# Patient Record
Sex: MALE | Race: WHITE | NOT HISPANIC OR LATINO | Employment: OTHER | URBAN - METROPOLITAN AREA
[De-identification: names, ages, dates, MRNs, and addresses within clinical notes are randomized per-mention and may not be internally consistent; named-entity substitution may affect disease eponyms.]

---

## 2017-06-20 ENCOUNTER — TRANSCRIBE ORDERS (OUTPATIENT)
Dept: ADMINISTRATIVE | Facility: HOSPITAL | Age: 47
End: 2017-06-20

## 2017-06-20 DIAGNOSIS — F43.22 ADJUSTMENT DISORDER WITH ANXIETY: Primary | ICD-10-CM

## 2017-07-01 ENCOUNTER — HOSPITAL ENCOUNTER (OUTPATIENT)
Dept: SLEEP CENTER | Facility: CLINIC | Age: 47
Discharge: HOME | End: 2017-07-01
Payer: OTHER MISCELLANEOUS

## 2017-07-01 DIAGNOSIS — F43.22 ADJUSTMENT DISORDER WITH ANXIETY: ICD-10-CM

## 2017-07-01 PROCEDURE — 95810 POLYSOM 6/> YRS 4/> PARAM: CPT

## 2017-08-05 ENCOUNTER — HOSPITAL ENCOUNTER (OUTPATIENT)
Dept: SLEEP CENTER | Facility: CLINIC | Age: 47
Discharge: HOME | End: 2017-08-05

## 2017-11-30 ENCOUNTER — HOSPITAL ENCOUNTER (OUTPATIENT)
Dept: RADIOLOGY | Facility: HOSPITAL | Age: 47
Discharge: HOME | End: 2017-11-30
Attending: INTERNAL MEDICINE
Payer: OTHER MISCELLANEOUS

## 2017-11-30 ENCOUNTER — GENERIC CONVERSION - ENCOUNTER (OUTPATIENT)
Dept: OTHER | Facility: OTHER | Age: 47
End: 2017-11-30

## 2017-11-30 ENCOUNTER — TRANSCRIBE ORDERS (OUTPATIENT)
Dept: ADMINISTRATIVE | Facility: HOSPITAL | Age: 47
End: 2017-11-30

## 2017-11-30 ENCOUNTER — APPOINTMENT (OUTPATIENT)
Dept: LAB | Facility: HOSPITAL | Age: 47
End: 2017-11-30
Attending: INTERNAL MEDICINE
Payer: OTHER MISCELLANEOUS

## 2017-11-30 DIAGNOSIS — R05.9 COUGH: ICD-10-CM

## 2017-11-30 DIAGNOSIS — R07.9 CHEST PAIN, UNSPECIFIED TYPE: ICD-10-CM

## 2017-11-30 DIAGNOSIS — R10.9 STOMACH ACHE: ICD-10-CM

## 2017-11-30 DIAGNOSIS — R05.9 COUGH: Primary | ICD-10-CM

## 2017-11-30 PROCEDURE — 93005 ELECTROCARDIOGRAM TRACING: CPT

## 2017-11-30 PROCEDURE — 71020 HB CHEST X-RAY 2VW FRONTAL&LATL: CPT

## 2017-12-01 ENCOUNTER — TRANSCRIBE ORDERS (OUTPATIENT)
Dept: ADMINISTRATIVE | Facility: HOSPITAL | Age: 47
End: 2017-12-01

## 2017-12-01 ENCOUNTER — APPOINTMENT (OUTPATIENT)
Dept: LAB | Facility: HOSPITAL | Age: 47
End: 2017-12-01
Payer: OTHER MISCELLANEOUS

## 2017-12-01 DIAGNOSIS — M54.5 LOW BACK PAIN, UNSPECIFIED BACK PAIN LATERALITY, UNSPECIFIED CHRONICITY, WITH SCIATICA PRESENCE UNSPECIFIED: ICD-10-CM

## 2017-12-01 DIAGNOSIS — R05.9 COUGH: ICD-10-CM

## 2017-12-01 DIAGNOSIS — R53.83 FATIGUE, UNSPECIFIED TYPE: ICD-10-CM

## 2017-12-01 DIAGNOSIS — E55.9 VITAMIN D DEFICIENCY DISEASE: ICD-10-CM

## 2017-12-01 DIAGNOSIS — Z79.899 DRUG THERAPY: ICD-10-CM

## 2017-12-01 DIAGNOSIS — R63.4 LOSS OF WEIGHT: ICD-10-CM

## 2017-12-01 DIAGNOSIS — M54.5 LOW BACK PAIN, UNSPECIFIED BACK PAIN LATERALITY, UNSPECIFIED CHRONICITY, WITH SCIATICA PRESENCE UNSPECIFIED: Primary | ICD-10-CM

## 2017-12-01 DIAGNOSIS — Z79.899 NEED FOR PROPHYLACTIC CHEMOTHERAPY: ICD-10-CM

## 2017-12-01 LAB
25(OH)D3 SERPL-MCNC: 5.4 NG/ML (ref 30–100)
ALBUMIN SERPL BCP-MCNC: 3.7 G/DL (ref 3.5–5)
ALP SERPL-CCNC: 95 U/L (ref 46–116)
ALT SERPL W P-5'-P-CCNC: 113 U/L (ref 12–78)
ANION GAP SERPL CALCULATED.3IONS-SCNC: 10 MMOL/L (ref 4–13)
AST SERPL W P-5'-P-CCNC: 168 U/L (ref 5–45)
ATRIAL RATE: 89 BPM
BASOPHILS # BLD AUTO: 0 THOUSANDS/ΜL (ref 0–0.1)
BASOPHILS NFR BLD AUTO: 1 % (ref 0–1)
BILIRUB SERPL-MCNC: 1.3 MG/DL (ref 0.2–1)
BUN SERPL-MCNC: 12 MG/DL (ref 5–25)
CALCIUM SERPL-MCNC: 8.7 MG/DL (ref 8.3–10.1)
CHLORIDE SERPL-SCNC: 103 MMOL/L (ref 100–108)
CHOLEST SERPL-MCNC: 179 MG/DL (ref 50–200)
CO2 SERPL-SCNC: 28 MMOL/L (ref 21–32)
CREAT SERPL-MCNC: 0.79 MG/DL (ref 0.6–1.3)
CRP SERPL HS-MCNC: <0.9 MG/L
EOSINOPHIL # BLD AUTO: 0 THOUSAND/ΜL (ref 0–0.61)
EOSINOPHIL NFR BLD AUTO: 0 % (ref 0–6)
ERYTHROCYTE [DISTWIDTH] IN BLOOD BY AUTOMATED COUNT: 19.5 % (ref 11.6–15.1)
GFR SERPL CREATININE-BSD FRML MDRD: 106 ML/MIN/1.73SQ M
GLUCOSE P FAST SERPL-MCNC: 102 MG/DL (ref 65–99)
HCT VFR BLD AUTO: 30.9 % (ref 42–52)
HDLC SERPL-MCNC: 111 MG/DL (ref 40–60)
HGB BLD-MCNC: 9.9 G/DL (ref 14–18)
IRON SERPL-MCNC: 22 UG/DL (ref 65–175)
LDLC SERPL CALC-MCNC: 49 MG/DL (ref 0–100)
LYMPHOCYTES # BLD AUTO: 1.6 THOUSANDS/ΜL (ref 0.6–4.47)
LYMPHOCYTES NFR BLD AUTO: 44 % (ref 14–44)
MAGNESIUM SERPL-MCNC: 1.7 MG/DL (ref 1.6–2.6)
MCH RBC QN AUTO: 28.4 PG (ref 27–31)
MCHC RBC AUTO-ENTMCNC: 32.2 G/DL (ref 31.4–37.4)
MCV RBC AUTO: 88 FL (ref 82–98)
MONOCYTES # BLD AUTO: 0.4 THOUSAND/ΜL (ref 0.17–1.22)
MONOCYTES NFR BLD AUTO: 9 % (ref 4–12)
NEUTROPHILS # BLD AUTO: 1.7 THOUSANDS/ΜL (ref 1.85–7.62)
NEUTS SEG NFR BLD AUTO: 45 % (ref 43–75)
NRBC BLD AUTO-RTO: 0 /100 WBCS
P AXIS: 76 DEGREES
PLATELET # BLD AUTO: 247 THOUSANDS/UL (ref 130–400)
PMV BLD AUTO: 7.5 FL (ref 8.9–12.7)
POTASSIUM SERPL-SCNC: 3.7 MMOL/L (ref 3.5–5.3)
PR INTERVAL: 142 MS
PROT SERPL-MCNC: 7.6 G/DL (ref 6.4–8.2)
QRS AXIS: 9 DEGREES
QRSD INTERVAL: 88 MS
QT INTERVAL: 370 MS
QTC INTERVAL: 450 MS
RBC # BLD AUTO: 3.5 MILLION/UL (ref 4.7–6.1)
SODIUM SERPL-SCNC: 141 MMOL/L (ref 136–145)
T WAVE AXIS: 63 DEGREES
TRIGL SERPL-MCNC: 95 MG/DL
TSH SERPL DL<=0.05 MIU/L-ACNC: 0.93 UIU/ML (ref 0.36–3.74)
URATE SERPL-MCNC: 6.5 MG/DL (ref 4.2–8)
VENTRICULAR RATE: 89 BPM
VIT B12 SERPL-MCNC: 445 PG/ML (ref 100–900)
WBC # BLD AUTO: 3.7 THOUSAND/UL (ref 4.8–10.8)

## 2017-12-01 PROCEDURE — 83540 ASSAY OF IRON: CPT

## 2017-12-01 PROCEDURE — 82306 VITAMIN D 25 HYDROXY: CPT

## 2017-12-01 PROCEDURE — 86141 C-REACTIVE PROTEIN HS: CPT

## 2017-12-01 PROCEDURE — 84550 ASSAY OF BLOOD/URIC ACID: CPT

## 2017-12-01 PROCEDURE — 84443 ASSAY THYROID STIM HORMONE: CPT

## 2017-12-01 PROCEDURE — 80061 LIPID PANEL: CPT

## 2017-12-01 PROCEDURE — 85025 COMPLETE CBC W/AUTO DIFF WBC: CPT

## 2017-12-01 PROCEDURE — 82607 VITAMIN B-12: CPT

## 2017-12-01 PROCEDURE — 80053 COMPREHEN METABOLIC PANEL: CPT

## 2017-12-01 PROCEDURE — 36415 COLL VENOUS BLD VENIPUNCTURE: CPT

## 2017-12-01 PROCEDURE — 83735 ASSAY OF MAGNESIUM: CPT

## 2018-03-31 ENCOUNTER — HOSPITAL ENCOUNTER (INPATIENT)
Facility: HOSPITAL | Age: 48
LOS: 6 days | Discharge: HOME | DRG: 439 | End: 2018-04-06
Attending: EMERGENCY MEDICINE | Admitting: FAMILY MEDICINE
Payer: OTHER MISCELLANEOUS

## 2018-03-31 ENCOUNTER — APPOINTMENT (EMERGENCY)
Dept: RADIOLOGY | Facility: HOSPITAL | Age: 48
DRG: 439 | End: 2018-03-31

## 2018-03-31 DIAGNOSIS — M54.81 OCCIPITAL NEURALGIA: ICD-10-CM

## 2018-03-31 DIAGNOSIS — K85.90 PANCREATITIS: Primary | ICD-10-CM

## 2018-03-31 DIAGNOSIS — E83.42 HYPOMAGNESEMIA: ICD-10-CM

## 2018-03-31 PROBLEM — E87.2 HIGH ANION GAP METABOLIC ACIDOSIS: Status: ACTIVE | Noted: 2018-03-31

## 2018-03-31 PROBLEM — F10.10 ALCOHOL ABUSE: Status: ACTIVE | Noted: 2018-03-31

## 2018-03-31 PROBLEM — I10 HYPERTENSION: Status: ACTIVE | Noted: 2018-03-31

## 2018-03-31 PROBLEM — E87.1 HYPONATREMIA: Status: ACTIVE | Noted: 2018-03-31

## 2018-03-31 PROBLEM — E87.29 HIGH ANION GAP METABOLIC ACIDOSIS: Status: ACTIVE | Noted: 2018-03-31

## 2018-03-31 PROBLEM — R79.89 ABNORMAL LFTS: Status: ACTIVE | Noted: 2018-03-31

## 2018-03-31 LAB
ALBUMIN SERPL BCP-MCNC: 2.8 G/DL (ref 3.5–5)
ALP SERPL-CCNC: 334 U/L (ref 46–116)
ALT SERPL W P-5'-P-CCNC: 207 U/L (ref 12–78)
ANION GAP SERPL CALCULATED.3IONS-SCNC: 25 MMOL/L (ref 4–13)
AST SERPL W P-5'-P-CCNC: 472 U/L (ref 5–45)
BILIRUB SERPL-MCNC: 2.1 MG/DL (ref 0.2–1)
BUN SERPL-MCNC: 13 MG/DL (ref 5–25)
CALCIUM SERPL-MCNC: 8.2 MG/DL (ref 8.3–10.1)
CHLORIDE SERPL-SCNC: 90 MMOL/L (ref 100–108)
CO2 SERPL-SCNC: 12 MMOL/L (ref 21–32)
CREAT SERPL-MCNC: 0.86 MG/DL (ref 0.6–1.3)
ERYTHROCYTE [DISTWIDTH] IN BLOOD BY AUTOMATED COUNT: 21.3 % (ref 11.6–15.1)
GFR SERPL CREATININE-BSD FRML MDRD: 102 ML/MIN/1.73SQ M
GLUCOSE SERPL-MCNC: 185 MG/DL (ref 65–140)
HCT VFR BLD AUTO: 41.2 % (ref 42–52)
HGB BLD-MCNC: 13.7 G/DL (ref 14–18)
LIPASE SERPL-CCNC: 1231 U/L (ref 73–393)
LYMPHOCYTES # BLD AUTO: 1.49 THOUSAND/UL (ref 0.6–4.47)
LYMPHOCYTES # BLD AUTO: 27 %
MCH RBC QN AUTO: 30.6 PG (ref 27–31)
MCHC RBC AUTO-ENTMCNC: 33.3 G/DL (ref 31.4–37.4)
MCV RBC AUTO: 92 FL (ref 82–98)
MONOCYTES # BLD AUTO: 0.33 THOUSAND/UL (ref 0–1.22)
MONOCYTES NFR BLD AUTO: 6 % (ref 4–12)
NEUTS BAND NFR BLD MANUAL: 3 % (ref 0–8)
NEUTS SEG # BLD: 3.69 THOUSAND/UL (ref 1.81–6.82)
NEUTS SEG NFR BLD AUTO: 64 %
NRBC BLD AUTO-RTO: 0 /100 WBCS
OSMOLALITY UR/SERPL-RTO: 283 MMOL/KG (ref 282–298)
PLATELET # BLD AUTO: 135 THOUSANDS/UL (ref 130–400)
PLATELET BLD QL SMEAR: ABNORMAL
PMV BLD AUTO: 8.8 FL (ref 8.9–12.7)
POTASSIUM SERPL-SCNC: 4.7 MMOL/L (ref 3.5–5.3)
PROT SERPL-MCNC: 6.9 G/DL (ref 6.4–8.2)
RBC # BLD AUTO: 4.49 MILLION/UL (ref 4.7–6.1)
SODIUM SERPL-SCNC: 127 MMOL/L (ref 136–145)
SODIUM SERPL-SCNC: 127 MMOL/L (ref 136–145)
TOTAL CELLS COUNTED SPEC: 100
WBC # BLD AUTO: 5.5 THOUSAND/UL (ref 4.8–10.8)

## 2018-03-31 PROCEDURE — 84295 ASSAY OF SERUM SODIUM: CPT | Performed by: FAMILY MEDICINE

## 2018-03-31 PROCEDURE — 85027 COMPLETE CBC AUTOMATED: CPT | Performed by: EMERGENCY MEDICINE

## 2018-03-31 PROCEDURE — 96375 TX/PRO/DX INJ NEW DRUG ADDON: CPT

## 2018-03-31 PROCEDURE — 96361 HYDRATE IV INFUSION ADD-ON: CPT

## 2018-03-31 PROCEDURE — 87081 CULTURE SCREEN ONLY: CPT | Performed by: FAMILY MEDICINE

## 2018-03-31 PROCEDURE — 96374 THER/PROPH/DIAG INJ IV PUSH: CPT

## 2018-03-31 PROCEDURE — 80053 COMPREHEN METABOLIC PANEL: CPT | Performed by: EMERGENCY MEDICINE

## 2018-03-31 PROCEDURE — 80048 BASIC METABOLIC PNL TOTAL CA: CPT | Performed by: FAMILY MEDICINE

## 2018-03-31 PROCEDURE — 74177 CT ABD & PELVIS W/CONTRAST: CPT

## 2018-03-31 PROCEDURE — 83930 ASSAY OF BLOOD OSMOLALITY: CPT | Performed by: FAMILY MEDICINE

## 2018-03-31 PROCEDURE — 36415 COLL VENOUS BLD VENIPUNCTURE: CPT | Performed by: EMERGENCY MEDICINE

## 2018-03-31 PROCEDURE — 83690 ASSAY OF LIPASE: CPT | Performed by: EMERGENCY MEDICINE

## 2018-03-31 PROCEDURE — 99285 EMERGENCY DEPT VISIT HI MDM: CPT

## 2018-03-31 PROCEDURE — 85007 BL SMEAR W/DIFF WBC COUNT: CPT | Performed by: EMERGENCY MEDICINE

## 2018-03-31 PROCEDURE — 99223 1ST HOSP IP/OBS HIGH 75: CPT | Performed by: FAMILY MEDICINE

## 2018-03-31 PROCEDURE — 80329 ANALGESICS NON-OPIOID 1 OR 2: CPT | Performed by: FAMILY MEDICINE

## 2018-03-31 RX ORDER — DIPHENHYDRAMINE HYDROCHLORIDE 50 MG/ML
25 INJECTION INTRAMUSCULAR; INTRAVENOUS EVERY 6 HOURS PRN
Status: DISCONTINUED | OUTPATIENT
Start: 2018-03-31 | End: 2018-03-31

## 2018-03-31 RX ORDER — ONDANSETRON 2 MG/ML
4 INJECTION INTRAMUSCULAR; INTRAVENOUS EVERY 6 HOURS PRN
Status: DISCONTINUED | OUTPATIENT
Start: 2018-03-31 | End: 2018-04-06 | Stop reason: HOSPADM

## 2018-03-31 RX ORDER — MORPHINE SULFATE 4 MG/ML
4 INJECTION, SOLUTION INTRAMUSCULAR; INTRAVENOUS EVERY 4 HOURS PRN
Status: DISCONTINUED | OUTPATIENT
Start: 2018-03-31 | End: 2018-04-06 | Stop reason: HOSPADM

## 2018-03-31 RX ORDER — METOCLOPRAMIDE HYDROCHLORIDE 5 MG/ML
10 INJECTION INTRAMUSCULAR; INTRAVENOUS ONCE
Status: COMPLETED | OUTPATIENT
Start: 2018-03-31 | End: 2018-03-31

## 2018-03-31 RX ORDER — TRAMADOL HYDROCHLORIDE 50 MG/1
50 TABLET ORAL EVERY 6 HOURS PRN
COMMUNITY
End: 2018-10-16

## 2018-03-31 RX ORDER — CHOLECALCIFEROL (VITAMIN D3) 125 MCG
1000 CAPSULE ORAL DAILY
Status: DISCONTINUED | OUTPATIENT
Start: 2018-03-31 | End: 2018-04-06 | Stop reason: HOSPADM

## 2018-03-31 RX ORDER — FOLIC ACID 1 MG/1
1 TABLET ORAL DAILY
Status: DISCONTINUED | OUTPATIENT
Start: 2018-03-31 | End: 2018-04-03 | Stop reason: SDUPTHER

## 2018-03-31 RX ORDER — ASCORBIC ACID 500 MG
1500 TABLET ORAL DAILY
Status: DISCONTINUED | OUTPATIENT
Start: 2018-03-31 | End: 2018-04-06 | Stop reason: HOSPADM

## 2018-03-31 RX ORDER — TRAMADOL HYDROCHLORIDE 50 MG/1
50 TABLET ORAL EVERY 6 HOURS PRN
Status: DISCONTINUED | OUTPATIENT
Start: 2018-03-31 | End: 2018-04-06 | Stop reason: HOSPADM

## 2018-03-31 RX ORDER — IBUPROFEN 600 MG/1
600 TABLET ORAL ONCE
Status: COMPLETED | OUTPATIENT
Start: 2018-03-31 | End: 2018-03-31

## 2018-03-31 RX ORDER — MULTIVIT WITH MINERALS/LUTEIN
2000 TABLET ORAL DAILY
COMMUNITY

## 2018-03-31 RX ORDER — FERROUS SULFATE 325(65) MG
325 TABLET ORAL
Status: DISCONTINUED | OUTPATIENT
Start: 2018-04-01 | End: 2018-04-06 | Stop reason: HOSPADM

## 2018-03-31 RX ORDER — KETOROLAC TROMETHAMINE 30 MG/ML
15 INJECTION, SOLUTION INTRAMUSCULAR; INTRAVENOUS ONCE
Status: COMPLETED | OUTPATIENT
Start: 2018-03-31 | End: 2018-03-31

## 2018-03-31 RX ORDER — SODIUM CHLORIDE 9 MG/ML
150 INJECTION, SOLUTION INTRAVENOUS CONTINUOUS
Status: DISCONTINUED | OUTPATIENT
Start: 2018-03-31 | End: 2018-04-03

## 2018-03-31 RX ORDER — MORPHINE SULFATE 4 MG/ML
4 INJECTION, SOLUTION INTRAMUSCULAR; INTRAVENOUS ONCE
Status: COMPLETED | OUTPATIENT
Start: 2018-03-31 | End: 2018-03-31

## 2018-03-31 RX ADMIN — METOCLOPRAMIDE 10 MG: 5 INJECTION, SOLUTION INTRAMUSCULAR; INTRAVENOUS at 09:17

## 2018-03-31 RX ADMIN — ENOXAPARIN SODIUM 40 MG: 40 INJECTION SUBCUTANEOUS at 14:13

## 2018-03-31 RX ADMIN — OXYCODONE HYDROCHLORIDE AND ACETAMINOPHEN 1500 MG: 500 TABLET ORAL at 14:12

## 2018-03-31 RX ADMIN — SODIUM CHLORIDE 1000 ML: 0.9 INJECTION, SOLUTION INTRAVENOUS at 10:08

## 2018-03-31 RX ADMIN — MORPHINE SULFATE 4 MG: 4 INJECTION INTRAVENOUS at 21:15

## 2018-03-31 RX ADMIN — Medication 100 MG: at 23:32

## 2018-03-31 RX ADMIN — IBUPROFEN 600 MG: 600 TABLET, FILM COATED ORAL at 18:04

## 2018-03-31 RX ADMIN — FAMOTIDINE 20 MG: 10 INJECTION, SOLUTION INTRAVENOUS at 21:16

## 2018-03-31 RX ADMIN — IOHEXOL 100 ML: 350 INJECTION, SOLUTION INTRAVENOUS at 10:29

## 2018-03-31 RX ADMIN — MORPHINE SULFATE 4 MG: 4 INJECTION INTRAVENOUS at 14:14

## 2018-03-31 RX ADMIN — MORPHINE SULFATE 4 MG: 4 INJECTION INTRAVENOUS at 10:11

## 2018-03-31 RX ADMIN — SODIUM CHLORIDE 150 ML/HR: 0.9 INJECTION, SOLUTION INTRAVENOUS at 11:34

## 2018-03-31 RX ADMIN — SODIUM CHLORIDE 1000 ML: 0.9 INJECTION, SOLUTION INTRAVENOUS at 09:11

## 2018-03-31 RX ADMIN — SODIUM CHLORIDE 100 ML/HR: 0.9 INJECTION, SOLUTION INTRAVENOUS at 18:02

## 2018-03-31 RX ADMIN — CYANOCOBALAMIN TAB 500 MCG 1000 MCG: 500 TAB at 14:13

## 2018-03-31 RX ADMIN — KETOROLAC TROMETHAMINE 15 MG: 30 INJECTION, SOLUTION INTRAMUSCULAR at 09:17

## 2018-03-31 NOTE — ASSESSMENT & PLAN NOTE
Patient noted to have elevated LFTs on lab work from December 2017 but appear worse during this admission  Likely due to alcohol abuse  Right upper quadrant ultrasound showed hepatomegaly with fatty change without any ductal dilatation  Tylenol, salicylate level low  hepatitis panel nonreactive  LFTs are improving  Follow up repeat CMP in a m   CT abdomen/pelvis showed hepatomegaly with prominent fatty infiltration and wedge-shaped hypoattenuation along the gallbladder

## 2018-03-31 NOTE — ASSESSMENT & PLAN NOTE
Noted on CT abdomen/pelvis  Likely alcoholic hepatitis  Abdominal ultrasound showed hepatomegaly with fatty liver without any stones or biliary ductal dilatation  Patient's triglyceride levels were normal  Lipase level has increased to 436 from 397 today   Continue IV fluids which were changed to LR at 200 milliliters per hr,  IV morphine, oxycodone IR as needed for pain control  Patient does admit to drinking 2-3 shots of cognac every day for the last 3-4 months  Diet will be advanced to low-fat diet and monitor course with repeat LFTs in [place] Patient also had a low-grade fever last night

## 2018-03-31 NOTE — ASSESSMENT & PLAN NOTE
Patient has been taking 2-3 shots of cognac every day for the last 2-4 months to help control his pain from occipital neurologia  Patient will be started on alcohol withdrawal protocol with Ativan p r n , Librium 25 milligram p o  q 8 hours, thiamine 100 milligram p o  daily, folic acid 1 milligram p o  daily

## 2018-03-31 NOTE — ASSESSMENT & PLAN NOTE
Likely due to dehydration  patient does report decreased p o   Intake  Resolved with IV fluids   Repeat lab work tomorrow

## 2018-03-31 NOTE — ED PROVIDER NOTES
History  Chief Complaint   Patient presents with    Headache     headache ongoing x 1 month,has neurostimulator implant x 2 1/2 yrs for occipital neuralgia,vomiting and losing weight,can't see neurologist till April 17     Patient presents for evaluation of headache  States history of trigeminal neuralgia  Left sided radiating straight to the from  Has nerve stimulator  States waiting to follow up with the device technician but having trouble because of insurance/workmen's compensation issue  States pain has been worse recently  Nauseas and dry heaving but not vomiting as he doesn't have a stomach secondary to cancer  Unable to take anything by mouth for a few days  States Tramadol prescribed is not helping  History provided by:  Patient   used: No    Headache   Associated symptoms: nausea        Prior to Admission Medications   Prescriptions Last Dose Informant Patient Reported? Taking? Ascorbic Acid (VITAMIN C) 1000 MG tablet   Yes Yes   Sig: Take 2,000 mg by mouth daily   Cyanocobalamin (VITAMIN B 12 PO)   Yes Yes   Sig: Take by mouth   ferrous sulfate 220 (44 Fe) mg/5 mL solution   Yes Yes   Sig: Take 220 mg by mouth daily   traMADol (ULTRAM) 50 mg tablet   Yes Yes   Sig: Take 50 mg by mouth every 6 (six) hours as needed for moderate pain      Facility-Administered Medications: None       Past Medical History:   Diagnosis Date    Anemia     Hypertension     Insomnia     Occipital neuralgia     Other mechanical complication of implanted electronic neurostimulator of spinal cord electrode (lead), sequela     maybe not working    Vitamin D deficiency        Past Surgical History:   Procedure Laterality Date    BACK SURGERY      cervical discectomy       History reviewed  No pertinent family history  I have reviewed and agree with the history as documented      Social History   Substance Use Topics    Smoking status: Never Smoker    Smokeless tobacco: Never Used    Alcohol use Yes      Comment: cognac daily        Review of Systems   Gastrointestinal: Positive for nausea  Neurological: Positive for headaches  All other systems reviewed and are negative  Physical Exam  ED Triage Vitals [03/31/18 0857]   Temperature Pulse Respirations Blood Pressure SpO2   98 2 °F (36 8 °C) (!) 128 18 (!) 178/110 97 %      Temp Source Heart Rate Source Patient Position - Orthostatic VS BP Location FiO2 (%)   Tympanic -- Lying Left arm --      Pain Score       Worst Possible Pain           Orthostatic Vital Signs  Vitals:    03/31/18 0857   BP: (!) 178/110   Pulse: (!) 128   Patient Position - Orthostatic VS: Lying       Physical Exam   Constitutional: He is oriented to person, place, and time  No distress  HENT:   Mouth/Throat: Oropharynx is clear and moist    Eyes: EOM are normal  Pupils are equal, round, and reactive to light  Neck: Normal range of motion  Cardiovascular: Regular rhythm and intact distal pulses  Tachycardia present  Pulmonary/Chest: Effort normal and breath sounds normal  No respiratory distress  Abdominal: Soft  There is no tenderness  Musculoskeletal: Normal range of motion  Neurological: He is alert and oriented to person, place, and time  No cranial nerve deficit or sensory deficit  He exhibits normal muscle tone  Coordination normal    Skin: Capillary refill takes less than 2 seconds  He is not diaphoretic  Nursing note and vitals reviewed  ED Medications  Medications   sodium chloride 0 9 % bolus 1,000 mL (1,000 mL Intravenous New Bag 3/31/18 0911)   diphenhydrAMINE (BENADRYL) injection 25 mg (not administered)   metoclopramide (REGLAN) injection 10 mg (10 mg Intravenous Given 3/31/18 0917)   ketorolac (TORADOL) injection 15 mg (15 mg Intravenous Given 3/31/18 0917)       Diagnostic Studies  Results Reviewed     Procedure Component Value Units Date/Time    Comprehensive metabolic panel [[id number]] Collected:  03/31/18 0903    Lab Status:   In process Specimen:  Blood from Arm, Left Updated:  03/31/18 0914    Lipase [[id number]] Collected:  03/31/18 8061    Lab Status: In process Specimen:  Blood from Arm, Left Updated:  03/31/18 0914    CBC and differential [[id number]] Collected:  03/31/18 0903    Lab Status: In process Specimen:  Blood from Arm, Left Updated:  03/31/18 0914                 No orders to display              Procedures  Procedures       Phone Contacts  ED Phone Contact    ED Course  ED Course                                MDM  Number of Diagnoses or Management Options  Pancreatitis:   Diagnosis management comments: Pulse ox 97% on RA indicating adequate oxygenation      CT results discussed with patient and wife  Will admit for further evaluation and treatment  Amount and/or Complexity of Data Reviewed  Clinical lab tests: ordered and reviewed  Discuss the patient with other providers: yes    Patient Progress  Patient progress: stable    CritCare Time    Disposition  Final diagnoses:   None     ED Disposition     None      Follow-up Information    None       Patient's Medications   Discharge Prescriptions    No medications on file     No discharge procedures on file      ED Provider  Electronically Signed by           Adams Barbosa DO  03/31/18 4562

## 2018-03-31 NOTE — ASSESSMENT & PLAN NOTE
Patient does report high blood pressure previously but not on any medications as it was attributed to pain from his occipital neuralgia  Blood pressures seem like they are improving    Monitor blood pressures off of antihypertensives for now

## 2018-03-31 NOTE — H&P
History and Physical - [facility] Internal Medicine    Patient Information: Susan Polo 51 y o  male MRN: [id number]  Unit/Bed#: [facility] 315-02 Encounter: [id number]  Admitting Physician: Boyd Rosas DO  PCP: Shania Luna MD  Date of Admission:  03/31/18        Hospital Problem List:     Principal Problem:    Acute pancreatitis without infection or necrosis  Active Problems:    Abnormal LFTs    Essential hypertension    Hyponatremia    High anion gap metabolic acidosis    Occipital neuralgia    Alcohol abuse      Assessment/Plan:    * Acute pancreatitis without infection or necrosis   Assessment & Plan    Noted on CT abdomen/pelvis  Lipase level also elevated  Admit patient for further management  Keep patient NPO  Place patient on IV fluids  IV morphine as needed for pain control  Consult GI for further recommendation  Repeat lipase level in the a m  Patient does admit to drinking 2-3 shots of cognac every day for the last 3-4 months  Once abdominal pain and lipase level starts to improve, will slowly initiate him on a diet        Abnormal LFTs   Assessment & Plan    Patient noted to have elevated LFTs on lab work from December 2017 but appear worse today  Will check Tylenol, salicylate level, hepatitis panel  Repeat LFTs in the a m   CT abdomen/pelvis showed hepatomegaly with prominent fatty infiltration and wedge-shaped hypoattenuation along the gallbladder  GI consult as noted above        High anion gap metabolic acidosis   Assessment & Plan    Likely due to dehydration  patient does report decreased p o   Intake  IV fluids and repeat lab work later today to make sure the anion gap is improving        Hyponatremia   Assessment & Plan    Likely hypovolemic hyponatremia  IV fluids and repeat lab work later to avoid over correction        Essential hypertension   Assessment & Plan    Patient does report high blood pressure previously but not on any medications as it was attributed to his occipital neuralgia  Monitor blood pressures overnight  If remains persistently elevated, will start patient on antihypertensive        Alcohol abuse   Assessment & Plan    Patient has been taking 2-3 shots of cognac every day for the last 2-4 months to help control his pain from occipital neurology  Patient advised to stop drinking alcohol  Start patient on multivitamin, thiamine, folic acid        Occipital neuralgia   Assessment & Plan    Chronic in nature for patient but has worsened over the last 3 months  Advised patient to follow up with his neurologist and pain management doctor after discharge                VTE Prophylaxis: Enoxaparin (Lovenox)  / sequential compression device   Code Status: Level 1 - Full Code    Anticipated Length of Stay:  Patient will be admitted on an Inpatient basis with an anticipated length of stay of atleast 2 midnights  Justification for Hospital Stay:  Acute pancreatitis    Total Time for Visit, including Counseling / Coordination of Care: 45 minutes  Greater than 50% of this total time spent on direct patient counseling and coordination of care  Chief Complaint:     Headache (headache ongoing x 1 month,has neurostimulator implant x 2 1/2 yrs for occipital neuralgia,vomiting and losing weight,can't see neurologist till April 17)    of Present Illness:    Pete Veliz is a 51 y o  male who presents with complaints of headache x 3 months  Patient has diagnosis of occipital neuralgia and has a neurostimulator in place  The neurostimulator was placed about 2 and half years ago and patient was doing well in terms of headache  The pain started again about 3 months ago and patient has been having nausea, dry heaving and decreased appetite  patient states that due to the dry heaving he has been having generalized abdominal pain over the last 3 months however over the last 2-3 days it has worsened  He states that the pain is worse in the right upper quadrant    He was prescribed tramadol by his pain management doctor with no relief  Patient states that the abdominal pain worsens when he takes deep breaths or if he is standing up and walking  He reports lightheadedness  Patient states that headache is in his occipital region on the left side and he has numbness in the 4th and 5th digit of his left hand and also reports weakness of his left hand due to it   patient does admit to drinking 2-3 shots of cognac every day for the last 3-4 months  Patient states that the abdominal pain was 7/10 in intensity but when seen by me he stated that the pain was 5/10 in intensity  Patient does have history of high blood pressure but not on any medications at home  In The ER workup revealed findings suggestive of acute pancreatitis    Review of Systems:    Review of Systems   Constitutional: Positive for appetite change  Negative for chills and fever  HENT: Negative for congestion, sore throat and trouble swallowing  Eyes: Negative for photophobia and visual disturbance  Respiratory: Negative for cough, chest tightness and shortness of breath  Cardiovascular: Negative for chest pain and leg swelling  Gastrointestinal: Positive for abdominal pain and nausea  Negative for blood in stool, constipation, diarrhea and vomiting  Genitourinary: Negative for dysuria, frequency and hematuria  Musculoskeletal: Negative for joint swelling  Skin: Negative for wound  Neurological: Positive for weakness (land hand- chronic), light-headedness and headaches  Negative for syncope  Hematological: Does not bruise/bleed easily  Psychiatric/Behavioral: Negative for agitation         Past Medical and Surgical History:     Past Medical History:   Diagnosis Date    Anemia     Hypertension     Insomnia     Occipital neuralgia     Other mechanical complication of implanted electronic neurostimulator of spinal cord electrode (lead), sequela     maybe not working   [facility]     Vitamin D deficiency        Past Surgical History:   Procedure Laterality Date    BACK SURGERY      cervical discectomy       Meds/Allergies:    PTA meds:   Prior to Admission Medications   Prescriptions Last Dose Informant Patient Reported? Taking? Ascorbic Acid (VITAMIN C) 1000 MG tablet   Yes Yes   Sig: Take 2,000 mg by mouth daily   Cyanocobalamin (VITAMIN B 12 PO)   Yes Yes   Sig: Take by mouth   ferrous sulfate 220 (44 Fe) mg/5 mL solution   Yes Yes   Sig: Take 220 mg by mouth daily   traMADol (ULTRAM) 50 mg tablet   Yes Yes   Sig: Take 50 mg by mouth every 6 (six) hours as needed for moderate pain      Facility-Administered Medications: None       Allergies: No Known Allergies  History:     Marital Status: /Civil Union     Substance Use History:   History   Alcohol Use    Yes     Comment: cognac daily     History   Smoking Status    Never Smoker   Smokeless Tobacco    Never Used     History   Drug Use No       Family History:    History reviewed  No pertinent family history  Physical Exam:     Vitals:   Blood Pressure: 162/87 (03/31/18 1619)  Pulse: 93 (03/31/18 1619)  Temperature: 100 4 °F (38 °C) (03/31/18 1619)  Temp Source: Tympanic (03/31/18 1619)  Respirations: 20 (03/31/18 1619)  Height: 6' (182 9 cm) (03/31/18 1216)  Weight - Scale: 76 1 kg (167 lb 11 2 oz) (03/31/18 1216)  SpO2: 96 % (03/31/18 1619)    Physical Exam   Constitutional: He is oriented to person, place, and time  He appears well-developed and well-nourished  No distress  HENT:   Head: Normocephalic and atraumatic  Dry mucous membranes   Eyes: EOM are normal  Right eye exhibits no discharge  Left eye exhibits no discharge  No scleral icterus  Neck: Neck supple  Cardiovascular: Normal rate and regular rhythm  Pulmonary/Chest: Effort normal and breath sounds normal  No respiratory distress  He has no wheezes  He has no rales  Abdominal: Soft  Bowel sounds are normal  He exhibits no distension   There is tenderness (generalized but mostly in RUQ, epigastric)  Musculoskeletal: He exhibits no edema  Neurological: He is alert and oriented to person, place, and time  No cranial nerve deficit  Weakness noted of left hand compared to right - chronic as per patient   Skin: He is not diaphoretic  Psychiatric: He has a normal mood and affect  Lab Results: I have personally reviewed pertinent reports  Results from last 7 days  Lab Units 03/31/18  0903   WBC Thousand/uL 5 50   HEMOGLOBIN g/dL 13 7*   HEMATOCRIT % 41 2*   PLATELETS Thousands/uL 135   LYMPHO PCT % 27   MONO PCT MAN % 6       Results from last 7 days  Lab Units 03/31/18  1801 03/31/18  0903   SODIUM mmol/L 127* 127*   POTASSIUM mmol/L  --  4 7   CHLORIDE mmol/L  --  90*   CO2 mmol/L  --  12*   BUN mg/dL  --  13   CREATININE mg/dL  --  0 86   CALCIUM mg/dL  --  8 2*   TOTAL PROTEIN g/dL  --  6 9   BILIRUBIN TOTAL mg/dL  --  2 10*   ALK PHOS U/L  --  334*   ALT U/L  --  207*   AST U/L  --  472*   GLUCOSE RANDOM mg/dL  --  185*           Imaging: I have personally reviewed pertinent reports  Ct Abdomen Pelvis With Contrast    Result Date: 3/31/2018  Narrative: CT ABDOMEN AND PELVIS WITH IV CONTRAST INDICATION:   pancreatitis/abdominal pain  COMPARISON: 7/8/2011 TECHNIQUE:  CT examination of the abdomen and pelvis was performed  Axial, sagittal, and coronal 2D reformatted images were created from the source data and submitted for interpretation  Radiation dose length product (DLP) for this visit:  500 28 mGy-cm   This examination, like all CT scans performed in the [facility], was performed utilizing techniques to minimize radiation dose exposure, including the use of iterative  reconstruction and automated exposure control  IV Contrast:  100 mL of iohexol (OMNIPAQUE) Enteric Contrast:  Enteric contrast was not administered   FINDINGS: ABDOMEN LOWER CHEST:  No clinically significant abnormality identified in the visualized lower chest  LIVER/BILIARY TREE:  Enlarged fatty liver  Additional wedge-shaped hypodensities right hepatic lobe of uncertain etiology with infarctions difficult to exclude  GALLBLADDER:  No calcified gallstones  No pericholecystic inflammatory change  SPLEEN:  Unremarkable  PANCREAS:  Pancreatic head associated inflammatory change and mild free fluid consistent with pancreatitis without cyst, pseudocyst or abscess  ADRENAL GLANDS:  Unremarkable  KIDNEYS/URETERS:  Right lower pole 6 mm nonobstructive calculus  STOMACH AND BOWEL:  Mild to moderate sliding-type hiatal hernia noted  Inflammatory change noted at the hepatic flexure and ascending colon likely reflecting free fluid tracking inferiorly from the pancreas  Duodenal wall thickening and inflammatory change also noted  APPENDIX:  No findings to suggest appendicitis  ABDOMINOPELVIC CAVITY:  No ascites or free intraperitoneal air  No lymphadenopathy  VESSELS:  Unremarkable for patient's age  PELVIS REPRODUCTIVE ORGANS:  Unremarkable for patient's age  URINARY BLADDER:  Unremarkable  ABDOMINAL WALL/INGUINAL REGIONS:  Unremarkable  OSSEOUS STRUCTURES:  No acute fracture or destructive osseous lesion  Impression: 1  Pancreatic head inflammatory change and mild free fluid consistent with acute pancreatitis without complication  Associated inflammatory change involving the hepatic flexure and ascending colon likely reflects dependent retroperitoneal fluid rather than intrinsic colonic disease  Additionally, duodenal wall thickening and inflammatory change is likely reactive  2   Hepatomegaly with prominent fatty infiltration and additional regions of wedge-shaped hypoattenuation along the gallbladder  Findings could reflect more focal fat (favored) with wedge-shaped infarction difficult to entirely exclude  Vascular structures appear patent  Further evaluation with MRI suggested for further characterization  3   Hiatal hernia   4   Right lower renal pole 6 mm nonobstructive calculus  Workstation performed: [id number]       CT abdomen pelvis with contrast   Final Result   1  Pancreatic head inflammatory change and mild free fluid consistent with acute pancreatitis without complication  Associated inflammatory change involving the hepatic flexure and ascending colon likely reflects dependent retroperitoneal fluid rather    than intrinsic colonic disease  Additionally, duodenal wall thickening and inflammatory change is likely reactive  2   Hepatomegaly with prominent fatty infiltration and additional regions of wedge-shaped hypoattenuation along the gallbladder  Findings could reflect more focal fat (favored) with wedge-shaped infarction difficult to entirely exclude  Vascular    structures appear patent  Further evaluation with MRI suggested for further characterization  3   Hiatal hernia  4   Right lower renal pole 6 mm nonobstructive calculus  Workstation performed: [id number]             EKG, Pathology, and Other Studies Reviewed on Admission:   · No EKG done    epic Records Reviewed: Yes     ** Please Note: Dragon 360 Dictation voice to text software may have been used in the creation of this document   **

## 2018-04-01 PROBLEM — E83.42 HYPOMAGNESEMIA: Status: ACTIVE | Noted: 2018-04-01

## 2018-04-01 LAB
ALBUMIN SERPL BCP-MCNC: 2.3 G/DL (ref 3.5–5)
ALP SERPL-CCNC: 266 U/L (ref 46–116)
ALT SERPL W P-5'-P-CCNC: 117 U/L (ref 12–78)
ANION GAP SERPL CALCULATED.3IONS-SCNC: 14 MMOL/L (ref 4–13)
ANION GAP SERPL CALCULATED.3IONS-SCNC: 6 MMOL/L (ref 4–13)
APAP SERPL-MCNC: <2 UG/ML (ref 10–30)
AST SERPL W P-5'-P-CCNC: 256 U/L (ref 5–45)
BILIRUB SERPL-MCNC: 3.5 MG/DL (ref 0.2–1)
BUN SERPL-MCNC: 12 MG/DL (ref 5–25)
BUN SERPL-MCNC: 5 MG/DL (ref 5–25)
CALCIUM SERPL-MCNC: 7 MG/DL (ref 8.3–10.1)
CALCIUM SERPL-MCNC: 7.1 MG/DL (ref 8.3–10.1)
CHLORIDE SERPL-SCNC: 98 MMOL/L (ref 100–108)
CHLORIDE SERPL-SCNC: 99 MMOL/L (ref 100–108)
CO2 SERPL-SCNC: 20 MMOL/L (ref 21–32)
CO2 SERPL-SCNC: 28 MMOL/L (ref 21–32)
CREAT SERPL-MCNC: 0.81 MG/DL (ref 0.6–1.3)
CREAT SERPL-MCNC: 0.93 MG/DL (ref 0.6–1.3)
ERYTHROCYTE [DISTWIDTH] IN BLOOD BY AUTOMATED COUNT: 20.2 % (ref 11.6–15.1)
GFR SERPL CREATININE-BSD FRML MDRD: 104 ML/MIN/1.73SQ M
GFR SERPL CREATININE-BSD FRML MDRD: 96 ML/MIN/1.73SQ M
GLUCOSE SERPL-MCNC: 156 MG/DL (ref 65–140)
GLUCOSE SERPL-MCNC: 180 MG/DL (ref 65–140)
HCT VFR BLD AUTO: 33.6 % (ref 42–52)
HGB BLD-MCNC: 10.9 G/DL (ref 14–18)
LIPASE SERPL-CCNC: 524 U/L (ref 73–393)
MAGNESIUM SERPL-MCNC: 1.2 MG/DL (ref 1.6–2.6)
MCH RBC QN AUTO: 30.8 PG (ref 27–31)
MCHC RBC AUTO-ENTMCNC: 32.6 G/DL (ref 31.4–37.4)
MCV RBC AUTO: 94 FL (ref 82–98)
MRSA NOSE QL CULT: NORMAL
OSMOLALITY UR: 1009 MMOL/KG
PLATELET # BLD AUTO: 71 THOUSANDS/UL (ref 130–400)
PLATELET BLD QL SMEAR: ABNORMAL
PMV BLD AUTO: 8.5 FL (ref 8.9–12.7)
POTASSIUM SERPL-SCNC: 3.7 MMOL/L (ref 3.5–5.3)
POTASSIUM SERPL-SCNC: 4.1 MMOL/L (ref 3.5–5.3)
PROT SERPL-MCNC: 5.8 G/DL (ref 6.4–8.2)
RBC # BLD AUTO: 3.56 MILLION/UL (ref 4.7–6.1)
SALICYLATES SERPL-MCNC: <3 MG/DL (ref 3–20)
SODIUM 24H UR-SCNC: 87 MOL/L
SODIUM SERPL-SCNC: 132 MMOL/L (ref 136–145)
SODIUM SERPL-SCNC: 133 MMOL/L (ref 136–145)
WBC # BLD AUTO: 3.4 THOUSAND/UL (ref 4.8–10.8)

## 2018-04-01 PROCEDURE — 99254 IP/OBS CNSLTJ NEW/EST MOD 60: CPT | Performed by: INTERNAL MEDICINE

## 2018-04-01 PROCEDURE — 99232 SBSQ HOSP IP/OBS MODERATE 35: CPT | Performed by: FAMILY MEDICINE

## 2018-04-01 PROCEDURE — 80053 COMPREHEN METABOLIC PANEL: CPT | Performed by: FAMILY MEDICINE

## 2018-04-01 PROCEDURE — 83690 ASSAY OF LIPASE: CPT | Performed by: FAMILY MEDICINE

## 2018-04-01 PROCEDURE — 84300 ASSAY OF URINE SODIUM: CPT | Performed by: FAMILY MEDICINE

## 2018-04-01 PROCEDURE — 86704 HEP B CORE ANTIBODY TOTAL: CPT | Performed by: INTERNAL MEDICINE

## 2018-04-01 PROCEDURE — 85027 COMPLETE CBC AUTOMATED: CPT | Performed by: FAMILY MEDICINE

## 2018-04-01 PROCEDURE — 87340 HEPATITIS B SURFACE AG IA: CPT | Performed by: INTERNAL MEDICINE

## 2018-04-01 PROCEDURE — 86803 HEPATITIS C AB TEST: CPT | Performed by: INTERNAL MEDICINE

## 2018-04-01 PROCEDURE — 83935 ASSAY OF URINE OSMOLALITY: CPT | Performed by: FAMILY MEDICINE

## 2018-04-01 PROCEDURE — 83735 ASSAY OF MAGNESIUM: CPT | Performed by: FAMILY MEDICINE

## 2018-04-01 PROCEDURE — 80074 ACUTE HEPATITIS PANEL: CPT | Performed by: FAMILY MEDICINE

## 2018-04-01 PROCEDURE — 86705 HEP B CORE ANTIBODY IGM: CPT | Performed by: INTERNAL MEDICINE

## 2018-04-01 RX ORDER — THIAMINE MONONITRATE (VIT B1) 100 MG
100 TABLET ORAL DAILY
Status: DISCONTINUED | OUTPATIENT
Start: 2018-04-01 | End: 2018-04-06 | Stop reason: HOSPADM

## 2018-04-01 RX ORDER — MAGNESIUM SULFATE HEPTAHYDRATE 40 MG/ML
2 INJECTION, SOLUTION INTRAVENOUS ONCE
Status: COMPLETED | OUTPATIENT
Start: 2018-04-01 | End: 2018-04-01

## 2018-04-01 RX ADMIN — MORPHINE SULFATE 4 MG: 4 INJECTION INTRAVENOUS at 02:08

## 2018-04-01 RX ADMIN — MAGNESIUM SULFATE HEPTAHYDRATE 2 G: 40 INJECTION, SOLUTION INTRAVENOUS at 13:28

## 2018-04-01 RX ADMIN — Medication 325 MG: at 10:00

## 2018-04-01 RX ADMIN — MORPHINE SULFATE 4 MG: 4 INJECTION INTRAVENOUS at 06:59

## 2018-04-01 RX ADMIN — FOLIC ACID 1 MG: 1 TABLET ORAL at 00:29

## 2018-04-01 RX ADMIN — FAMOTIDINE 20 MG: 10 INJECTION, SOLUTION INTRAVENOUS at 20:15

## 2018-04-01 RX ADMIN — SODIUM CHLORIDE 250 ML/HR: 0.9 INJECTION, SOLUTION INTRAVENOUS at 13:28

## 2018-04-01 RX ADMIN — SODIUM CHLORIDE 250 ML/HR: 0.9 INJECTION, SOLUTION INTRAVENOUS at 20:34

## 2018-04-01 RX ADMIN — CYANOCOBALAMIN TAB 500 MCG 1000 MCG: 500 TAB at 09:59

## 2018-04-01 RX ADMIN — SODIUM CHLORIDE 150 ML/HR: 0.9 INJECTION, SOLUTION INTRAVENOUS at 09:58

## 2018-04-01 RX ADMIN — FOLIC ACID 1 MG: 1 TABLET ORAL at 09:59

## 2018-04-01 RX ADMIN — MORPHINE SULFATE 4 MG: 4 INJECTION INTRAVENOUS at 20:21

## 2018-04-01 RX ADMIN — MORPHINE SULFATE 4 MG: 4 INJECTION INTRAVENOUS at 13:26

## 2018-04-01 RX ADMIN — ENOXAPARIN SODIUM 40 MG: 40 INJECTION SUBCUTANEOUS at 09:59

## 2018-04-01 RX ADMIN — OXYCODONE HYDROCHLORIDE AND ACETAMINOPHEN 1500 MG: 500 TABLET ORAL at 09:58

## 2018-04-01 RX ADMIN — FAMOTIDINE 20 MG: 10 INJECTION, SOLUTION INTRAVENOUS at 09:59

## 2018-04-01 RX ADMIN — SODIUM CHLORIDE 150 ML/HR: 0.9 INJECTION, SOLUTION INTRAVENOUS at 02:09

## 2018-04-01 RX ADMIN — Medication 100 MG: at 10:01

## 2018-04-01 NOTE — PROGRESS NOTES
[address] Internal Medicine Progress Note  Patient: Mari Drake 51 y o  male   MRN: [id number]  PCP: Ирина Ferguson MD  Unit/Bed#: [address] [place] [address] Encounter: [id number]  Date Of Visit: 04/01/18    Problem List:    Principal Problem:    Acute pancreatitis without infection or necrosis  Active Problems:    Abnormal LFTs    Essential hypertension    Hyponatremia    High anion gap metabolic acidosis    Occipital neuralgia    Alcohol abuse    History of gastric cancer    Hypomagnesemia      Assessment & Plan:    * Acute pancreatitis without infection or necrosis   Assessment & Plan    Noted on CT abdomen/pelvis  Likely alcoholic hepatitis  Lipase level has trended down  Repeat level in the a m  Appreciate GI input  Started on clear liquid diet  Continue IV fluids  IV morphine as needed for pain control  Patient does admit to drinking 2-3 shots of cognac every day for the last 3-4 months        Abnormal LFTs   Assessment & Plan    Patient noted to have elevated LFTs on lab work from December 2017 but appear worse during this admission  Likely due to alcohol abuse  Right upper quadrant ultrasound for further evaluation  Tylenol, salicylate level low  hepatitis panel pending  AST, ALT, alk-phos level has trended down although total bilirubin has trended up  Repeat LFTs in the a m   CT abdomen/pelvis showed hepatomegaly with prominent fatty infiltration and wedge-shaped hypoattenuation along the gallbladder  High anion gap metabolic acidosis   Assessment & Plan    Likely due to dehydration  patient does report decreased p o  Intake  Had resolved but trended up again on lab work today  Continue IV fluids and repeat lab work tomorrow to make sure the anion gap is improving        Hyponatremia   Assessment & Plan    Likely hypovolemic hyponatremia    Sodium level trending up  Continue IV fluids        Essential hypertension   Assessment & Plan    Patient does report high blood pressure previously but not on any medications as it was attributed to pain from his occipital neuralgia  Monitor blood pressures off of antihypertensives for now        Hypomagnesemia   Assessment & Plan    Replete and get a repeat level in the a m  History of gastric cancer   Assessment & Plan    status post gastrojejunostomy and then complete gastrectomy due to recurrence of tumor at the margins   Continue outpatient follow-up        Alcohol abuse   Assessment & Plan    Patient has been taking 2-3 shots of cognac every day for the last 2-4 months to help control his pain from occipital neurology  Patient advised to stop drinking alcohol completely  No signs of withdrawal  Continue thiamine, folic acid        Occipital neuralgia   Assessment & Plan    Chronic in nature for patient but has worsened over the last 3 months  Patient has a neurostimulator in  Advised patient to follow up with his neurologist and pain management doctor after discharge  Will consult Neurology while here for further management              VTE Pharmacologic Prophylaxis:   Pharmacologic: Pharmacologic VTE Prophylaxis contraindicated due to Thrombocytopenia  Mechanical VTE Prophylaxis in Place: Yes    Patient Centered Rounds: I have performed bedside rounds with nursing staff today  Discussions with Specialists or Other Care Team Provider: Yes    Education and Discussions with Family / Patient:Yes    Time Spent for Care: 30 minutes  More than 50% of total time spent on counseling and coordination of care as described above  Current Length of Stay: 1 day(s)    Current Patient Status: Inpatient     Discharge Plan:  Home    Code Status: Level 1 - Full Code    Certification Statement: The patient will continue to require additional inpatient hospital stay due to Acute pancreatitis, abnormal LFTs requiring further management      Subjective:     States that abdominal pain is better  reports improvement in his headache with morphine    Reports nausea but attributes it to occipital Neuralgia    Objective:     Vitals:   Temp (24hrs), Av 6 °F (37 6 °C), Min:98 1 °F (36 7 °C), Max:100 4 °F (38 °C)    HR:  [82-93] 82  Resp:  [18-20] 18  BP: (152-164)/(87-99) 152/90  SpO2:  [96 %-98 %] 97 %  Body mass index is 22 74 kg/m²  Input and Output Summary (last 24 hours): Intake/Output Summary (Last 24 hours) at 18 1333  Last data filed at 18 0207   Gross per 24 hour   Intake             1200 ml   Output              250 ml   Net              950 ml       Physical Exam:     Physical Exam   Constitutional: He is oriented to person, place, and time  He appears well-developed and well-nourished  No distress  HENT:   Head: Normocephalic and atraumatic  Mouth/Throat: Oropharynx is clear and moist    Eyes: EOM are normal  Right eye exhibits no discharge  Left eye exhibits no discharge  No scleral icterus  Neck: Neck supple  Cardiovascular: Normal rate and regular rhythm  Pulmonary/Chest: Effort normal and breath sounds normal  No respiratory distress  He has no wheezes  He has no rales  Abdominal: Soft  Bowel sounds are normal  He exhibits no distension  There is no tenderness  Musculoskeletal: He exhibits no edema  Left upper extremity atrophy - at baseline as per patient  Weakness noted of left hand compared to the right - chronic   Neurological: He is alert and oriented to person, place, and time  No cranial nerve deficit  Skin: He is not diaphoretic  Psychiatric: He has a normal mood and affect         Additional Data:     Labs:      Results from last 7 days  Lab Units 18  0656 18  0903   WBC Thousand/uL 3 40* 5 50   HEMOGLOBIN g/dL 10 9* 13 7*   HEMATOCRIT % 33 6* 41 2*   PLATELETS Thousands/uL 71* 135   LYMPHO PCT %  --  27   MONO PCT MAN %  --  6       Results from last 7 days  Lab Units 18  0656   SODIUM mmol/L 132*   POTASSIUM mmol/L 3 7   CHLORIDE mmol/L 98*   CO2 mmol/L 20*   BUN mg/dL 5   CREATININE mg/dL 0 81 CALCIUM mg/dL 7 1*   TOTAL PROTEIN g/dL 5 8*   BILIRUBIN TOTAL mg/dL 3 50*   ALK PHOS U/L 266*   ALT U/L 117*   AST U/L 256*   GLUCOSE RANDOM mg/dL 156*           * I Have Reviewed All Lab Data Listed Above  * Additional Pertinent Lab Tests Reviewed: [address] Admission Reviewed    Imaging:  Ct Abdomen Pelvis With Contrast    Result Date: 3/31/2018  Narrative: CT ABDOMEN AND PELVIS WITH IV CONTRAST INDICATION:   pancreatitis/abdominal pain  COMPARISON: 7/8/2011 TECHNIQUE:  CT examination of the abdomen and pelvis was performed  Axial, sagittal, and coronal 2D reformatted images were created from the source data and submitted for interpretation  Radiation dose length product (DLP) for this visit:  500 28 mGy-cm   This examination, like all CT scans performed in the [facility], was performed utilizing techniques to minimize radiation dose exposure, including the use of iterative  reconstruction and automated exposure control  IV Contrast:  100 mL of iohexol (OMNIPAQUE) Enteric Contrast:  Enteric contrast was not administered  FINDINGS: ABDOMEN LOWER CHEST:  No clinically significant abnormality identified in the visualized lower chest  LIVER/BILIARY TREE:  Enlarged fatty liver  Additional wedge-shaped hypodensities right hepatic lobe of uncertain etiology with infarctions difficult to exclude  GALLBLADDER:  No calcified gallstones  No pericholecystic inflammatory change  SPLEEN:  Unremarkable  PANCREAS:  Pancreatic head associated inflammatory change and mild free fluid consistent with pancreatitis without cyst, pseudocyst or abscess  ADRENAL GLANDS:  Unremarkable  KIDNEYS/URETERS:  Right lower pole 6 mm nonobstructive calculus  STOMACH AND BOWEL:  Mild to moderate sliding-type hiatal hernia noted  Inflammatory change noted at the hepatic flexure and ascending colon likely reflecting free fluid tracking inferiorly from the pancreas    Duodenal wall thickening and inflammatory change also noted  APPENDIX:  No findings to suggest appendicitis  ABDOMINOPELVIC CAVITY:  No ascites or free intraperitoneal air  No lymphadenopathy  VESSELS:  Unremarkable for patient's age  PELVIS REPRODUCTIVE ORGANS:  Unremarkable for patient's age  URINARY BLADDER:  Unremarkable  ABDOMINAL WALL/INGUINAL REGIONS:  Unremarkable  OSSEOUS STRUCTURES:  No acute fracture or destructive osseous lesion  Impression: 1  Pancreatic head inflammatory change and mild free fluid consistent with acute pancreatitis without complication  Associated inflammatory change involving the hepatic flexure and ascending colon likely reflects dependent retroperitoneal fluid rather than intrinsic colonic disease  Additionally, duodenal wall thickening and inflammatory change is likely reactive  2   Hepatomegaly with prominent fatty infiltration and additional regions of wedge-shaped hypoattenuation along the gallbladder  Findings could reflect more focal fat (favored) with wedge-shaped infarction difficult to entirely exclude  Vascular structures appear patent  Further evaluation with MRI suggested for further characterization  3   Hiatal hernia  4   Right lower renal pole 6 mm nonobstructive calculus   Workstation performed: [id number]     Imaging Reports Reviewed by myself    Cultures:   Blood Culture: No results found for: BLOODCX  Urine Culture: No results found for: URINECX  Sputum Culture: No components found for: SPUTUMCX  Wound Culture: No results found for: WOUNDCULT    Last 24 Hours Medication List:     Current Facility-Administered Medications:  vitamin C 1,500 mg Oral Daily Kirti Estevez, DO    cyanocobalamin 1,000 mcg Oral Daily Kirti Estevez, DO    famotidine 20 mg Intravenous Q12H [facility] Kirti Estevez, DO    ferrous sulfate 325 mg Oral Daily With Breakfast Kirti Estevez, DO    folic acid 1 mg Oral Daily Dick Lam MD    morphine injection 4 mg Intravenous Q4H PRN Kirti Estevez, DO    ondansetron 4 mg Intravenous Q6H PRN Kirti Estevez DO    sodium chloride 250 mL/hr Intravenous Continuous Tanmay Acosta MD Last Rate: 250 mL/hr (04/01/18 1328)   thiamine 100 mg Oral Daily Oscar Woods MD    traMADol 50 mg Oral Q6H PRN Kirti Estevez DO         Today, Patient Was Seen By: Shante Young DO    ** Please Note: Dragon 360 Dictation voice to text software may have been used in the creation of this document   **

## 2018-04-01 NOTE — CONSULTS
Consultation - [facility]  Dominique Found 51 y o  male MRN: [id number]  Unit/Bed#: [address] [place] [address] Encounter: [id number]        Consults    ASSESSMENT/PLAN:     76-year-old gentleman with a history of occipital neuralgia, neurostimulator placement, remote history of gastric cancer status post gastrojejunostomy and then completion gastrectomy due to recurrence of tumor at the margins now with several days of generalized abdominal pain found to have elevated lipase, elevated LFTs and pancreatitis on imaging studies in the setting of increased alcohol consumption  1   Epigastric abdominal pain:  Most consistent with acute alcoholic pancreatitis, cannot exclude biliary etiology at this time  -increase IV fluids to 250 cc/hour  -start him on sips of clears today and gradually increase as tolerated  -continue to follow LFTs and lipase  -we will check ultrasound to exclude biliary etiology    2  Elevated LFTs:  Most likely secondary to alcohol abuse and increased recent consumption, as above will evaluate for biliary source with ultrasound          ______________________________________________________________________    Reason for Consult / Principal Problem: Acute pancreatitis without infection or necrosis    HPI: Dominique Found is a 51y o  year old male who presents with Acute pancreatitis without infection or necrosis the patient reports that he he has had worsening symptoms of his occipital neuralgia over the last several months, he suspects failure of his neurostimulator  Associated with this he gets abdominal pain and cramping which had been occurring intermittently with associated nausea what he describes as gagging over the past 2 months  However over the last 5 days he has had severe abdominal pain generalized with bilateral flank radiation, he also notes having persistent nausea and vomiting with poor oral intake a both solids and liquids    The patient reports he drinks 2 to 3 shots of cognac nightly however on the 30th due to a birthday party had fiber 6 shots  He denies any fevers reports some subjective chills  Denies any diarrhea, constipation, melena, rectal bleeding  His weight has been stable  Denies any significant NSAID use  He also reports severe  insomnia which she reports is why he drinks  The patient has significant history of signet cell gastric carcinoma, resected in 2011, he had revision surgery with total gastrectomy and Nasir-en-Y esophagojejunostomy at that time due to recurrence of tumor  Upon presentation to the emergency room he was found to have elevated LFTs, elevated lipase  CT scan demonstrated inflammatory changes of his pancreas consistent with pancreatitis  Overnight patient has been on pain medicines and IV fluids reports that he feels better this morning  Review of Systems: The remainder of the review of systems was negative except for the pertinent positives noted in HPI  Historical Information   Past Medical History:   Diagnosis Date    Anemia     Cancer of stomach ([address] )     Hypertension     Insomnia     Occipital neuralgia     Other mechanical complication of implanted electronic neurostimulator of spinal cord electrode (lead), sequela     maybe not working   [employer] of spinal cord (HCC)     Vitamin D deficiency      Past Surgical History:   Procedure Laterality Date    BACK SURGERY      cervical discectomy    GASTRECTOMY       Social History   History   Alcohol Use    Yes     Comment: cognac daily     History   Drug Use No     History   Smoking Status    Never Smoker   Smokeless Tobacco    Never Used     History reviewed  No pertinent family history      Meds/Allergies     Prescriptions Prior to Admission   Medication    Ascorbic Acid (VITAMIN C) 1000 MG tablet    Cyanocobalamin (VITAMIN B 12 PO)    ferrous sulfate 220 (44 Fe) mg/5 mL solution    traMADol (ULTRAM) 50 mg tablet     Current Facility-Administered Medications   Medication Dose Route Frequency    ascorbic acid (VITAMIN C) tablet 1,500 mg  1,500 mg Oral Daily    cyanocobalamin (VITAMIN B-12) tablet 1,000 mcg  1,000 mcg Oral Daily    enoxaparin (LOVENOX) subcutaneous injection 40 mg  40 mg Subcutaneous Daily    famotidine (PEPCID) injection 20 mg  20 mg Intravenous Q12H [address]    ferrous sulfate tablet 325 mg  325 mg Oral Daily With Breakfast    folic acid (FOLVITE) tablet 1 mg  1 mg Oral Daily    morphine (PF) 4 mg/mL injection 4 mg  4 mg Intravenous Q4H PRN    ondansetron (ZOFRAN) injection 4 mg  4 mg Intravenous Q6H PRN    sodium chloride 0 9 % infusion  150 mL/hr Intravenous Continuous    thiamine (VITAMIN B1) tablet 100 mg  100 mg Oral Daily    traMADol (ULTRAM) tablet 50 mg  50 mg Oral Q6H PRN       No Known Allergies    Objective     Blood pressure 164/99, pulse 86, temperature 100 2 °F (37 9 °C), temperature source Tympanic, resp  rate 19, height 6' (1 829 m), weight 76 1 kg (167 lb 11 2 oz), SpO2 96 %        Intake/Output Summary (Last 24 hours) at 04/01/18 0959  Last data filed at 04/01/18 0207   Gross per 24 hour   Intake             2200 ml   Output              250 ml   Net             1950 ml       PHYSICAL EXAM     GEN: well nourished, well developed, no acute distress  HEENT: anicteric, MMM, no cervical or supraclavicular lymphadenopathy  CV: RRR, no m/r/g  CHEST: CTA b/l, no WRR  ABD: +BS, soft, NT/ND, no hepatosplenomegaly, well-healed surgical scars   EXT: no c/c/e, left upper extremity atrophy  SKIN: no rashes,  NEURO: aaox3    Lab Results:   Admission on 03/31/2018   Component Date Value    WBC 03/31/2018 5 50     RBC 03/31/2018 4 49*    Hemoglobin 03/31/2018 13 7*    Hematocrit 03/31/2018 41 2*    MCV 03/31/2018 92     MCH 03/31/2018 30 6     MCHC 03/31/2018 33 3     RDW 03/31/2018 21 3*    MPV 03/31/2018 8 8*    Platelets 91/10/8484 135     nRBC 03/31/2018 0     Sodium 03/31/2018 127*    Potassium 03/31/2018 4 7     Chloride 03/31/2018 90*    CO2 03/31/2018 12*    Anion Gap 03/31/2018 25*    BUN 03/31/2018 13     Creatinine 03/31/2018 0 86     Glucose 03/31/2018 185*    Calcium 03/31/2018 8 2*    AST 03/31/2018 472*    ALT 03/31/2018 207*    Alkaline Phosphatase 03/31/2018 334*    Total Protein 03/31/2018 6 9     Albumin 03/31/2018 2 8*    Total Bilirubin 03/31/2018 2 10*    eGFR 03/31/2018 102     Lipase 03/31/2018 1231*    Segmented % 03/31/2018 64     Bands % 03/31/2018 3     Lymphocytes % 03/31/2018 27     Monocytes % 03/31/2018 6     Neutrophils Absolute 03/31/2018 3 69     Lymphocytes Absolute 03/31/2018 1 49     Monocytes Absolute 03/31/2018 0 33     Total Counted 03/31/2018 100     Platelet Estimate 37/26/2041 Borderline*    Sodium 03/31/2018 127*    Osmolality Serum 03/31/2018 283     Sodium 03/31/2018 133*    Potassium 03/31/2018 4 1     Chloride 03/31/2018 99*    CO2 03/31/2018 28     Anion Gap 03/31/2018 6     BUN 03/31/2018 12     Creatinine 03/31/2018 0 93     Glucose 03/31/2018 180*    Calcium 03/31/2018 7 0*    eGFR 03/31/2018 96     Acetaminophen Level 80/40/8773 <2*    Salicylate Lvl 63/49/4870 <3 0*    Lipase 04/01/2018 524*    Sodium 04/01/2018 132*    Potassium 04/01/2018 3 7     Chloride 04/01/2018 98*    CO2 04/01/2018 20*    Anion Gap 04/01/2018 14*    BUN 04/01/2018 5     Creatinine 04/01/2018 0 81     Glucose 04/01/2018 156*    Calcium 04/01/2018 7 1*    AST 04/01/2018 256*    ALT 04/01/2018 117*    Alkaline Phosphatase 04/01/2018 266*    Total Protein 04/01/2018 5 8*    Albumin 04/01/2018 2 3*    Total Bilirubin 04/01/2018 3 50*    eGFR 04/01/2018 104     Magnesium 04/01/2018 1 2*    WBC 04/01/2018 3 40*    RBC 04/01/2018 3 56*    Hemoglobin 04/01/2018 10 9*    Hematocrit 04/01/2018 33 6*    MCV 04/01/2018 94     MCH 04/01/2018 30 8     MCHC 04/01/2018 32 6     RDW 04/01/2018 20 2*    Platelets 49/06/4268 71*    MPV 04/01/2018 8 5*    Platelet Estimate 74/33/8207 Decreased*     Imaging Studies: I have personally reviewed pertinent reports

## 2018-04-01 NOTE — SOCIAL WORK
DASH discussion completed  Discussed goals of making sure pt's needs are met upon discharge, pt's preferences are taken into account, pt understands her health condition, medications and symptoms to watch for after returning home and pt is aware of any follow up appointments recommended by hospital physician  SPOKE WITH THE PT AND HIS WIFE AT THE BEDSIDE  PT LIVES WITH THE WIFE AND HAS NO HHC OR DME IN THE HOME AT THIS TIME  PT IS GENERALLY INDEPENDENT HOWEVER NOTED THAT HE HAS A SPINAL CORD STIM FROM WORK COMP BUT THIS ADMIT IS NOT LIKELY RELATED TO ANY OF THAT  WIFE CONCERNED D/T HIS INABILITY TO WORK AND THE LACK OF INSURANCE COVERAGE FOR ANY OTHER ISSUES  REFERRED HER TO [facility]  CONTACT INFORMATION PROVIDED ON AVS FOR FOLLOW UP WHEN THEY GET HOME   PT USES THE [address] [place] [address]

## 2018-04-01 NOTE — PLAN OF CARE
DISCHARGE PLANNING     Discharge to home or other facility with appropriate resources Progressing        GASTROINTESTINAL - ADULT     Minimal or absence of nausea and/or vomiting Progressing     Maintains or returns to baseline bowel function Progressing     Maintains adequate nutritional intake Progressing        Knowledge Deficit     Patient/family/caregiver demonstrates understanding of disease process, treatment plan, medications, and discharge instructions Progressing        Nutrition/Hydration-ADULT     Nutrient/Hydration intake appropriate for improving, restoring or maintaining nutritional needs Progressing        PAIN - ADULT     Verbalizes/displays adequate comfort level or baseline comfort level Progressing

## 2018-04-01 NOTE — ASSESSMENT & PLAN NOTE
Status post gastrojejunostomy and then complete gastrectomy due to recurrence of tumor at the margins   Continue outpatient follow-up

## 2018-04-02 ENCOUNTER — APPOINTMENT (INPATIENT)
Dept: RADIOLOGY | Facility: HOSPITAL | Age: 48
DRG: 439 | End: 2018-04-02

## 2018-04-02 PROBLEM — E87.29 HIGH ANION GAP METABOLIC ACIDOSIS: Status: RESOLVED | Noted: 2018-03-31 | Resolved: 2018-04-02

## 2018-04-02 PROBLEM — E87.2 HIGH ANION GAP METABOLIC ACIDOSIS: Status: RESOLVED | Noted: 2018-03-31 | Resolved: 2018-04-02

## 2018-04-02 LAB
ALBUMIN SERPL BCP-MCNC: 2.3 G/DL (ref 3.5–5)
ALP SERPL-CCNC: 278 U/L (ref 46–116)
ALT SERPL W P-5'-P-CCNC: 114 U/L (ref 12–78)
ANION GAP SERPL CALCULATED.3IONS-SCNC: 7 MMOL/L (ref 4–13)
AST SERPL W P-5'-P-CCNC: 183 U/L (ref 5–45)
BILIRUB SERPL-MCNC: 3.3 MG/DL (ref 0.2–1)
BUN SERPL-MCNC: 3 MG/DL (ref 5–25)
CALCIUM SERPL-MCNC: 7.6 MG/DL (ref 8.3–10.1)
CHLORIDE SERPL-SCNC: 102 MMOL/L (ref 100–108)
CO2 SERPL-SCNC: 27 MMOL/L (ref 21–32)
CREAT SERPL-MCNC: 0.8 MG/DL (ref 0.6–1.3)
GFR SERPL CREATININE-BSD FRML MDRD: 105 ML/MIN/1.73SQ M
GLUCOSE SERPL-MCNC: 98 MG/DL (ref 65–140)
HAV IGM SER QL: NORMAL
HBV CORE AB SER QL: NORMAL
HBV CORE IGM SER QL: NORMAL
HBV CORE IGM SER QL: NORMAL
HBV SURFACE AG SER QL: NORMAL
HBV SURFACE AG SER QL: NORMAL
HCV AB SER QL: NORMAL
HCV AB SER QL: NORMAL
LIPASE SERPL-CCNC: 397 U/L (ref 73–393)
MAGNESIUM SERPL-MCNC: 1.7 MG/DL (ref 1.6–2.6)
POTASSIUM SERPL-SCNC: 4.1 MMOL/L (ref 3.5–5.3)
PROT SERPL-MCNC: 5.7 G/DL (ref 6.4–8.2)
SODIUM SERPL-SCNC: 136 MMOL/L (ref 136–145)

## 2018-04-02 PROCEDURE — 99232 SBSQ HOSP IP/OBS MODERATE 35: CPT | Performed by: FAMILY MEDICINE

## 2018-04-02 PROCEDURE — 76705 ECHO EXAM OF ABDOMEN: CPT

## 2018-04-02 PROCEDURE — 80053 COMPREHEN METABOLIC PANEL: CPT | Performed by: FAMILY MEDICINE

## 2018-04-02 PROCEDURE — 83690 ASSAY OF LIPASE: CPT | Performed by: FAMILY MEDICINE

## 2018-04-02 PROCEDURE — 99254 IP/OBS CNSLTJ NEW/EST MOD 60: CPT | Performed by: PSYCHIATRY & NEUROLOGY

## 2018-04-02 PROCEDURE — 99232 SBSQ HOSP IP/OBS MODERATE 35: CPT | Performed by: INTERNAL MEDICINE

## 2018-04-02 PROCEDURE — 83735 ASSAY OF MAGNESIUM: CPT | Performed by: FAMILY MEDICINE

## 2018-04-02 RX ORDER — MAGNESIUM SULFATE HEPTAHYDRATE 40 MG/ML
2 INJECTION, SOLUTION INTRAVENOUS ONCE
Status: COMPLETED | OUTPATIENT
Start: 2018-04-02 | End: 2018-04-03

## 2018-04-02 RX ORDER — IBUPROFEN 400 MG/1
400 TABLET ORAL ONCE
Status: COMPLETED | OUTPATIENT
Start: 2018-04-02 | End: 2018-04-02

## 2018-04-02 RX ORDER — OXYCODONE HYDROCHLORIDE 5 MG/1
5 TABLET ORAL EVERY 4 HOURS PRN
Status: DISCONTINUED | OUTPATIENT
Start: 2018-04-02 | End: 2018-04-06 | Stop reason: HOSPADM

## 2018-04-02 RX ADMIN — MORPHINE SULFATE 4 MG: 4 INJECTION INTRAVENOUS at 05:02

## 2018-04-02 RX ADMIN — MORPHINE SULFATE 4 MG: 4 INJECTION INTRAVENOUS at 00:35

## 2018-04-02 RX ADMIN — CYANOCOBALAMIN TAB 500 MCG 1000 MCG: 500 TAB at 11:25

## 2018-04-02 RX ADMIN — IBUPROFEN 400 MG: 400 TABLET, FILM COATED ORAL at 22:41

## 2018-04-02 RX ADMIN — FAMOTIDINE 20 MG: 10 INJECTION, SOLUTION INTRAVENOUS at 09:06

## 2018-04-02 RX ADMIN — FOLIC ACID 1 MG: 1 TABLET ORAL at 11:26

## 2018-04-02 RX ADMIN — SODIUM CHLORIDE 250 ML/HR: 0.9 INJECTION, SOLUTION INTRAVENOUS at 00:32

## 2018-04-02 RX ADMIN — SODIUM CHLORIDE 150 ML/HR: 0.9 INJECTION, SOLUTION INTRAVENOUS at 23:54

## 2018-04-02 RX ADMIN — ONDANSETRON 4 MG: 2 INJECTION INTRAMUSCULAR; INTRAVENOUS at 20:33

## 2018-04-02 RX ADMIN — MORPHINE SULFATE 4 MG: 4 INJECTION INTRAVENOUS at 11:36

## 2018-04-02 RX ADMIN — SODIUM CHLORIDE 250 ML/HR: 0.9 INJECTION, SOLUTION INTRAVENOUS at 04:34

## 2018-04-02 RX ADMIN — VERAPAMIL HYDROCHLORIDE 120 MG: 120 TABLET, FILM COATED, EXTENDED RELEASE ORAL at 17:24

## 2018-04-02 RX ADMIN — MAGNESIUM SULFATE HEPTAHYDRATE 2 G: 40 INJECTION, SOLUTION INTRAVENOUS at 10:22

## 2018-04-02 RX ADMIN — FAMOTIDINE 20 MG: 10 INJECTION, SOLUTION INTRAVENOUS at 20:19

## 2018-04-02 RX ADMIN — OXYCODONE HYDROCHLORIDE AND ACETAMINOPHEN 1500 MG: 500 TABLET ORAL at 11:26

## 2018-04-02 RX ADMIN — MORPHINE SULFATE 4 MG: 4 INJECTION INTRAVENOUS at 16:05

## 2018-04-02 RX ADMIN — MORPHINE SULFATE 4 MG: 4 INJECTION INTRAVENOUS at 20:19

## 2018-04-02 RX ADMIN — ONDANSETRON 4 MG: 2 INJECTION INTRAMUSCULAR; INTRAVENOUS at 11:35

## 2018-04-02 RX ADMIN — Medication 100 MG: at 11:27

## 2018-04-02 NOTE — CONSULTS
[address]   Neurology Initial Consult    Connie Mayes is a 51 y o  male  3 [facility] [address] [place]-*          Information obtained from:   Chief Complaint   Patient presents with    Headache     headache ongoing x 1 month,has neurostimulator implant x 2 1/2 yrs for occipital neuralgia,vomiting and losing weight,can't see neurologist till April 17         Assessment/Plan:    1  Intractable migraines  2  H/o occipital  Neuralgia  3  Paresthesia   4  H/o cervical syrinx  5  H/o cervical disc disease     Patient likely had occipital neuralgia at first and cervicogenic headaches from disc disease however now has component of migraine features  After lengthy discussion, it seems that he has not been tried on verapamil  Will use 120mg ER once daily for prevention  Discussed that it can at times cause constipation so should take magnesium oxide 400mg along with it  Start carbamazepine 100mg bid after one week  This can help with paresthesia which can a sx of syrinx  Discussed that he should consider botox injections for headache  Since he has a lot of sensitivity to various medications, botox would be good option and has shown to be effective  Will follow up with him in clinic in 4-6 weeks  Discussed plan at length with patient, wife and primary team            HPI:  Connie Mayes is a 50 yo M with PMH of gastric cancer, occipital neuralgia, cervical syrinx presents with cc of headache for past 3 months  Patient has complicated history  He had neck injury in 2011 which resulted in disc displacement which was impinging on cervical spinal cord  3-4 years later he had surgical correction  Patient was experiencing left sided neck and head pain for a long time and and pain stimulator placed  It was working well at first for past few months patient's pain has returned  He also reports left sided neck, face and head burning, pins and needle sensation   He denies headache but describes squeezing, pressured type of intracranial pain that is refractory to medications  He gets associated nausea, vomiting, light and noise sensitivity  He reports pain on 25 days of month  Intensity can vary from time to time  He does follow with Dr Juan Manuel Mukherjee as well  He says he was previously tried on multiple pain and headache preventive medications and when his pain was refractory, stimulator was placed  Patient has been treated for occipital neuralgia, cervical disc disease at pain center with local anesthetics  At one point he was on so much pain medications that it causes gastric ulcers and later cancer which is now in remission  Pain has been so severe that he has turned to drinking alcohol for past 3 months and now is being treated for acute pancreatitis  Patient suffers from insomnia due to headache as well  He had MRI brain a few years ago and it was negative       Past Medical History:   Diagnosis Date    Anemia     Cancer of stomach ([address] )     Hypertension     Insomnia     Occipital neuralgia     Other mechanical complication of implanted electronic neurostimulator of spinal cord electrode (lead), sequela     maybe not working   CarMax of spinal cord (HCC)     Vitamin D deficiency        Past Surgical History:   Procedure Laterality Date    BACK SURGERY      cervical discectomy    GASTRECTOMY         No Known Allergies      Current Facility-Administered Medications:     ascorbic acid (VITAMIN C) tablet 1,500 mg, 1,500 mg, Oral, Daily, Kirti Revankar, DO, 1,500 mg at 04/02/18 1126    cyanocobalamin (VITAMIN B-12) tablet 1,000 mcg, 1,000 mcg, Oral, Daily, Kirti Revankar, DO, 1,000 mcg at 04/02/18 1125    famotidine (PEPCID) injection 20 mg, 20 mg, Intravenous, Q12H [facility], iKrti Revankar, DO, 20 mg at 04/02/18 0906    ferrous sulfate tablet 325 mg, 325 mg, Oral, Daily With Breakfast, Kirti Espinosaar DO, Stopped at 73/16/69 2080    folic acid (FOLVITE) tablet 1 mg, 1 mg, Oral, Daily, Memory Kawasaki, MD, 1 mg at 04/02/18 1126    [START ON 4/3/2018] magnesium oxide (MAG-OX) tablet 200 mg, 200 mg, Oral, Daily, Kirti Revankar, DO    morphine (PF) 4 mg/mL injection 4 mg, 4 mg, Intravenous, Q4H PRN, Kirti Revankar, DO, 4 mg at 04/02/18 1605    ondansetron (ZOFRAN) injection 4 mg, 4 mg, Intravenous, Q6H PRN, Kirti Revankar, DO, 4 mg at 04/02/18 1135    oxyCODONE (ROXICODONE) IR tablet 5 mg, 5 mg, Oral, Q4H PRN, Kirti Revankar, DO    sodium chloride 0 9 % infusion, 150 mL/hr, Intravenous, Continuous, Naun Alba PA-C, Last Rate: 150 mL/hr at 04/02/18 1606, 150 mL/hr at 04/02/18 1606    thiamine (VITAMIN B1) tablet 100 mg, 100 mg, Oral, Daily, Katt Trejo MD, 100 mg at 04/02/18 1127    traMADol (ULTRAM) tablet 50 mg, 50 mg, Oral, Q6H PRN, Kirti Revankar, DO    verapamil (CALAN-SR) CR tablet 120 mg, 120 mg, Oral, After Víctor Wang MD, 120 mg at 04/02/18 1724    Social History     Social History    Marital status: /Civil Union     Spouse name: N/A    Number of children: N/A    Years of education: N/A     Occupational History    Not on file  Social History Main Topics    Smoking status: Never Smoker    Smokeless tobacco: Never Used    Alcohol use Yes      Comment: cognac daily    Drug use: No    Sexual activity: Not on file     Other Topics Concern    Not on file     Social History Narrative    No narrative on file       History reviewed  No pertinent family history  Review of systems:  Please see HPI for positive symptoms  No fever, no chills, no weight change  Chronic headache, paresthesia in head Ocular: No drainage, no blurred vision  HEENT:  No sore throat, earache, or congestion  No neck pain  COR:  No chest pain  No palpitations  Lungs:  no sob, wheezing,  GI:  no  nausea, no vomiting, no diarrhea, no constipation, no anorexia  :  No dysuria, frequency, or urgency  No hematuria  Musculoskeletal:  No joint pain or swelling or edema  Skin:  No rash or itching  Psychiatric:  no anxiety, no depression  Endocrine:  No polyuria or polydipsia  Physical examination:  Vitals:    04/02/18 1351   BP: 135/86   Pulse: 89   Resp: 20   Temp: 100 °F (37 8 °C)   SpO2: 96%       GENERAL APPEARANCE:  The patient is alert, oriented  He is in no acute distress  HEENT:  Head is normocephalic  The sinuses are otherwise nontender  Pupils are equal and reactive  NECK:  Supple without lymphadenopathy  HEART:  Regular rate and rhythm  LUNGS:  clear to auscultation  No crackles or wheezes are heard  ABDOMEN:  Soft, nontender, nondistended with good bowel sounds heard  EXTREMITIES:  Without cyanosis, clubbing or edema  Mental status: The patient is alert, attentive, and oriented  Speech is clear and fluent, good repetition, comprehension, and naming  he recalls 3/3 objects at 5 minutes  Cranial nerves:  CN II: Visual fields are full to confrontation  Fundoscopic exam is normal with sharp discs and no vascular changes    Pupils are 3 mm and  reactive to light  CN III, IV, VI: At primary gaze, there is no eye deviation  CN V: Facial sensation is intact to pinprick in all 3 divisions bilaterally  Corneal responses are intact  CN VII: Face is symmetric with normal eye closure and smile  CN VIII: Hearing is normal to rubbing fingers  CN IX, X: Palate elevates symmetrically  Phonation is normal   CN XI: Head turning and shoulder shrug are intact  CN XII: Tongue is midline with normal movements and no atrophy  Motor: There is no pronator drift of out-stretched arms    Muscle bulk and tone are normal      Muscle exam  Arm Right Left Leg Right Left   Deltoid 5/5 4+/5 Iliopsoas 5/5 5/5   Biceps 5/5 4+/5 Quads 5/5 5/5   Triceps 5/5 5/5 Hamstrings 5/5 5/5   Wrist Extension 5/5 5/5 Ankle Dorsi Flexion 5/5 5/5   Wrist Flexion 5/5 5/5 Ankle Plantar Flexion 5/5 5/5   Interossei 5/5 4+/5 Ankle Eversion 5/5 5/5   APB 5/5 5/5 Ankle Inversion 5/5 5/5       Reflexes   YODIT Cardona Plantars Christianson's   Right 2+ 2+ 2+ 2+ 2+ Downgoing Not present   Left 2+ 2+ 2+ 2+ 2+ Downgoing Not present     Sensory:  Decreased to light touch, pinprick over left arm  Intact position sense, and vibration sense are intact in fingers and toes  Coordination:  Rapid alternating movements and fine finger movements are intact  There is no dysmetria on finger-to-nose and heel-knee-shin  There are no abnormal or extraneous movements  Romberg negative  Gait/Stance:  Normal heel/toe walking and tandem gait  Lab Results   Component Value Date    WBC 3 40 (L) 04/01/2018    HGB 10 9 (L) 04/01/2018    HCT 33 6 (L) 04/01/2018    MCV 94 04/01/2018    PLT 71 (L) 04/01/2018     No results found for: HGBA1C  Lab Results   Component Value Date     (H) 04/02/2018     (H) 04/02/2018    ALKPHOS 278 (H) 04/02/2018    BILITOT 3 30 (H) 04/02/2018     Lab Results   Component Value Date    GLUCOSE 98 04/02/2018    CALCIUM 7 6 (L) 04/02/2018     04/02/2018    K 4 1 04/02/2018    CO2 27 04/02/2018     04/02/2018    BUN 3 (L) 04/02/2018    CREATININE 0 80 04/02/2018         Radiology          Review of reports and notes reveal:    Ct Abdomen Pelvis With Contrast    Result Date: 3/31/2018  1  Pancreatic head inflammatory change and mild free fluid consistent with acute pancreatitis without complication  Associated inflammatory change involving the hepatic flexure and ascending colon likely reflects dependent retroperitoneal fluid rather than intrinsic colonic disease  Additionally, duodenal wall thickening and inflammatory change is likely reactive  2   Hepatomegaly with prominent fatty infiltration and additional regions of wedge-shaped hypoattenuation along the gallbladder  Findings could reflect more focal fat (favored) with wedge-shaped infarction difficult to entirely exclude  Vascular structures appear patent  Further evaluation with MRI suggested for further characterization  3   Hiatal hernia   4  Right lower renal pole 6 mm nonobstructive calculus  Workstation performed: [id number]     Us Right Upper Quadrant With Liver Dopplers    Result Date: 4/2/2018  Moderate hepatomegaly and fatty liver change  There is no evidence of biliary ductal dilatation  There is a 1 cm nonobstructing right kidney lower pole calyceal stone  Workstation performed: [id number]             Thank you for this consult  Total time of encounter: 70 min   More than 50% of time was spent in counseling and coordination of care of patient  [facility]  [address] [place] [address],[address]  [place] [address]

## 2018-04-02 NOTE — PROGRESS NOTES
[address] Internal Medicine Progress Note  Patient: Jess Alves 51 y o  male   MRN: [id number]  PCP: Lian Ann MD  Unit/Bed#: [address] [place] [address] Encounter: [id number]  Date Of Visit: 04/02/18    Problem List:    Principal Problem:    Acute pancreatitis without infection or necrosis  Active Problems:    Abnormal LFTs    Essential hypertension    Hyponatremia    Occipital neuralgia    Alcohol abuse    History of gastric cancer    Hypomagnesemia      Assessment & Plan:    * Acute pancreatitis without infection or necrosis   Assessment & Plan    Noted on CT abdomen/pelvis  Likely alcoholic hepatitis  Lipase level has trended down  Repeat level in the a m  Check lipid panel in the a   Appreciate GI input  Patient clinically improving  Advance to full liquid diet after ultrasound today  Continue IV fluids  IV morphine, oxycodone IR as needed for pain control  Patient does admit to drinking 2-3 shots of cognac every day for the last 3-4 months        Abnormal LFTs   Assessment & Plan    Patient noted to have elevated LFTs on lab work from December 2017 but appear worse during this admission  Likely due to alcohol abuse  Right upper quadrant ultrasound for further evaluation  Tylenol, salicylate level low  hepatitis panel nonreactive  AST, ALT, total bilirubin level trended down  Alk-phos only mildly elevated compared to yesterday  Repeat LFTs in the a    CT abdomen/pelvis showed hepatomegaly with prominent fatty infiltration and wedge-shaped hypoattenuation along the gallbladder  Hyponatremia   Assessment & Plan    Likely hypovolemic hyponatremia  Sodium level improving  Continue IV fluids  Repeat labs in the a m  Essential hypertension   Assessment & Plan    Patient does report high blood pressure previously but not on any medications as it was attributed to pain from his occipital neuralgia  Blood pressures seem like they are improving    Monitor blood pressures off of antihypertensives for now        High anion gap metabolic acidosis-resolved as of 4/2/2018   Assessment & Plan    Likely due to dehydration  patient does report decreased p o  Intake  Resolved with IV fluids   Repeat lab work tomorrow        Hypomagnesemia   Assessment & Plan    Replete and get a repeat level in the a m        History of gastric cancer   Assessment & Plan    Status post gastrojejunostomy and then complete gastrectomy due to recurrence of tumor at the margins   Continue outpatient follow-up        Alcohol abuse   Assessment & Plan    Patient has been taking 2-3 shots of cognac every day for the last 2-4 months to help control his pain from occipital neurology  Patient advised to stop drinking alcohol completely  No signs of withdrawal  Continue thiamine, folic acid        Occipital neuralgia   Assessment & Plan    Chronic in nature for patient but has worsened over the last 3 months  Patient has a neurostimulator in place  Advised patient to follow up with neurologist and pain management doctor after discharge  Discussed case with Neurology earlier  As per Neurology patient's presenting symptoms are not only due to occipital neuralgia but there is also component of trigeminal neuralgia and migraine headache  Patient started on verapamil  mg daily  Since verapamil can cause diarrhea, patient can be on 200-400 mg of magnesium oxide daily as per Neurology  200 mg started  Plan is to start patient on carbamazepine 100 mg b i d  1 week later              VTE Pharmacologic Prophylaxis:   Pharmacologic: Pharmacologic VTE Prophylaxis contraindicated due to Thrombocytopenia  Mechanical VTE Prophylaxis in Place: Yes    Patient Centered Rounds: I have performed bedside rounds with nursing staff today  Discussions with Specialists or Other Care Team Provider: Yes    Education and Discussions with Family / Patient:Yes    Time Spent for Care: 30 min    More than 50% of total time spent on counseling and coordination of care as described above  Current Length of Stay: 2 day(s)    Current Patient Status: Inpatient     Discharge Plan:  Home    Code Status: Level 1 - Full Code    Certification Statement: The patient will continue to require additional inpatient hospital stay due to acute pancreatitis, abnormal LFTs, occipital Neuralgia requiring further management      Subjective:     States abdominal pain is much better, 3/10 in intensity  Tolerating diet  Reports occasional dry heaving but states that this is due to his occipital neuralgia    Objective:     Vitals:   Temp (24hrs), Av 2 °F (37 3 °C), Min:98 7 °F (37 1 °C), Max:100 °F (37 8 °C)    HR:  [] 89  Resp:  [19-20] 20  BP: (134-136)/(85-87) 135/86  SpO2:  [96 %-98 %] 96 %  Body mass index is 22 74 kg/m²  Input and Output Summary (last 24 hours): Intake/Output Summary (Last 24 hours) at 18 1852  Last data filed at 18 1606   Gross per 24 hour   Intake             4000 ml   Output                0 ml   Net             4000 ml       Physical Exam:     Physical Exam   Constitutional: He is oriented to person, place, and time  He appears well-developed and well-nourished  No distress  HENT:   Head: Normocephalic and atraumatic  Mouth/Throat: Oropharynx is clear and moist    Eyes: Conjunctivae are normal  Right eye exhibits no discharge  Left eye exhibits no discharge  Neck: Neck supple  Cardiovascular: Normal rate and regular rhythm  Pulmonary/Chest: Effort normal and breath sounds normal  No respiratory distress  He has no wheezes  He has no rales  Abdominal: Soft  Bowel sounds are normal  He exhibits no distension  There is no tenderness  Musculoskeletal: He exhibits no edema  Left upper extremity atrophy - at baseline as per patient  Weakness noted of left hand compared to the right - chronic   Neurological: He is alert and oriented to person, place, and time  No cranial nerve deficit     Skin: He is not diaphoretic  Psychiatric: He has a normal mood and affect  Additional Data:     Labs:      Results from last 7 days  Lab Units 04/01/18  0656 03/31/18  0903   WBC Thousand/uL 3 40* 5 50   HEMOGLOBIN g/dL 10 9* 13 7*   HEMATOCRIT % 33 6* 41 2*   PLATELETS Thousands/uL 71* 135   LYMPHO PCT %  --  27   MONO PCT MAN %  --  6       Results from last 7 days  Lab Units 04/02/18  0441   SODIUM mmol/L 136   POTASSIUM mmol/L 4 1   CHLORIDE mmol/L 102   CO2 mmol/L 27   BUN mg/dL 3*   CREATININE mg/dL 0 80   CALCIUM mg/dL 7 6*   TOTAL PROTEIN g/dL 5 7*   BILIRUBIN TOTAL mg/dL 3 30*   ALK PHOS U/L 278*   ALT U/L 114*   AST U/L 183*   GLUCOSE RANDOM mg/dL 98           * I Have Reviewed All Lab Data Listed Above  * Additional Pertinent Lab Tests Reviewed: [facility] Admission Reviewed    Imaging:  Ct Abdomen Pelvis With Contrast    Result Date: 3/31/2018  Narrative: CT ABDOMEN AND PELVIS WITH IV CONTRAST INDICATION:   pancreatitis/abdominal pain  COMPARISON: 7/8/2011 TECHNIQUE:  CT examination of the abdomen and pelvis was performed  Axial, sagittal, and coronal 2D reformatted images were created from the source data and submitted for interpretation  Radiation dose length product (DLP) for this visit:  500 28 mGy-cm   This examination, like all CT scans performed in the [facility], was performed utilizing techniques to minimize radiation dose exposure, including the use of iterative  reconstruction and automated exposure control  IV Contrast:  100 mL of iohexol (OMNIPAQUE) Enteric Contrast:  Enteric contrast was not administered  FINDINGS: ABDOMEN LOWER CHEST:  No clinically significant abnormality identified in the visualized lower chest  LIVER/BILIARY TREE:  Enlarged fatty liver  Additional wedge-shaped hypodensities right hepatic lobe of uncertain etiology with infarctions difficult to exclude  GALLBLADDER:  No calcified gallstones  No pericholecystic inflammatory change   SPLEEN: Unremarkable  PANCREAS:  Pancreatic head associated inflammatory change and mild free fluid consistent with pancreatitis without cyst, pseudocyst or abscess  ADRENAL GLANDS:  Unremarkable  KIDNEYS/URETERS:  Right lower pole 6 mm nonobstructive calculus  STOMACH AND BOWEL:  Mild to moderate sliding-type hiatal hernia noted  Inflammatory change noted at the hepatic flexure and ascending colon likely reflecting free fluid tracking inferiorly from the pancreas  Duodenal wall thickening and inflammatory change also noted  APPENDIX:  No findings to suggest appendicitis  ABDOMINOPELVIC CAVITY:  No ascites or free intraperitoneal air  No lymphadenopathy  VESSELS:  Unremarkable for patient's age  PELVIS REPRODUCTIVE ORGANS:  Unremarkable for patient's age  URINARY BLADDER:  Unremarkable  ABDOMINAL WALL/INGUINAL REGIONS:  Unremarkable  OSSEOUS STRUCTURES:  No acute fracture or destructive osseous lesion  Impression: 1  Pancreatic head inflammatory change and mild free fluid consistent with acute pancreatitis without complication  Associated inflammatory change involving the hepatic flexure and ascending colon likely reflects dependent retroperitoneal fluid rather than intrinsic colonic disease  Additionally, duodenal wall thickening and inflammatory change is likely reactive  2   Hepatomegaly with prominent fatty infiltration and additional regions of wedge-shaped hypoattenuation along the gallbladder  Findings could reflect more focal fat (favored) with wedge-shaped infarction difficult to entirely exclude  Vascular structures appear patent  Further evaluation with MRI suggested for further characterization  3   Hiatal hernia  4   Right lower renal pole 6 mm nonobstructive calculus   Workstation performed: [id number]     Imaging Reports Reviewed by myself    Cultures:   Blood Culture: No results found for: BLOODCX  Urine Culture: No results found for: URINECX  Sputum Culture: No components found for: SPUTUMCX  Wound Culture: No results found for: WOUNDCULT    Last 24 Hours Medication List:     Current Facility-Administered Medications:  vitamin C 1,500 mg Oral Daily Kirti Revankar, DO    cyanocobalamin 1,000 mcg Oral Daily Kirti Revankar, DO    famotidine 20 mg Intravenous Q12H [facility] Kirti Revankar, DO    ferrous sulfate 325 mg Oral Daily With Breakfast Kirti Revankar, DO    folic acid 1 mg Oral Daily Crystal Galan MD    [START ON 4/3/2018] magnesium oxide 200 mg Oral Daily Kirti Revankar, DO    morphine injection 4 mg Intravenous Q4H PRN Kirti Revankar, DO    ondansetron 4 mg Intravenous Q6H PRN Kirti Revankar, DO    oxyCODONE 5 mg Oral Q4H PRN Kirti Revankar, DO    sodium chloride 150 mL/hr Intravenous Continuous Naun Alba PA-C Last Rate: 150 mL/hr (04/02/18 1606)   thiamine 100 mg Oral Daily Dick García MD    traMADol 50 mg Oral Q6H PRN Kirti Revankar, DO    verapamil 120 mg Oral After Tariq Mena MD         Today, Patient Was Seen By: Dilip Gómez DO    ** Please Note: Dragon 360 Dictation voice to text software may have been used in the creation of this document   **

## 2018-04-02 NOTE — CASE MANAGEMENT
Continued Stay Review    Date: 4/2/18    Vital Signs: /86 (BP Location: Right arm)   Pulse 89   Temp 100 °F (37 8 °C) (Tympanic)   Resp 20   Ht 6' (1 829 m)   Wt 76 1 kg (167 lb 11 2 oz)   SpO2 96%   BMI 22 74 kg/m²     Medications:   Scheduled Meds:   Current Facility-Administered Medications:  vitamin C 1,500 mg Oral Daily Kirti Revankar, DO    cyanocobalamin 1,000 mcg Oral Daily Kirti Revankar, DO    famotidine 20 mg Intravenous Q12H [facility] Kirti Revankar, DO    ferrous sulfate 325 mg Oral Daily With Breakfast Kirti Revankar, DO    folic acid 1 mg Oral Daily Dick Rodriguez MD    morphine injection 4 mg Intravenous Q4H PRN Kirti Revankar, DO    ondansetron 4 mg Intravenous Q6H PRN Kirti Revankar, DO    oxyCODONE 5 mg Oral Q4H PRN [facility] Revankar, DO    sodium chloride 250 mL/hr Intravenous Continuous Elaina Hastings MD Last Rate: 250 mL/hr (04/02/18 0434)   thiamine 100 mg Oral Daily Jackie Johnson MD    traMADol 50 mg Oral Q6H PRN [facility] Revankar, DO    verapamil 120 mg Oral After Dinner Dane Gabriel MD      Continuous Infusions:   sodium chloride 250 mL/hr Last Rate: 250 mL/hr (04/02/18 0434)     PRN Meds: IV morphine injection: USED X7/24HRS    Ondansetron; USED X1    oxyCODONE    traMADol    Abnormal Labs/Diagnostic Results: LIPASE 397  Calcium 8 3 - 10 1 mg/dL 7 6   L    AST 5 - 45 U/L 183   H    Comments:    Specimen collection should occur prior to Sulfasalazine administration due to the potential for falsely depressed results  ALT 12 - 78 U/L 114   H       Alkaline Phosphatase 46 - 116 U/L 278   H    Total Protein 6 4 - 8 2 g/dL 5 7   L    Albumin 3 5 - 5 0 g/dL 2 3   L    Total Bilirubin 0 20 - 1 00 mg/dL 3 30   H   Age/Sex: 51 y o  male   Assessment/Plan:   Acute pancreatitis without infection or necrosis   Assessment & Plan     Noted on CT abdomen/pelvis  Likely alcoholic hepatitis  Lipase level has trended down  Repeat level in the a m    Advance to full liquid diet after ultrasound today  Continue IV fluids  IV morphine, oxycodone IR as needed for pain control  Patient does admit to drinking 2-3 shots of cognac every day for the last 3-4 months     Abnormal LFTs   Assessment & Plan     Due to alcohol abuse  Right upper quadrant ultrasound for further evaluation  Repeat LFTs in the a m  Alcohol abuse   Assessment & Plan     Patient has been taking 2-3 shots of cognac every day for the last 2-4 months to help control his pain from occipital neurology  Patient advised to stop drinking alcohol completely  Continue thiamine, folic acid     Occipital neuralgia   Assessment & Plan     Chronic in nature for patient but has worsened over the last 3 months  Patient has a neurostimulator in place  Advised patient to follow up with neurologist and pain management doctor after discharge  Neurology consult while here for further management   Discharge Plan: TBD  Thank you,  [address] [place] [address] in the [employer] by Pedro Bird for 2017  Network Utilization Review Department  Phone: [phone]; Fax [phone]  ATTENTION: The Network Utilization Review Department is now centralized for our 7 Facilities  Make a note that we have a new phone and fax numbers for our Department  Please call with any questions or concerns to [phone] and carefully follow the prompts so that you are directed to the right person  All voicemails are confidential  Fax any determinations, approvals, denials, and requests for initial or continue stay review clinical to [phone]  Due to HIGH CALL volume, it would be easier if you could please send faxed requests to expedite your requests and in part, help us provide discharge notifications faster

## 2018-04-02 NOTE — PLAN OF CARE
DISCHARGE PLANNING     Discharge to home or other facility with appropriate resources Progressing        DISCHARGE PLANNING - CARE MANAGEMENT     Discharge to post-acute care or home with appropriate resources Progressing        GASTROINTESTINAL - ADULT     Minimal or absence of nausea and/or vomiting Progressing     Maintains or returns to baseline bowel function Progressing     Maintains adequate nutritional intake Progressing        Knowledge Deficit     Patient/family/caregiver demonstrates understanding of disease process, treatment plan, medications, and discharge instructions Progressing        Nutrition/Hydration-ADULT     Nutrient/Hydration intake appropriate for improving, restoring or maintaining nutritional needs Progressing        PAIN - ADULT     Verbalizes/displays adequate comfort level or baseline comfort level Progressing

## 2018-04-02 NOTE — CASE MANAGEMENT
Initial Clinical Review  Admission: Date/Time/Statement: 3/31/18 @ 1113   Orders Placed This Encounter   Procedures    Inpatient Admission (expected length of stay for this patient is greater than two midnights)     Standing Status:   Standing     Number of Occurrences:   1     Order Specific Question:   Admitting Physician     Answer:   Denver Lynch     Order Specific Question:   Level of Care     Answer:   Med Surg [16]     Order Specific Question:   Estimated length of stay     Answer:   More than 2 Midnights     Order Specific Question:   Certification     Answer:   I certify that inpatient services are medically necessary for this patient for a duration of greater than two midnights  See H&P and MD Progress Notes for additional information about the patient's course of treatment  ED: Date/Time/Mode of Arrival:   ED Arrival Information     Expected Arrival Acuity Means of Arrival Escorted By Service Admission Type    - 3/31/2018 08:49 Emergent Wheelchair Spouse General Medicine Emergency    Arrival Complaint    HEADACHE WITH VOMITING      Chief Complaint:   Chief Complaint   Patient presents with    Headache     headache ongoing x 1 month,has neurostimulator implant x 2 1/2 yrs for occipital neuralgia,vomiting and losing weight,can't see neurologist till April 17   History of Illness:   Patient presents for evaluation of headache  States history of trigeminal neuralgia  Left sided radiating straight to the from  Has nerve stimulator  States waiting to follow up with the device technician but having trouble because of insurance/workmen's compensation issue  States pain has been worse recently  Nauseas and dry heaving but not vomiting as he doesn't have a stomach secondary to cancer  Unable to take anything by mouth for a few days  States Tramadol prescribed is not helping     ED Vital Signs:   ED Triage Vitals   Temperature Pulse Respirations Blood Pressure SpO2   03/31/18 0857 03/31/18 0857 03/31/18 0857 03/31/18 0857 03/31/18 0857   98 2 °F (36 8 °C) (!) 128 18 (!) 178/110 97 %      Temp Source Heart Rate Source Patient Position - Orthostatic VS BP Location FiO2 (%)   03/31/18 0857 03/31/18 1216 03/31/18 0857 03/31/18 0857 --   Tympanic Monitor Lying Left arm       Pain Score       03/31/18 0857       Worst Possible Pain        Wt Readings from Last 1 Encounters:   03/31/18 76 1 kg (167 lb 11 2 oz)   Vital Signs (abnormal):   T 100 4  Abnormal Labs/Diagnostic Test Results:   HGB 13 7  CL 90 Co2 12 ANION GAP 25 GLUC 185 CA 8 2   ALK PHOS 334 ALB 2 8 TBILI 2 10 LIPASE 1231   ACETAMINOPHEN <2  SALICYLATE <3 0  CT A/P=1  Pancreatic head inflammatory change and mild free fluid consistent with acute pancreatitis without complication  Associated inflammatory change involving the hepatic flexure and ascending colon likely reflects dependent retroperitoneal fluid rather   than intrinsic colonic disease  Additionally, duodenal wall thickening and inflammatory change is likely reactive  2   Hepatomegaly with prominent fatty infiltration and additional regions of wedge-shaped hypoattenuation along the gallbladder  Findings could reflect more focal fat (favored) with wedge-shaped infarction difficult to entirely exclude  Vascular   structures appear patent  Further evaluation with MRI suggested for further characterization  3   Hiatal hernia  4   Right lower renal pole 6 mm nonobstructive calculus    ED Treatment:   Medication Administration from 03/31/2018 0849 to 03/31/2018 1157       Date/Time Order Dose Route Action Action by Comments     03/31/2018 1005 sodium chloride 0 9 % bolus 1,000 mL 0 mL Intravenous Stopped Sandra Guzman RN      03/31/2018 0911 sodium chloride 0 9 % bolus 1,000 mL 1,000 mL Intravenous New Bag Sandra Guzman RN      03/31/2018 7032 metoclopramide (REGLAN) injection 10 mg 10 mg Intravenous Given Sandra Guzman RN      03/31/2018 9201 ketorolac (TORADOL) injection 15 mg 15 mg Intravenous Given Randi Dawn RN      03/31/2018 1129 sodium chloride 0 9 % bolus 1,000 mL 0 mL Intravenous Stopped Randi Dawn RN      03/31/2018 1008 sodium chloride 0 9 % bolus 1,000 mL 1,000 mL Intravenous New Bag Randi Dawn RN      03/31/2018 1011 morphine (PF) 4 mg/mL injection 4 mg 4 mg Intravenous Given Randi Dawn RN      03/31/2018 1029 iohexol (OMNIPAQUE) 350 MG/ML injection (MULTI-DOSE) 100 mL 100 mL Intravenous Given Sixto Padilla      03/31/2018 1134 sodium chloride 0 9 % infusion 150 mL/hr Intravenous Juancho Del Toro RN       Past Medical/Surgical History: Active Ambulatory Problems     Diagnosis Date Noted    No Active Ambulatory Problems     Resolved Ambulatory Problems     Diagnosis Date Noted    No Resolved Ambulatory Problems     Past Medical History:   Diagnosis Date    Anemia     Cancer of stomach ([address] )     Hypertension     Insomnia     Occipital neuralgia     Other mechanical complication of implanted electronic neurostimulator of spinal cord electrode (lead), sequela     Syrinx of spinal cord (HCC)     Vitamin D deficiency    Admitting Diagnosis: Pancreatitis [K85 90]  Headache [R51]  Age/Sex: 51 y o  male  Assessment/Plan:   Acute pancreatitis without infection or necrosis   Assessment & Plan     Noted on CT abdomen/pelvis  Lipase level also elevated  Admit patient for further management  Keep patient NPO  Place patient on IV fluids  IV morphine as needed for pain control  Consult GI for further recommendation  Repeat lipase level in the a m    Patient does admit to drinking 2-3 shots of cognac every day for the last 3-4 months  Once abdominal pain and lipase level starts to improve, will slowly initiate him on a diet       Abnormal LFTs   Assessment & Plan     Patient noted to have elevated LFTs on lab work from December 2017 but appear worse today  Will check Tylenol, salicylate level, hepatitis panel  Repeat LFTs in the a m   CT abdomen/pelvis showed hepatomegaly with prominent fatty infiltration and wedge-shaped hypoattenuation along the gallbladder     GI consult as noted above       High anion gap metabolic acidosis   Assessment & Plan     Likely due to dehydration  patient does report decreased p o  Intake  IV fluids and repeat lab work later today to make sure the anion gap is improving       Hyponatremia   Assessment & Plan     Likely hypovolemic hyponatremia  IV fluids and repeat lab work later to avoid over correction       Essential hypertension   Assessment & Plan     Patient does report high blood pressure previously but not on any medications as it was attributed to his occipital neuralgia  Monitor blood pressures overnight  If remains persistently elevated, will start patient on antihypertensive       Alcohol abuse   Assessment & Plan     Patient has been taking 2-3 shots of cognac every day for the last 2-4 months to help control his pain from occipital neurology  Patient advised to stop drinking alcohol  Start patient on multivitamin, thiamine, folic acid       Occipital neuralgia   Assessment & Plan     Chronic in nature for patient but has worsened over the last 3 months     Advised patient to follow up with his neurologist and pain management doctor after discharge   Admission Orders:  MED SURG  VENODYNES  NPO  CONSULT GI  Scheduled Meds:   Current Facility-Administered Medications:  vitamin C 1,500 mg Oral Daily Kirti Revankar, DO    cyanocobalamin 1,000 mcg Oral Daily Kirti Revankar, DO    famotidine 20 mg Intravenous Q12H [address] Kirti Revankar, DO    enoxaparin 40mg SQ Daily      folic acid 1 mg Oral Daily Jesse Gale MD    morphine injection 4 mg Intravenous Q4H PRN Kirti Revankar, DO    ondansetron 4 mg Intravenous Q6H PRN Kirti Revankar, DO    sodium chloride 250 mL/hr Intravenous Continuous Cheryl Richards MD Last Rate: 250 mL/hr (04/02/18 0434)   thiamine 100 mg Oral Daily Jesse Gale MD traMADol 50 mg Oral Q6H PRN Kirti Revabiodunar, DO      Continuous Infusions:   sodium chloride 250 mL/hr Last Rate: 250 mL/hr      PRN Meds: morphine injection;used x2    ondansetron    traMADol  Thank you,  [facility] in the [facility] by Pedro Bird for 2017  Network Utilization Review Department  Phone: [phone]; Fax [phone]  ATTENTION: The Network Utilization Review Department is now centralized for our 7 Facilities  Make a note that we have a new phone and fax numbers for our Department  Please call with any questions or concerns to [phone] and carefully follow the prompts so that you are directed to the right person  All voicemails are confidential  Fax any determinations, approvals, denials, and requests for initial or continue stay review clinical to [phone]  Due to HIGH CALL volume, it would be easier if you could please send faxed requests to expedite your requests and in part, help us provide discharge notifications faster

## 2018-04-02 NOTE — CASE MANAGEMENT
Continued Stay Review  Date: 18  Vitals:   Temp (24hrs), Av 6 °F (37 6 °C), Min:98 1 °F (36 7 °C), Max:100 4 °F (38 °C)  HR:  [82-93] 82  Resp:  [18-20] 18  BP: (152-164)/(87-99) 152/90  SpO2:  [96 %-98 %] 97 %  Body mass index is 22 74 kg/m²  Medications:   Scheduled Meds:Current Facility-Administered Medications:  vitamin C 1,500 mg Oral Daily Kirti Revankar, DO     cyanocobalamin 1,000 mcg Oral Daily Kirti Revankar, DO     famotidine 20 mg Intravenous Q12H [address] Kirti Revankar, DO     ferrous sulfate 325 mg Oral Daily With Breakfast Kirti Revankar, DO     folic acid 1 mg Oral Daily Sophia Agrawal MD     morphine injection 4 mg Intravenous Q4H PRN Kirti Revankar, DO     ondansetron 4 mg Intravenous Q6H PRN Kirti Revankar, DO     sodium chloride 250 mL/hr Intravenous Continuous Gloria Morgan MD Last Rate: 250 mL/hr (18 1328)   thiamine 100 mg Oral Daily Sophia Agrawal MD     traMADol 50 mg Oral Q6H PRN Kirti Revankar, DO     PRN Meds:IV morphine injection; USED X6/24HRS    ondansetron    oxyCODONE    traMADol  Abnormal Labs/Diagnostic Results:   Osmolality, Ur 250 - 900 mmol/KG 1,009   H   LIPASE 524  CL 98 Co2 20 ANION GAP 14 GLUC 156 CA 7 1   ALKPHOS 266 TPROT 5 8 ALB 2 3 TBILI 3 50 MG 1 2 WBC 3 40 HGB 10 9 PLT 71   Age/Sex: 51 y o  male   Assessment/Plan:   Acute pancreatitis without infection or necrosis   Assessment & Plan     Noted on CT abdomen/pelvis  Likely alcoholic hepatitis  Lipase level has trended down  Repeat level in the a m  Started on clear liquid diet  Continue IV fluids  IV morphine as needed for pain control  Patient does admit to drinking 2-3 shots of cognac every day for the last 3-4 months       Abnormal LFTs   Assessment & Plan     Patient noted to have elevated LFTs on lab work from 2017 but appear worse during this admission  Likely due to alcohol abuse     Right upper quadrant ultrasound for further evaluation  Tylenol, salicylate level low  hepatitis panel pending  AST, ALT, alk-phos level has trended down although total bilirubin has trended up  Repeat LFTs in the a m   CT abdomen/pelvis showed hepatomegaly with prominent fatty infiltration and wedge-shaped hypoattenuation along the gallbladder        High anion gap metabolic acidosis   Assessment & Plan     Likely due to dehydration  patient does report decreased p o  Intake  Had resolved but trended up again on lab work today  Continue IV fluids and repeat lab work tomorrow to make sure the anion gap is improving     Hyponatremia   Assessment & Plan     Likely hypovolemic hyponatremia  Sodium level trending up  Continue IV fluids       Essential hypertension   Assessment & Plan     Patient does report high blood pressure previously but not on any medications as it was attributed to pain from his occipital neuralgia  Monitor blood pressures off of antihypertensives for now     Alcohol abuse   Assessment & Plan     Patient has been taking 2-3 shots of cognac every day for the last 2-4 months to help control his pain from occipital neurology  Patient advised to stop drinking alcohol completely  Continue thiamine, folic acid     Occipital neuralgia   Assessment & Plan     Chronic in nature for patient but has worsened over the last 3 months    Patient has a neurostimulator in  Advised patient to follow up with his neurologist and pain management doctor after discharge  Will consult Neurology while here for further management   Discharge Plan: TBD

## 2018-04-02 NOTE — PROGRESS NOTES
Progress Note - Nolan Blue 51 y o  male MRN: [id number]    Unit/Bed#: [address] [place] [address] Encounter: [id number]        Subjective:   Patient reports feeling well, he says he only has some mild soreness in the abdomen in generally much less pain any was having before  Denies any subjective fevers or chills, no nausea or vomiting, complains of occasional gagging but attributes this to his occipital neuralgia  Objective:     Vitals: Blood pressure 134/85, pulse 103, temperature 98 8 °F (37 1 °C), temperature source Tympanic, resp  rate 20, height 6' (1 829 m), weight 76 1 kg (167 lb 11 2 oz), SpO2 98 %  ,Body mass index is 22 74 kg/m²  Intake/Output Summary (Last 24 hours) at 04/02/18 0902  Last data filed at 04/02/18 0434   Gross per 24 hour   Intake             3050 ml   Output                0 ml   Net             3050 ml       Physical Exam:   General appearance: alert, appears stated age and cooperative  Lungs: clear to auscultation bilaterally, no labored breathing/accessory muscle use  Heart: regular rate and rhythm, S1, S2 normal, no murmur, click, rub or gallop  Abdomen: soft, minimal epigastric tenderness with no guarding; bowel sounds normal; no masses,  no organomegaly  Extremities: no edema    Invasive Devices     Peripheral Intravenous Line            Peripheral IV 03/31/18 Left Antecubital 1 day                Lab, Imaging and other studies: I have personally reviewed pertinent reports      Admission on 03/31/2018   Component Date Value    WBC 03/31/2018 5 50     RBC 03/31/2018 4 49*    Hemoglobin 03/31/2018 13 7*    Hematocrit 03/31/2018 41 2*    MCV 03/31/2018 92     MCH 03/31/2018 30 6     MCHC 03/31/2018 33 3     RDW 03/31/2018 21 3*    MPV 03/31/2018 8 8*    Platelets 54/55/3905 135     nRBC 03/31/2018 0     Sodium 03/31/2018 127*    Potassium 03/31/2018 4 7     Chloride 03/31/2018 90*    CO2 03/31/2018 12*    Anion Gap 03/31/2018 25*    BUN 03/31/2018 13     Creatinine 03/31/2018 0 86     Glucose 03/31/2018 185*    Calcium 03/31/2018 8 2*    AST 03/31/2018 472*    ALT 03/31/2018 207*    Alkaline Phosphatase 03/31/2018 334*    Total Protein 03/31/2018 6 9     Albumin 03/31/2018 2 8*    Total Bilirubin 03/31/2018 2 10*    eGFR 03/31/2018 102     Lipase 03/31/2018 1231*    Segmented % 03/31/2018 64     Bands % 03/31/2018 3     Lymphocytes % 03/31/2018 27     Monocytes % 03/31/2018 6     Neutrophils Absolute 03/31/2018 3 69     Lymphocytes Absolute 03/31/2018 1 49     Monocytes Absolute 03/31/2018 0 33     Total Counted 03/31/2018 100     Platelet Estimate 26/01/7595 Borderline*    MRSA Culture Only 03/31/2018 No Methicillin Resistant Staphlyococcus aureus (MRSA) isolated     Sodium 03/31/2018 127*    Osmolality, Ur 04/01/2018 1009*    Osmolality Serum 03/31/2018 283     Sodium, Ur 04/01/2018 87     Sodium 03/31/2018 133*    Potassium 03/31/2018 4 1     Chloride 03/31/2018 99*    CO2 03/31/2018 28     Anion Gap 03/31/2018 6     BUN 03/31/2018 12     Creatinine 03/31/2018 0 93     Glucose 03/31/2018 180*    Calcium 03/31/2018 7 0*    eGFR 03/31/2018 96     Acetaminophen Level 80/32/6564 <2*    Salicylate Lvl 99/91/6465 <3 0*    Lipase 04/01/2018 524*    Sodium 04/01/2018 132*    Potassium 04/01/2018 3 7     Chloride 04/01/2018 98*    CO2 04/01/2018 20*    Anion Gap 04/01/2018 14*    BUN 04/01/2018 5     Creatinine 04/01/2018 0 81     Glucose 04/01/2018 156*    Calcium 04/01/2018 7 1*    AST 04/01/2018 256*    ALT 04/01/2018 117*    Alkaline Phosphatase 04/01/2018 266*    Total Protein 04/01/2018 5 8*    Albumin 04/01/2018 2 3*    Total Bilirubin 04/01/2018 3 50*    eGFR 04/01/2018 104     Magnesium 04/01/2018 1 2*    WBC 04/01/2018 3 40*    RBC 04/01/2018 3 56*    Hemoglobin 04/01/2018 10 9*    Hematocrit 04/01/2018 33 6*    MCV 04/01/2018 94     MCH 04/01/2018 30 8     MCHC 04/01/2018 32 6     RDW 04/01/2018 20 2*    Platelets 12/73/5952 71*    MPV 04/01/2018 8 5*    Hepatitis B Surface Ag 04/01/2018 Non-reactive     Hep A IgM 04/01/2018 Non-reactive     Hepatitis C Ab 04/01/2018 Non-reactive     Hep B C IgM 04/01/2018 Non-reactive     Platelet Estimate 29/18/0059 Decreased*    Hepatitis B Surface Ag 04/01/2018 Non-reactive     Hepatitis C Ab 04/01/2018 Non-reactive     Hep B C IgM 04/01/2018 Non-reactive     Hep B Core Total Ab 04/01/2018 Non-reactive     Sodium 04/02/2018 136     Potassium 04/02/2018 4 1     Chloride 04/02/2018 102     CO2 04/02/2018 27     Anion Gap 04/02/2018 7     BUN 04/02/2018 3*    Creatinine 04/02/2018 0 80     Glucose 04/02/2018 98     Calcium 04/02/2018 7 6*    AST 04/02/2018 183*    ALT 04/02/2018 114*    Alkaline Phosphatase 04/02/2018 278*    Total Protein 04/02/2018 5 7*    Albumin 04/02/2018 2 3*    Total Bilirubin 04/02/2018 3 30*    eGFR 04/02/2018 105     Lipase 04/02/2018 397*    Magnesium 04/02/2018 1 7        Results for Bruce Shah (MRN [id number]) as of 4/2/2018 09:02   Ref   Range 3/31/2018 18:01 3/31/2018 23:26 4/1/2018 00:47 4/1/2018 06:56 4/1/2018 11:16 4/2/2018 04:41   eGFR Latest Units: ml/min/1 73sq m  96  104  105   Sodium Latest Ref Range: 136 - 145 mmol/L 127 (L) 133 (L)  132 (L)  136   Potassium Latest Ref Range: 3 5 - 5 3 mmol/L  4 1  3 7  4 1   Chloride Latest Ref Range: 100 - 108 mmol/L  99 (L)  98 (L)  102   CO2 Latest Ref Range: 21 - 32 mmol/L  28  20 (L)  27   Anion Gap Latest Ref Range: 4 - 13 mmol/L  6  14 (H)  7   BUN Latest Ref Range: 5 - 25 mg/dL  12  5  3 (L)   Creatinine Latest Ref Range: 0 60 - 1 30 mg/dL  0 93  0 81  0 80   Glucose Latest Ref Range: 65 - 140 mg/dL  180 (H)  156 (H)  98   Calcium Latest Ref Range: 8 3 - 10 1 mg/dL  7 0 (L)  7 1 (L)  7 6 (L)   AST Latest Ref Range: 5 - 45 U/L    256 (H)  183 (H)   ALT Latest Ref Range: 12 - 78 U/L    117 (H)  114 (H)   Alkaline Phosphatase Latest Ref Range: 46 - 116 U/L    266 (H) 278 (H)   Total Protein Latest Ref Range: 6 4 - 8 2 g/dL    5 8 (L)  5 7 (L)   Albumin Latest Ref Range: 3 5 - 5 0 g/dL    2 3 (L)  2 3 (L)   Total Bilirubin Latest Ref Range: 0 20 - 1 00 mg/dL    3 50 (H)  3 30 (H)   Magnesium Latest Ref Range: 1 6 - 2 6 mg/dL    1 2 (L)  1 7   Lipase Latest Ref Range: 73 - 393 u/L    524 (H)  397 (H)   WBC Latest Ref Range: 4 80 - 10 80 Thousand/uL    3 40 (L)     RBC Latest Ref Range: 4 70 - 6 10 Million/uL    3 56 (L)     Hemoglobin Latest Ref Range: 14 0 - 18 0 g/dL    10 9 (L)     Hematocrit Latest Ref Range: 42 0 - 52 0 %    33 6 (L)     MCV Latest Ref Range: 82 - 98 fL    94     MCH Latest Ref Range: 27 0 - 31 0 pg    30 8     MCHC Latest Ref Range: 31 4 - 37 4 g/dL    32 6     RDW Latest Ref Range: 11 6 - 15 1 %    20 2 (H)     Platelets Latest Ref Range: 130 - 400 Thousands/uL    71 (L)     MPV Latest Ref Range: 8 9 - 12 7 fL    8 5 (L)     Platelet Estimate Latest Ref Range: Adequate     Decreased (A)     ACETAMINOPHEN LEVEL Latest Ref Range: 10 - 30 ug/mL  <2 (L)       SALICYLATE LEVEL Latest Ref Range: 3 - 20 mg/dL  <3 0 (L)       HEPATITIS A IGM ANTIBODY Latest Ref Range: Non-reactive, Equivocal-Suggest Recollect     Non-reactive     HEPATITIS B SURFACE ANTIGEN Latest Ref Range: Non-reactive, NonReactive - Confirmed     Non-reactive Non-reactive    HEPATITIS B CORE TOTAL ANTIBODY Latest Ref Range: Non-reactive      Non-reactive    HEPATITIS B CORE IGM ANTIBODY Latest Ref Range: Non-reactive     Non-reactive Non-reactive    HEPATITIS C ANTIBODY Latest Ref Range: Non-reactive     Non-reactive Non-reactive    Osmolality, Ur Latest Ref Range: 250 - 900 mmol/KG   1,009 (H)      SODIUM URINE Unknown   87        Assessment/Plan:    1  Acute pancreatitis with elevated LFTs, appears most likely secondary to alcohol abuse but rule out any biliary obstructive process    He appears to be clinically improving at this time IV fluids and conservative dietary management and his lipase has normalized    - continue aggressive IV hydration  - follow-up on right upper quadrant ultrasound which has been ordered for this morning  - may advance to full liquid diet after the ultrasound, and then to low-fat diet if patient continues to do well  - alcohol cessation  - continue to follow LFTs

## 2018-04-03 PROBLEM — D61.818 PANCYTOPENIA (HCC): Status: ACTIVE | Noted: 2018-04-03

## 2018-04-03 LAB
ALBUMIN SERPL BCP-MCNC: 2.2 G/DL (ref 3.5–5)
ALP SERPL-CCNC: 257 U/L (ref 46–116)
ALT SERPL W P-5'-P-CCNC: 96 U/L (ref 12–78)
ANION GAP SERPL CALCULATED.3IONS-SCNC: 9 MMOL/L (ref 4–13)
ANISOCYTOSIS BLD QL SMEAR: PRESENT
AST SERPL W P-5'-P-CCNC: 128 U/L (ref 5–45)
BILIRUB SERPL-MCNC: 2.9 MG/DL (ref 0.2–1)
BUN SERPL-MCNC: 3 MG/DL (ref 5–25)
CALCIUM SERPL-MCNC: 7.8 MG/DL (ref 8.3–10.1)
CHLORIDE SERPL-SCNC: 103 MMOL/L (ref 100–108)
CHOLEST SERPL-MCNC: 134 MG/DL (ref 50–200)
CO2 SERPL-SCNC: 25 MMOL/L (ref 21–32)
CREAT SERPL-MCNC: 0.75 MG/DL (ref 0.6–1.3)
ERYTHROCYTE [DISTWIDTH] IN BLOOD BY AUTOMATED COUNT: 19.8 % (ref 11.6–15.1)
GFR SERPL CREATININE-BSD FRML MDRD: 108 ML/MIN/1.73SQ M
GLUCOSE SERPL-MCNC: 95 MG/DL (ref 65–140)
HCT VFR BLD AUTO: 28.7 % (ref 42–52)
HDLC SERPL-MCNC: 21 MG/DL (ref 40–60)
HGB BLD-MCNC: 9.4 G/DL (ref 14–18)
LDLC SERPL CALC-MCNC: 102 MG/DL (ref 0–100)
LIPASE SERPL-CCNC: 436 U/L (ref 73–393)
MAGNESIUM SERPL-MCNC: 1.7 MG/DL (ref 1.6–2.6)
MCH RBC QN AUTO: 31.4 PG (ref 27–31)
MCHC RBC AUTO-ENTMCNC: 32.7 G/DL (ref 31.4–37.4)
MCV RBC AUTO: 96 FL (ref 82–98)
PLATELET # BLD AUTO: 83 THOUSANDS/UL (ref 130–400)
PLATELET BLD QL SMEAR: ABNORMAL
PMV BLD AUTO: 8.1 FL (ref 8.9–12.7)
POTASSIUM SERPL-SCNC: 3.6 MMOL/L (ref 3.5–5.3)
PROT SERPL-MCNC: 5.8 G/DL (ref 6.4–8.2)
RBC # BLD AUTO: 2.99 MILLION/UL (ref 4.7–6.1)
SODIUM SERPL-SCNC: 137 MMOL/L (ref 136–145)
TRIGL SERPL-MCNC: 57 MG/DL
WBC # BLD AUTO: 2.7 THOUSAND/UL (ref 4.8–10.8)

## 2018-04-03 PROCEDURE — 85027 COMPLETE CBC AUTOMATED: CPT | Performed by: FAMILY MEDICINE

## 2018-04-03 PROCEDURE — 83735 ASSAY OF MAGNESIUM: CPT | Performed by: FAMILY MEDICINE

## 2018-04-03 PROCEDURE — 80053 COMPREHEN METABOLIC PANEL: CPT | Performed by: FAMILY MEDICINE

## 2018-04-03 PROCEDURE — 80061 LIPID PANEL: CPT | Performed by: FAMILY MEDICINE

## 2018-04-03 PROCEDURE — 83690 ASSAY OF LIPASE: CPT | Performed by: FAMILY MEDICINE

## 2018-04-03 PROCEDURE — 99232 SBSQ HOSP IP/OBS MODERATE 35: CPT | Performed by: INTERNAL MEDICINE

## 2018-04-03 RX ORDER — FOLIC ACID 1 MG/1
1 TABLET ORAL DAILY
Status: DISCONTINUED | OUTPATIENT
Start: 2018-04-04 | End: 2018-04-06 | Stop reason: HOSPADM

## 2018-04-03 RX ORDER — LORAZEPAM 2 MG/ML
1 INJECTION INTRAMUSCULAR EVERY 4 HOURS PRN
Status: DISCONTINUED | OUTPATIENT
Start: 2018-04-03 | End: 2018-04-03

## 2018-04-03 RX ORDER — LORAZEPAM 2 MG/ML
2 INJECTION INTRAMUSCULAR EVERY 4 HOURS PRN
Status: DISCONTINUED | OUTPATIENT
Start: 2018-04-03 | End: 2018-04-06 | Stop reason: HOSPADM

## 2018-04-03 RX ORDER — SODIUM CHLORIDE, SODIUM LACTATE, POTASSIUM CHLORIDE, CALCIUM CHLORIDE 600; 310; 30; 20 MG/100ML; MG/100ML; MG/100ML; MG/100ML
200 INJECTION, SOLUTION INTRAVENOUS CONTINUOUS
Status: DISCONTINUED | OUTPATIENT
Start: 2018-04-03 | End: 2018-04-06 | Stop reason: HOSPADM

## 2018-04-03 RX ORDER — CHLORDIAZEPOXIDE HYDROCHLORIDE 25 MG/1
25 CAPSULE, GELATIN COATED ORAL EVERY 8 HOURS SCHEDULED
Status: DISCONTINUED | OUTPATIENT
Start: 2018-04-03 | End: 2018-04-05

## 2018-04-03 RX ORDER — LORAZEPAM 2 MG/ML
1 INJECTION INTRAMUSCULAR ONCE
Status: COMPLETED | OUTPATIENT
Start: 2018-04-03 | End: 2018-04-03

## 2018-04-03 RX ADMIN — VERAPAMIL HYDROCHLORIDE 120 MG: 120 TABLET, FILM COATED, EXTENDED RELEASE ORAL at 17:14

## 2018-04-03 RX ADMIN — OXYCODONE HYDROCHLORIDE 5 MG: 5 TABLET ORAL at 10:31

## 2018-04-03 RX ADMIN — SODIUM CHLORIDE, SODIUM LACTATE, POTASSIUM CHLORIDE, AND CALCIUM CHLORIDE 200 ML/HR: .6; .31; .03; .02 INJECTION, SOLUTION INTRAVENOUS at 23:00

## 2018-04-03 RX ADMIN — FAMOTIDINE 20 MG: 10 INJECTION, SOLUTION INTRAVENOUS at 08:53

## 2018-04-03 RX ADMIN — FOLIC ACID 1 MG: 1 TABLET ORAL at 08:58

## 2018-04-03 RX ADMIN — SODIUM CHLORIDE 150 ML/HR: 0.9 INJECTION, SOLUTION INTRAVENOUS at 06:31

## 2018-04-03 RX ADMIN — CHLORDIAZEPOXIDE HYDROCHLORIDE 25 MG: 25 CAPSULE ORAL at 17:14

## 2018-04-03 RX ADMIN — LORAZEPAM 1 MG: 2 INJECTION INTRAMUSCULAR; INTRAVENOUS at 17:14

## 2018-04-03 RX ADMIN — MORPHINE SULFATE 4 MG: 4 INJECTION INTRAVENOUS at 06:30

## 2018-04-03 RX ADMIN — LORAZEPAM 1 MG: 2 INJECTION INTRAMUSCULAR; INTRAVENOUS at 18:00

## 2018-04-03 RX ADMIN — OXYCODONE HYDROCHLORIDE 5 MG: 5 TABLET ORAL at 14:58

## 2018-04-03 RX ADMIN — CYANOCOBALAMIN TAB 500 MCG 1000 MCG: 500 TAB at 08:58

## 2018-04-03 RX ADMIN — Medication 100 MG: at 08:58

## 2018-04-03 RX ADMIN — FAMOTIDINE 20 MG: 10 INJECTION, SOLUTION INTRAVENOUS at 21:36

## 2018-04-03 RX ADMIN — SODIUM CHLORIDE, SODIUM LACTATE, POTASSIUM CHLORIDE, AND CALCIUM CHLORIDE 200 ML/HR: .6; .31; .03; .02 INJECTION, SOLUTION INTRAVENOUS at 12:35

## 2018-04-03 RX ADMIN — Medication 200 MG: at 08:57

## 2018-04-03 RX ADMIN — CHLORDIAZEPOXIDE HYDROCHLORIDE 25 MG: 25 CAPSULE ORAL at 21:37

## 2018-04-03 RX ADMIN — OXYCODONE HYDROCHLORIDE AND ACETAMINOPHEN 1500 MG: 500 TABLET ORAL at 08:58

## 2018-04-03 RX ADMIN — Medication 325 MG: at 08:41

## 2018-04-03 RX ADMIN — MORPHINE SULFATE 4 MG: 4 INJECTION INTRAVENOUS at 00:26

## 2018-04-03 NOTE — CASE MANAGEMENT
Continued Stay Review  Date: 4/3/18  Vital Signs: /85 (BP Location: Right arm)   Pulse 65   Temp 98 3 °F (36 8 °C) (Tympanic)   Resp 16   Ht 6' (1 829 m)   Wt 76 1 kg (167 lb 11 2 oz)   SpO2 98%   BMI 22 74 kg/m²   Medications:   Scheduled Meds:   Current Facility-Administered Medications:  vitamin C 1,500 mg Oral Daily Kirti Revankar, DO    cyanocobalamin 1,000 mcg Oral Daily Kirti Revankar, DO    famotidine 20 mg Intravenous Q12H [facility] Kirti Revankar, DO    ferrous sulfate 325 mg Oral Daily With Breakfast Kirti Revankar, DO    folic acid 1 mg Oral Daily Joellen Gong MD    magnesium oxide 200 mg Oral Daily Kirti Revankar, DO    morphine injection 4 mg Intravenous Q4H PRN Kirti Revankar, DO    ondansetron 4 mg Intravenous Q6H PRN [facility] Revankar, DO    oxyCODONE 5 mg Oral Q4H PRN [facility] Revankar, DO    sodium chloride 150 mL/hr Intravenous Continuous Naun Alba PA-C Last Rate: 150 mL/hr (04/03/18 0631)   thiamine 100 mg Oral Daily Joellen Gong MD    traMADol 50 mg Oral Q6H PRN [facility] Revankar, DO    verapamil 120 mg Oral After Dinner Monika Alvarez MD    Continuous Infusions:   sodium chloride 150 mL/hr Last Rate: 150 mL/hr (04/03/18 0631)   PRN Meds: morphine injection    ondansetron    oxyCODONE    traMADol  Abnormal Labs/Diagnostic Results:   CA 7 6   ALK PHOS 278 TPROT 5 7 ALB 2 3 TBILI 3 30 LIPASE 397  Age/Sex: 51 y o  male   Assessment/Plan:   1  Acute pancreatitis with elevated LFTs:  likely secondary to alcohol abuse  Although patient with decreased LFTs, lipase has increased from 397 to 436 and total bilirubin remains elevated  RUQ ultrasound shows no evidence of biliary obstructive process (CBD 7mm) in regards to pancreatitis will continue aggressive hydration and trend lipase  - continue IV hydration with lactated ringers at 200cc/hr  - follow lipase  - Counseled alcohol cessation    PER NEURO  Assessment/Plan:   1  Intractable migraines  2   H/o occipital Neuralgia  3  Paresthesia   4  H/o cervical syrinx  5  H/o cervical disc disease   Patient likely had occipital neuralgia at first and cervicogenic headaches from disc disease however now has component of migraine features  After lengthy discussion, it seems that he has not been tried on verapamil  Will use 120mg ER once daily for prevention  Discussed that it can at times cause constipation so should take magnesium oxide 400mg along with it  Start carbamazepine 100mg bid after one week  This can help with paresthesia which can a sx of syrinx  Discussed that he should consider botox injections for headache  Since he has a lot of sensitivity to various medications, botox would be good option and has shown to be effective  Will follow up with him in clinic in 4-6 weeks  Discussed plan at length with patient, wife and primary team     Discharge Plan: TBD  Thank you,  [facility] in the [employer] for 2017  Network Utilization Review Department  Phone: [phone]; Fax [phone]  ATTENTION: The Network Utilization Review Department is now centralized for our 7 Facilities  Make a note that we have a new phone and fax numbers for our Department  Please call with any questions or concerns to [phone] and carefully follow the prompts so that you are directed to the right person  All voicemails are confidential  Fax any determinations, approvals, denials, and requests for initial or continue stay review clinical to [phone]  Due to HIGH CALL volume, it would be easier if you could please send faxed requests to expedite your requests and in part, help us provide discharge notifications faster

## 2018-04-03 NOTE — ASSESSMENT & PLAN NOTE
Patient noted to have progressively worsening white count, platelet count and hemoglobin  Most likely secondary to dilution from IV hydration  Monitor counts

## 2018-04-03 NOTE — PROGRESS NOTES
Progress Note - Nolan Blue 51 y o  male MRN: [id number]    Unit/Bed#: [address] [place] [address] Encounter: [id number]        Subjective:   Patient reports feeling well with no abdominal pain  Patient had 2 normal soft non-bilious, non-bloody bowel movements today  Denies nausea and vomiting overnight and tolerated diet well  Patient wants to advance diet  Denies fevers, chills or nausea with occasional gagging related to his occipital neuralgia  Objective:     Vitals: Blood pressure 130/85, pulse 65, temperature 98 3 °F (36 8 °C), temperature source Tympanic, resp  rate 16, height 6' (1 829 m), weight 76 1 kg (167 lb 11 2 oz), SpO2 98 %  ,Body mass index is 22 74 kg/m²  Intake/Output Summary (Last 24 hours) at 04/03/18 0945  Last data filed at 04/03/18 0631   Gross per 24 hour   Intake           3162 5 ml   Output                0 ml   Net           3162 5 ml       Physical Exam:   General appearance: alert, appears stated age and cooperative  Lungs: clear to auscultation bilaterally, no labored breathing/accessory muscle use  Heart: regular rate and rhythm, S1, S2 normal, no murmur, click, rub or gallop  Abdomen: soft, non-tender; bowel sounds normal; no masses,  no organomegaly, well-healed midline scar from prior gastrectomy surgery  Extremities: no edema    Invasive Devices     Peripheral Intravenous Line            Peripheral IV 03/31/18 Left Antecubital 3 days                Lab, Imaging and other studies: I have personally reviewed pertinent reports      Admission on 03/31/2018   Component Date Value    WBC 03/31/2018 5 50     RBC 03/31/2018 4 49*    Hemoglobin 03/31/2018 13 7*    Hematocrit 03/31/2018 41 2*    MCV 03/31/2018 92     MCH 03/31/2018 30 6     MCHC 03/31/2018 33 3     RDW 03/31/2018 21 3*    MPV 03/31/2018 8 8*    Platelets 29/14/9016 135     nRBC 03/31/2018 0     Sodium 03/31/2018 127*    Potassium 03/31/2018 4 7     Chloride 03/31/2018 90*    CO2 03/31/2018 12*    Anion Gap 03/31/2018 25*    BUN 03/31/2018 13     Creatinine 03/31/2018 0 86     Glucose 03/31/2018 185*    Calcium 03/31/2018 8 2*    AST 03/31/2018 472*    ALT 03/31/2018 207*    Alkaline Phosphatase 03/31/2018 334*    Total Protein 03/31/2018 6 9     Albumin 03/31/2018 2 8*    Total Bilirubin 03/31/2018 2 10*    eGFR 03/31/2018 102     Lipase 03/31/2018 1231*    Segmented % 03/31/2018 64     Bands % 03/31/2018 3     Lymphocytes % 03/31/2018 27     Monocytes % 03/31/2018 6     Neutrophils Absolute 03/31/2018 3 69     Lymphocytes Absolute 03/31/2018 1 49     Monocytes Absolute 03/31/2018 0 33     Total Counted 03/31/2018 100     Platelet Estimate 16/14/1218 Borderline*    MRSA Culture Only 03/31/2018 No Methicillin Resistant Staphlyococcus aureus (MRSA) isolated     Sodium 03/31/2018 127*    Osmolality, Ur 04/01/2018 1009*    Osmolality Serum 03/31/2018 283     Sodium, Ur 04/01/2018 87     Sodium 03/31/2018 133*    Potassium 03/31/2018 4 1     Chloride 03/31/2018 99*    CO2 03/31/2018 28     Anion Gap 03/31/2018 6     BUN 03/31/2018 12     Creatinine 03/31/2018 0 93     Glucose 03/31/2018 180*    Calcium 03/31/2018 7 0*    eGFR 03/31/2018 96     Acetaminophen Level 28/54/8404 <2*    Salicylate Lvl 90/45/5852 <3 0*    Lipase 04/01/2018 524*    Sodium 04/01/2018 132*    Potassium 04/01/2018 3 7     Chloride 04/01/2018 98*    CO2 04/01/2018 20*    Anion Gap 04/01/2018 14*    BUN 04/01/2018 5     Creatinine 04/01/2018 0 81     Glucose 04/01/2018 156*    Calcium 04/01/2018 7 1*    AST 04/01/2018 256*    ALT 04/01/2018 117*    Alkaline Phosphatase 04/01/2018 266*    Total Protein 04/01/2018 5 8*    Albumin 04/01/2018 2 3*    Total Bilirubin 04/01/2018 3 50*    eGFR 04/01/2018 104     Magnesium 04/01/2018 1 2*    WBC 04/01/2018 3 40*    RBC 04/01/2018 3 56*    Hemoglobin 04/01/2018 10 9*    Hematocrit 04/01/2018 33 6*    MCV 04/01/2018 94     MCH 04/01/2018 30 8     MCHC 04/01/2018 32 6     RDW 04/01/2018 20 2*    Platelets 67/48/5343 71*    MPV 04/01/2018 8 5*    Hepatitis B Surface Ag 04/01/2018 Non-reactive     Hep A IgM 04/01/2018 Non-reactive     Hepatitis C Ab 04/01/2018 Non-reactive     Hep B C IgM 04/01/2018 Non-reactive     Platelet Estimate 94/07/4951 Decreased*    Hepatitis B Surface Ag 04/01/2018 Non-reactive     Hepatitis C Ab 04/01/2018 Non-reactive     Hep B C IgM 04/01/2018 Non-reactive     Hep B Core Total Ab 04/01/2018 Non-reactive     Sodium 04/02/2018 136     Potassium 04/02/2018 4 1     Chloride 04/02/2018 102     CO2 04/02/2018 27     Anion Gap 04/02/2018 7     BUN 04/02/2018 3*    Creatinine 04/02/2018 0 80     Glucose 04/02/2018 98     Calcium 04/02/2018 7 6*    AST 04/02/2018 183*    ALT 04/02/2018 114*    Alkaline Phosphatase 04/02/2018 278*    Total Protein 04/02/2018 5 7*    Albumin 04/02/2018 2 3*    Total Bilirubin 04/02/2018 3 30*    eGFR 04/02/2018 105     Lipase 04/02/2018 397*    Magnesium 04/02/2018 1 7     Sodium 04/03/2018 137     Potassium 04/03/2018 3 6     Chloride 04/03/2018 103     CO2 04/03/2018 25     Anion Gap 04/03/2018 9     BUN 04/03/2018 3*    Creatinine 04/03/2018 0 75     Glucose 04/03/2018 95     Calcium 04/03/2018 7 8*    AST 04/03/2018 128*    ALT 04/03/2018 96*    Alkaline Phosphatase 04/03/2018 257*    Total Protein 04/03/2018 5 8*    Albumin 04/03/2018 2 2*    Total Bilirubin 04/03/2018 2 90*    eGFR 04/03/2018 108     Lipase 04/03/2018 436*    Magnesium 04/03/2018 1 7     WBC 04/03/2018 2 70*    RBC 04/03/2018 2 99*    Hemoglobin 04/03/2018 9 4*    Hematocrit 04/03/2018 28 7*    MCV 04/03/2018 96     MCH 04/03/2018 31 4*    MCHC 04/03/2018 32 7     RDW 04/03/2018 19 8*    Platelets 19/12/5366 83*    MPV 04/03/2018 8 1*    Cholesterol 04/03/2018 134     Triglycerides 04/03/2018 57     HDL, Direct 04/03/2018 21*    LDL Calculated 04/03/2018 102*    Anisocytosis 04/03/2018 Present     Platelet Estimate 78/13/3947 Decreased*     US right upper quadrant with liver dopplers   Final Result      Moderate hepatomegaly and fatty liver change  There is no evidence of biliary ductal dilatation  There is a 1 cm nonobstructing right kidney lower pole calyceal stone  Workstation performed: [id number]         CT abdomen pelvis with contrast   Final Result   1  Pancreatic head inflammatory change and mild free fluid consistent with acute pancreatitis without complication  Associated inflammatory change involving the hepatic flexure and ascending colon likely reflects dependent retroperitoneal fluid rather    than intrinsic colonic disease  Additionally, duodenal wall thickening and inflammatory change is likely reactive  2   Hepatomegaly with prominent fatty infiltration and additional regions of wedge-shaped hypoattenuation along the gallbladder  Findings could reflect more focal fat (favored) with wedge-shaped infarction difficult to entirely exclude  Vascular    structures appear patent  Further evaluation with MRI suggested for further characterization  3   Hiatal hernia  4   Right lower renal pole 6 mm nonobstructive calculus  Workstation performed: [id number]               Principal Problem:    Acute pancreatitis without infection or necrosis  Active Problems:    Occipital neuralgia    Essential hypertension    Hyponatremia    Abnormal LFTs    Alcohol abuse    History of gastric cancer    Hypomagnesemia      Assessment/Plan:  51year old male with acute pancreatitis with acute pancreatitis still showing elevated lipase with increase from yesterday that still requires aggressive IVF hydration  Although patient claims subjectively improving he required pain control medication overnight, had low grade elevated temperature and has increased lipase  1  Acute pancreatitis with elevated LFTs:  likely secondary to alcohol abuse     Although patient claims and looks clinically improved with decreased LFTs, lipase has increased from 397 to 436 and total bilirubin remains elevated  Although, RUQ ultrasound shows no evidence of biliary obstructive process (CBD 7mm) in regards to pancreatitis will continue aggressive hydration and trend lipase    - continue IV hydration with lactated ringers at 200cc/hr  - follow lipase  - allow advanced diet to regular low-fat for now, but if patient continues to have pain at any time will place back on low fat, full liquids  - Counseled alcohol cessation  - upon discharge will need follow up LFTs in 1 week    Patient was seen by and plan was discussed with Dr Vanesa Goodrich
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
## 2018-04-03 NOTE — PROGRESS NOTES
Progress Note - Nolan Blue 1970, 51 y o  male MRN: [id number]    Unit/Bed#: [address] [place] [address] Encounter: [id number]    Primary Care Provider: Sari Estrada MD   Date and time admitted to hospital: 3/31/2018  8:55 AM        * Acute pancreatitis without infection or necrosis   Assessment & Plan    Noted on CT abdomen/pelvis  Likely alcoholic hepatitis  Abdominal ultrasound showed hepatomegaly with fatty liver without any stones or biliary ductal dilatation  Patient's triglyceride levels were normal  Lipase level has increased to 436 from 397 today   Continue IV fluids which were changed to LR at 200 milliliters per hr,  IV morphine, oxycodone IR as needed for pain control  Patient does admit to drinking 2-3 shots of cognac every day for the last 3-4 months  Diet will be advanced to low-fat diet and monitor course with repeat LFTs in a [facility] Patient also had a low-grade fever last night  Elevated LFTs   Assessment & Plan    Patient noted to have elevated LFTs on lab work from December 2017 but appear worse during this admission  Likely due to alcohol abuse  Right upper quadrant ultrasound showed hepatomegaly with fatty change without any ductal dilatation  Tylenol, salicylate level low  hepatitis panel nonreactive  LFTs are improving  Follow up repeat CMP in a    CT abdomen/pelvis showed hepatomegaly with prominent fatty infiltration and wedge-shaped hypoattenuation along the gallbladder  Occipital neuralgia   Assessment & Plan    Chronic in nature for patient but has worsened over the last 3 months  Patient has a neurostimulator in place  Likely also secondary to intractable migraine  Advised patient to follow up with neurologist and pain management doctor after discharge  Patient was started on verapamil  milligram p   O  daily and with some oxide 20 milligram p   O  daily as verapamil can cause constipation    Plan is to start patient on carbamazepine 100 mg b i d  1 week later        Essential hypertension   Assessment & Plan    Patient does report high blood pressure previously but not on any medications as it was attributed to pain from his occipital neuralgia  Blood pressures seem like they are improving  Monitor blood pressures off of antihypertensives for now        Hyponatremia   Assessment & Plan    Likely hypovolemic hyponatremia  Sodium level is normal with IV hydration        Alcohol abuse   Assessment & Plan    Patient has been taking 2-3 shots of cognac every day for the last 2-4 months to help control his pain from occipital neurologia  Patient will be started on alcohol withdrawal protocol with Ativan p r n , Librium 25 milligram p o  q 8 hours, thiamine 100 milligram p o  daily, folic acid 1 milligram p o  daily  History of gastric cancer   Assessment & Plan    Status post gastrojejunostomy and then complete gastrectomy due to recurrence of tumor at the margins   Continue outpatient follow-up        Hypomagnesemia   Assessment & Plan    Resolved with repletion        High anion gap metabolic acidosis-resolved as of 4/2/2018   Assessment & Plan    Likely due to dehydration  patient does report decreased p o  Intake  Resolved with IV fluids   Repeat lab work tomorrow              VTE Pharmacologic Prophylaxis:   Pharmacologic: Pharmacologic VTE Prophylaxis contraindicated due to Thrombocytopenia  Mechanical VTE Prophylaxis in Place: Yes    Patient Centered Rounds: I have performed bedside rounds with nursing staff today  Discussions with Specialists or Other Care Team Provider: Yes    Education and Discussions with Family / Patient:Yes    Time Spent for Care: 30 minutes  More than 50% of total time spent on counseling and coordination of care as described above      Current Length of Stay: 3 day(s)    Current Patient Status: Inpatient     Discharge Plan: Home    Code Status: Level 1 - Full Code      Subjective:   Patient denies any abdominal pain and is hungry and wants to eat  Patient is looking slightly shaky today      Objective:       Vitals:   Temp (24hrs), Av °F (37 2 °C), Min:98 3 °F (36 8 °C), Max:100 6 °F (38 1 °C)    HR:  [65-77] 77  Resp:  [16-20] 16  BP: (118-136)/(80-85) 136/83  SpO2:  [98 %-99 %] 98 %  Body mass index is 22 74 kg/m²  Input and Output Summary (last 24 hours): Intake/Output Summary (Last 24 hours) at 18 1701  Last data filed at 18 1235   Gross per 24 hour   Intake           3162 5 ml   Output                0 ml   Net           3162 5 ml       Physical Exam:     Physical Exam   Constitutional: No distress  HENT:   Head: Normocephalic and atraumatic  Nose: Nose normal    Mouth/Throat: Oropharynx is clear and moist    Eyes: Conjunctivae and EOM are normal  Pupils are equal, round, and reactive to light  Neck: Normal range of motion  Neck supple  No JVD present  Cardiovascular: Normal rate, regular rhythm and normal heart sounds  Exam reveals no gallop and no friction rub  No murmur heard  Pulmonary/Chest: Effort normal and breath sounds normal  No respiratory distress  He has no wheezes  He has no rales  He exhibits no tenderness  Abdominal: Soft  Bowel sounds are normal  He exhibits no distension  There is no tenderness  There is no rebound and no guarding  Musculoskeletal: He exhibits no edema  Neurological: He is alert  No cranial nerve deficit  Positive shakiness   Skin: Skin is warm and dry  No rash noted  Psychiatric: He has a normal mood and affect  Additional Data:     Labs:      Results from last 7 days  Lab Units 18  0638  18  0903   WBC Thousand/uL 2 70*  < > 5 50   HEMOGLOBIN g/dL 9 4*  < > 13 7*   HEMATOCRIT % 28 7*  < > 41 2*   PLATELETS Thousands/uL 83*  < > 135   LYMPHO PCT %  --   --  27   MONO PCT MAN %  --   --  6   < > = values in this interval not displayed      Results from last 7 days  Lab Units 18  0638   SODIUM mmol/L 137   POTASSIUM mmol/L 3  6   CHLORIDE mmol/L 103   CO2 mmol/L 25   BUN mg/dL 3*   CREATININE mg/dL 0 75   CALCIUM mg/dL 7 8*   TOTAL PROTEIN g/dL 5 8*   BILIRUBIN TOTAL mg/dL 2 90*   ALK PHOS U/L 257*   ALT U/L 96*   AST U/L 128*   GLUCOSE RANDOM mg/dL 95           * I Have Reviewed All Lab Data Listed Above  * Additional Pertinent Lab Tests Reviewed: Danielle 65 Admission Reviewed    Imaging:  Ct Abdomen Pelvis With Contrast    Result Date: 3/31/2018  Narrative: CT ABDOMEN AND PELVIS WITH IV CONTRAST INDICATION:   pancreatitis/abdominal pain  COMPARISON: 7/8/2011 TECHNIQUE:  CT examination of the abdomen and pelvis was performed  Axial, sagittal, and coronal 2D reformatted images were created from the source data and submitted for interpretation  Radiation dose length product (DLP) for this visit:  500 28 mGy-cm   This examination, like all CT scans performed in the [facility], was performed utilizing techniques to minimize radiation dose exposure, including the use of iterative  reconstruction and automated exposure control  IV Contrast:  100 mL of iohexol (OMNIPAQUE) Enteric Contrast:  Enteric contrast was not administered  FINDINGS: ABDOMEN LOWER CHEST:  No clinically significant abnormality identified in the visualized lower chest  LIVER/BILIARY TREE:  Enlarged fatty liver  Additional wedge-shaped hypodensities right hepatic lobe of uncertain etiology with infarctions difficult to exclude  GALLBLADDER:  No calcified gallstones  No pericholecystic inflammatory change  SPLEEN:  Unremarkable  PANCREAS:  Pancreatic head associated inflammatory change and mild free fluid consistent with pancreatitis without cyst, pseudocyst or abscess  ADRENAL GLANDS:  Unremarkable  KIDNEYS/URETERS:  Right lower pole 6 mm nonobstructive calculus  STOMACH AND BOWEL:  Mild to moderate sliding-type hiatal hernia noted    Inflammatory change noted at the hepatic flexure and ascending colon likely reflecting free fluid tracking inferiorly from the pancreas  Duodenal wall thickening and inflammatory change also noted  APPENDIX:  No findings to suggest appendicitis  ABDOMINOPELVIC CAVITY:  No ascites or free intraperitoneal air  No lymphadenopathy  VESSELS:  Unremarkable for patient's age  PELVIS REPRODUCTIVE ORGANS:  Unremarkable for patient's age  URINARY BLADDER:  Unremarkable  ABDOMINAL WALL/INGUINAL REGIONS:  Unremarkable  OSSEOUS STRUCTURES:  No acute fracture or destructive osseous lesion  Impression: 1  Pancreatic head inflammatory change and mild free fluid consistent with acute pancreatitis without complication  Associated inflammatory change involving the hepatic flexure and ascending colon likely reflects dependent retroperitoneal fluid rather than intrinsic colonic disease  Additionally, duodenal wall thickening and inflammatory change is likely reactive  2   Hepatomegaly with prominent fatty infiltration and additional regions of wedge-shaped hypoattenuation along the gallbladder  Findings could reflect more focal fat (favored) with wedge-shaped infarction difficult to entirely exclude  Vascular structures appear patent  Further evaluation with MRI suggested for further characterization  3   Hiatal hernia  4   Right lower renal pole 6 mm nonobstructive calculus  Workstation performed: [id number]     Us Right Upper Quadrant With Liver Dopplers    Result Date: 4/2/2018  Narrative: RIGHT UPPER QUADRANT ULTRASOUND WITH LIVER DOPPLER INDICATION:     elevated LFTs, evaluate for biliary pancreatiits  COMPARISON:  Abdomen and pelvic CT from 3/31/2018  TECHNIQUE:   Real-time ultrasound of the right upper quadrant was performed with a curvilinear transducer with both volumetric sweeps and still imaging techniques  FINDINGS: PANCREAS:  Portions of the pancreas are obscured by bowel gas  Visualized portions of the pancreas are unremarkable  AORTA AND IVC:  Visualized portions are normal for patient age   LIVER: Size: Moderately enlarged  The liver measures 21 cm in the midclavicular line  Contour:  Surface contour is smooth  Parenchyma: There is moderate diffuse increased echogenicity with smooth echotexture and acoustic beam attenuation  Most consistent with moderate hepatic steatosis  No evidence of suspicious mass  LIVER DOPPLER: The main portal vein and primary branch segments are patent and hepatopetal with normal spectral waveform  Hepatic veins are patent  Spectral waveforms within normal limits  Main hepatic artery appears normal size, patent with normal spectral waveform  BILIARY: Patient has undergone prior cholecystectomy  No intrahepatic biliary dilatation  CBD measures 7 mm  No choledocholithiasis  KIDNEY: Right kidney measures 12 cm  There is a 1 cm nonobstructing right kidney lower pole calyceal stone  There is also a tiny simple cyst in the right kidney lower pole ASCITES:   None  Impression: Moderate hepatomegaly and fatty liver change  There is no evidence of biliary ductal dilatation  There is a 1 cm nonobstructing right kidney lower pole calyceal stone   Workstation performed: [id number]     Imaging Reports Reviewed by myself    Cultures:   Blood Culture: No results found for: BLOODCX  Urine Culture: No results found for: URINECX  Sputum Culture: No components found for: SPUTUMCX  Wound Culture: No results found for: WOUNDCULT    Last 24 Hours Medication List:     Current Facility-Administered Medications:  vitamin C 1,500 mg Oral Daily Kirti Estevez, DO    chlordiazePOXIDE 25 mg Oral Q8H [facility] Beltran Jones MD    cyanocobalamin 1,000 mcg Oral Daily Kirti Estevez DO    famotidine 20 mg Intravenous Q12H [facility] Kirti Estevez, DO    ferrous sulfate 325 mg Oral Daily With Breakfast Kirti Estevez DO    [START ON 8/5/4772] folic acid 1 mg Oral Daily Beltran Jones MD    lactated ringers 200 mL/hr Intravenous Continuous Ulyess Staples Last Rate: 200 mL/hr (04/03/18 1235)   LORazepam 1 mg Intravenous Q4H PRN Natalia Paiz MD    magnesium oxide 200 mg Oral Daily Kirti Revankar, DO    morphine injection 4 mg Intravenous Q4H PRN Kirti Revankar, DO    [START ON 4/4/2018] multivitamin-minerals 1 tablet Oral Daily Diana Layton MD    ondansetron 4 mg Intravenous Q6H PRN Kirti Revankar, DO    oxyCODONE 5 mg Oral Q4H PRN Kirti Revankar, DO    thiamine 100 mg Oral Daily Dick Cronin MD    traMADol 50 mg Oral Q6H PRN Kirti Revankar, DO    verapamil 120 mg Oral After Génesis Mckenzie MD         Today, Patient Was Seen By: Natalia Paiz MD    ** Please Note: Dragon 360 Dictation voice to text software may have been used in the creation of this document   **

## 2018-04-04 LAB
ALBUMIN SERPL BCP-MCNC: 2.6 G/DL (ref 3.5–5)
ALP SERPL-CCNC: 259 U/L (ref 46–116)
ALT SERPL W P-5'-P-CCNC: 97 U/L (ref 12–78)
ANION GAP SERPL CALCULATED.3IONS-SCNC: 8 MMOL/L (ref 4–13)
AST SERPL W P-5'-P-CCNC: 111 U/L (ref 5–45)
BILIRUB SERPL-MCNC: 2 MG/DL (ref 0.2–1)
BUN SERPL-MCNC: 6 MG/DL (ref 5–25)
CALCIUM SERPL-MCNC: 8.5 MG/DL (ref 8.3–10.1)
CHLORIDE SERPL-SCNC: 101 MMOL/L (ref 100–108)
CO2 SERPL-SCNC: 27 MMOL/L (ref 21–32)
CREAT SERPL-MCNC: 0.79 MG/DL (ref 0.6–1.3)
ERYTHROCYTE [DISTWIDTH] IN BLOOD BY AUTOMATED COUNT: 19.8 % (ref 11.6–15.1)
GFR SERPL CREATININE-BSD FRML MDRD: 105 ML/MIN/1.73SQ M
GLUCOSE SERPL-MCNC: 98 MG/DL (ref 65–140)
HCT VFR BLD AUTO: 33.3 % (ref 42–52)
HGB BLD-MCNC: 10.7 G/DL (ref 14–18)
LIPASE SERPL-CCNC: 1090 U/L (ref 73–393)
MCH RBC QN AUTO: 31.1 PG (ref 27–31)
MCHC RBC AUTO-ENTMCNC: 32.3 G/DL (ref 31.4–37.4)
MCV RBC AUTO: 96 FL (ref 82–98)
PLATELET # BLD AUTO: 108 THOUSANDS/UL (ref 130–400)
PMV BLD AUTO: 8.8 FL (ref 8.9–12.7)
POTASSIUM SERPL-SCNC: 3.5 MMOL/L (ref 3.5–5.3)
PROT SERPL-MCNC: 6.7 G/DL (ref 6.4–8.2)
RBC # BLD AUTO: 3.46 MILLION/UL (ref 4.7–6.1)
SODIUM SERPL-SCNC: 136 MMOL/L (ref 136–145)
WBC # BLD AUTO: 3.5 THOUSAND/UL (ref 4.8–10.8)

## 2018-04-04 PROCEDURE — 83690 ASSAY OF LIPASE: CPT | Performed by: INTERNAL MEDICINE

## 2018-04-04 PROCEDURE — 85027 COMPLETE CBC AUTOMATED: CPT | Performed by: INTERNAL MEDICINE

## 2018-04-04 PROCEDURE — 80053 COMPREHEN METABOLIC PANEL: CPT | Performed by: INTERNAL MEDICINE

## 2018-04-04 PROCEDURE — 99232 SBSQ HOSP IP/OBS MODERATE 35: CPT | Performed by: INTERNAL MEDICINE

## 2018-04-04 RX ORDER — FAMOTIDINE 20 MG/1
20 TABLET, FILM COATED ORAL 2 TIMES DAILY
Status: DISCONTINUED | OUTPATIENT
Start: 2018-04-04 | End: 2018-04-06 | Stop reason: HOSPADM

## 2018-04-04 RX ADMIN — LORAZEPAM 2 MG: 2 INJECTION INTRAMUSCULAR; INTRAVENOUS at 03:00

## 2018-04-04 RX ADMIN — CHLORDIAZEPOXIDE HYDROCHLORIDE 25 MG: 25 CAPSULE ORAL at 13:58

## 2018-04-04 RX ADMIN — Medication 100 MG: at 08:17

## 2018-04-04 RX ADMIN — OXYCODONE HYDROCHLORIDE AND ACETAMINOPHEN 1500 MG: 500 TABLET ORAL at 08:17

## 2018-04-04 RX ADMIN — Medication 200 MG: at 08:18

## 2018-04-04 RX ADMIN — CYANOCOBALAMIN TAB 500 MCG 1000 MCG: 500 TAB at 08:17

## 2018-04-04 RX ADMIN — FAMOTIDINE 20 MG: 20 TABLET ORAL at 17:24

## 2018-04-04 RX ADMIN — FAMOTIDINE 20 MG: 10 INJECTION, SOLUTION INTRAVENOUS at 08:18

## 2018-04-04 RX ADMIN — SODIUM CHLORIDE, SODIUM LACTATE, POTASSIUM CHLORIDE, AND CALCIUM CHLORIDE 200 ML/HR: .6; .31; .03; .02 INJECTION, SOLUTION INTRAVENOUS at 20:36

## 2018-04-04 RX ADMIN — Medication 325 MG: at 08:18

## 2018-04-04 RX ADMIN — FOLIC ACID 1 MG: 1 TABLET ORAL at 08:18

## 2018-04-04 RX ADMIN — Medication 1 TABLET: at 08:17

## 2018-04-04 RX ADMIN — CHLORDIAZEPOXIDE HYDROCHLORIDE 25 MG: 25 CAPSULE ORAL at 05:28

## 2018-04-04 RX ADMIN — VERAPAMIL HYDROCHLORIDE 120 MG: 120 TABLET, FILM COATED, EXTENDED RELEASE ORAL at 17:24

## 2018-04-04 RX ADMIN — CHLORDIAZEPOXIDE HYDROCHLORIDE 25 MG: 25 CAPSULE ORAL at 21:31

## 2018-04-04 NOTE — CASE MANAGEMENT
Continued Stay Review    Date: 4/4/18    Vital Signs: /82 (BP Location: Right arm)   Pulse 82   Temp 98 4 °F (36 9 °C) (Oral)   Resp 18   Ht 6' (1 829 m)   Wt 76 1 kg (167 lb 11 2 oz)   SpO2 100%   BMI 22 74 kg/m²       MG LIPASE CMP CBC   DIET SURGICAL FULL LIQUID LO FATMedications:   Scheduled Meds:   Current Facility-Administered Medications:  vitamin C 1,500 mg Oral Daily Kirti Estevez, DO    chlordiazePOXIDE 25 mg Oral Q8H [address] Diana Layton MD    cyanocobalamin 1,000 mcg Oral Daily Kirti Estevez, DO    famotidine 20 mg Oral BID Kirti Estevez, DO    ferrous sulfate 325 mg Oral Daily With Breakfast Kirti Estevez, DO    folic acid 1 mg Oral Daily Diana Layton MD    lactated ringers 200 mL/hr Intravenous Continuous Duane Rang Last Rate: 200 mL/hr (04/03/18 2300)   LORazepam 2 mg Intravenous Q4H PRN Diana Layton MD    magnesium oxide 200 mg Oral Daily Kirti Estevez, DO    morphine injection 4 mg Intravenous Q4H PRN Kirti Estevez, DO    multivitamin-minerals 1 tablet Oral Daily Diana Layton MD    ondansetron 4 mg Intravenous Q6H PRN Kirti Estevez, DO    oxyCODONE 5 mg Oral Q4H PRN Kirti Estevez, DO    thiamine 100 mg Oral Daily Kelly Sutherland MD    traMADol 50 mg Oral Q6H PRN Kirti Estevez, DO    verapamil 120 mg Oral After Jaimie Duran MD      Continuous Infusions:   lactated ringers 200 mL/hr Last Rate: 200 mL/hr (04/03/18 2300)     PRN Meds: LORazepam    morphine injection    ondansetron    oxyCODONE    traMADol    Abnormal Labs/Diagnostic Results:    AST 04/04/2018 111*    ALT 04/04/2018 97*    Alkaline Phosphatase 04/04/2018 259*      Albumin 04/04/2018 2 6*    Total Bilirubin 04/04/2018 2 00*     WBC 04/04/2018 3 50*     RBC 04/04/2018 3 46*    Hemoglobin 04/04/2018 10 7*    Hematocrit 04/04/2018 33 3*     MCH 04/04/2018 31 1*     RDW 04/04/2018 19 8*    Platelets 43/47/4638 108*    MPV 04/04/2018 8 8*    Lipase 04/04/2018 1090*            Age/Sex: 51 y o  male     GI  Patient had episode of anxiety and paranoia overnight, including pulling his IV tube out and walking out into tuttle while speaking in his native [place]  Patient was redirected and given a dose of ativan and started on librium  Otherwise patient seen and evaluated at bedside calm, cooperative and quiet today non-tender abdomen on examination   Patient denies abdominal pain  Patient reports tolerating PO diet yesterday and denies nausea and vomiting overnight  Patient had 2 episodes of normally formed bowel movements today      Assessment/Plan:  51year old male with acute pancreatitis with acute pancreatitis with increased elevated lipase still requiring aggressive IVF hydration  Patient downgraded back to low-fat full liquid diet in light of lipase doubling since yesterday  Patient on librium and PRN ativan for symptoms of withdrawal overnight  1  Acute Interstitial Pancreatitis likely secondary to alcohol use: patient remained afebrile overnight, increased lipase today to 1,090 from 436 yesterday  Triglyceride wnl at 57  Clinically patient endorses symptomatic improvement and tolerating regular low-fat diet yesterday  RUQ ultrasound showed CBD 7mm at high normal dilation but no choledocholitiasis or intrahepatic biliary dilation  - placed patient back on low fat full liquids  - on lactated ringers at 200cc/hr  - continue antiemetics and pain control  - counseled alcohol cessation  2  Abnormal liver function likely secondary to alcohol: modest down trend of liver function test (AST//97 from 128/96 yesterday)  - continue to trend LFTs daily  - upon discharge will need to follow up with LFTs in 1 week discharge from hospital  3   Alcohol withdrawal; patient with signs and symptoms of withdrawal manifested as anxiety, paranoia and confusion overnight and treated with 1 dose of ativan overnight; patient calm and quiet today on examination  - patient on librium with PRN ativan  - continue on vit H-08, thiamine, folic acid, MVT    ATTENDING  Patient had an episode of hallucinations associated with shakiness and confusion last night  Patient was given Ativan for alcohol withdrawal and patient is better today  Patient denies any abdominal pain, nausea, vomiting shakiness      * Acute pancreatitis without infection or necrosis   Assessment & Plan     Noted on CT abdomen/pelvis  Likely secondary to alcohol use  Abdominal ultrasound showed hepatomegaly with fatty liver without any stones or biliary ductal dilatation  Patient's triglyceride levels were normal  Lipase level has increased from 436-1090  Change the diet back to full liquid diet  Follow-up LFTs and lipase level in a m  Continue IV fluids which were changed to LR at 200 milliliters per hr,  IV morphine, oxycodone IR as needed for pain control  Patient does admit to drinking 2-3 shots of cognac every day for the last 3-4 months   Elevated LFTs   Assessment & Plan     Patient noted to have elevated LFTs on lab work from December 2017 but appear worse during this admission  Likely due to alcohol abuse  Right upper quadrant ultrasound showed hepatomegaly with fatty change without any ductal dilatation  Tylenol, salicylate level low  hepatitis panel nonreactive  LFTs continued to improve  CT abdomen/pelvis showed hepatomegaly with prominent fatty infiltration and wedge-shaped hypoattenuation along the gallbladder         Occipital neuralgia   Assessment & Plan     Chronic in nature for patient but has worsened over the last 3 months  Patient has a neurostimulator in place  Likely also secondary to intractable migraine  Advised patient to follow up with neurologist and pain management doctor after discharge  Patient was started on verapamil  milligram p   O  daily and Magnesium oxide 20 milligram p   O  daily as verapamil can cause constipation    Plan is to start patient on carbamazepine 100 mg b i d  1 week later      Essential hypertension   Assessment & Plan     Patient does report high blood pressure previously but not on any medications as it was attributed to pain from his occipital neuralgia  Blood pressures seem like they are improving  Monitor blood pressures off of antihypertensives for now      Hyponatremia   Assessment & Plan     Likely hypovolemic hyponatremia  Sodium level is normal with IV hydration      Alcohol abuse   Assessment & Plan     Patient has been taking 2-3 shots of cognac every day for the last 2-4 months to help control his pain from occipital neurologia  Patient developed alcohol withdrawal yesterday and patient was started on Librium, thiamine, folic acid and multivitamin         History of gastric cancer   Assessment & Plan     Status post gastrojejunostomy and then complete gastrectomy due to recurrence of tumor at the margins   Continue outpatient follow-up      Pancytopenia ([facility] [address] )   Assessment & Plan     Patient noted to have progressively worsening white count, platelet count and hemoglobin  Most likely secondary to dilution from IV hydration  Counts of better today        VTE Pharmacologic Prophylaxis:   Pharmacologic: Pharmacologic VTE Prophylaxis contraindicated due to Thrombocytopenia  Mechanical VTE Prophylaxis in Place:  Yes

## 2018-04-04 NOTE — PROGRESS NOTES
Progress Note - Nolan Blue 1970, 51 y o  male MRN: [id number]    Unit/Bed#: [address] [place] [address] Encounter: [id number]    Primary Care Provider: Ekta Painter MD   Date and time admitted to hospital: 3/31/2018  8:55 AM        * Acute pancreatitis without infection or necrosis   Assessment & Plan    Noted on CT abdomen/pelvis  Likely secondary to alcohol use  Abdominal ultrasound showed hepatomegaly with fatty liver without any stones or biliary ductal dilatation  Patient's triglyceride levels were normal  Lipase level has increased from 436-1090  Change the diet back to full liquid diet  Follow-up LFTs and lipase level in a m  Continue IV fluids which were changed to LR at 200 milliliters per hr,  IV morphine, oxycodone IR as needed for pain control  Patient does admit to drinking 2-3 shots of cognac every day for the last 3-4 months          Elevated LFTs   Assessment & Plan    Patient noted to have elevated LFTs on lab work from December 2017 but appear worse during this admission  Likely due to alcohol abuse  Right upper quadrant ultrasound showed hepatomegaly with fatty change without any ductal dilatation  Tylenol, salicylate level low  hepatitis panel nonreactive  LFTs continued to improve  CT abdomen/pelvis showed hepatomegaly with prominent fatty infiltration and wedge-shaped hypoattenuation along the gallbladder  Occipital neuralgia   Assessment & Plan    Chronic in nature for patient but has worsened over the last 3 months  Patient has a neurostimulator in place  Likely also secondary to intractable migraine  Advised patient to follow up with neurologist and pain management doctor after discharge  Patient was started on verapamil  milligram p   O  daily and Magnesium oxide 20 milligram p   O  daily as verapamil can cause constipation    Plan is to start patient on carbamazepine 100 mg b i d  1 week later        Essential hypertension   Assessment & Plan    Patient does report high blood pressure previously but not on any medications as it was attributed to pain from his occipital neuralgia  Blood pressures seem like they are improving  Monitor blood pressures off of antihypertensives for now        Hyponatremia   Assessment & Plan    Likely hypovolemic hyponatremia  Sodium level is normal with IV hydration        Alcohol abuse   Assessment & Plan    Patient has been taking 2-3 shots of cognac every day for the last 2-4 months to help control his pain from occipital neurologia  Patient developed alcohol withdrawal yesterday and patient was started on Librium, thiamine, folic acid and multivitamin          History of gastric cancer   Assessment & Plan    Status post gastrojejunostomy and then complete gastrectomy due to recurrence of tumor at the margins   Continue outpatient follow-up        Pancytopenia [facility])   Assessment & Plan    Patient noted to have progressively worsening white count, platelet count and hemoglobin  Most likely secondary to dilution from IV hydration  Counts of better today              VTE Pharmacologic Prophylaxis:   Pharmacologic: Pharmacologic VTE Prophylaxis contraindicated due to Thrombocytopenia  Mechanical VTE Prophylaxis in Place: Yes    Patient Centered Rounds: I have performed bedside rounds with nursing staff today  Discussions with Specialists or Other Care Team Provider: Yes    Education and Discussions with Family / Patient:Yes    Time Spent for Care: 30 minutes  More than 50% of total time spent on counseling and coordination of care as described above  Current Length of Stay: 4 day(s)    Current Patient Status: Inpatient     Discharge Plan: Home    Code Status: Level 1 - Full Code      Subjective:   Patient had an episode of hallucinations associated with shakiness and confusion last night  Patient was given Ativan for alcohol withdrawal and patient is better today    Patient denies any abdominal pain, nausea, vomiting jessenia  Objective:       Vitals:   Temp (24hrs), Av 2 °F (36 8 °C), Min:97 6 °F (36 4 °C), Max:98 4 °F (36 9 °C)    HR:  [70-86] 70  Resp:  [18-20] 19  BP: (120-140)/(68-83) 124/68  SpO2:  [98 %-100 %] 98 %  Body mass index is 22 74 kg/m²  Input and Output Summary (last 24 hours):     No intake or output data in the 24 hours ending 18 1648    Physical Exam:     Physical Exam   Constitutional: No distress  HENT:   Head: Normocephalic and atraumatic  Nose: Nose normal    Mouth/Throat: Oropharynx is clear and moist    Eyes: Conjunctivae and EOM are normal  Pupils are equal, round, and reactive to light  Neck: Normal range of motion  Neck supple  No JVD present  Cardiovascular: Normal rate, regular rhythm and normal heart sounds  Exam reveals no gallop and no friction rub  No murmur heard  Pulmonary/Chest: Effort normal and breath sounds normal  No respiratory distress  He has no wheezes  He has no rales  He exhibits no tenderness  Abdominal: Soft  Bowel sounds are normal  He exhibits no distension  There is no tenderness  There is no rebound and no guarding  Musculoskeletal: He exhibits no edema  Neurological: He is alert  No cranial nerve deficit  Skin: Skin is warm and dry  No rash noted  Psychiatric: He has a normal mood and affect  Additional Data:     Labs:      Results from last 7 days  Lab Units 18  0528  18  0903   WBC Thousand/uL 3 50*  < > 5 50   HEMOGLOBIN g/dL 10 7*  < > 13 7*   HEMATOCRIT % 33 3*  < > 41 2*   PLATELETS Thousands/uL 108*  < > 135   LYMPHO PCT %  --   --  27   MONO PCT MAN %  --   --  6   < > = values in this interval not displayed      Results from last 7 days  Lab Units 18  0528   SODIUM mmol/L 136   POTASSIUM mmol/L 3 5   CHLORIDE mmol/L 101   CO2 mmol/L 27   BUN mg/dL 6   CREATININE mg/dL 0 79   CALCIUM mg/dL 8 5   TOTAL PROTEIN g/dL 6 7   BILIRUBIN TOTAL mg/dL 2 00*   ALK PHOS U/L 259*   ALT U/L 97*   AST U/L 111* GLUCOSE RANDOM mg/dL 98           * I Have Reviewed All Lab Data Listed Above  * Additional Pertinent Lab Tests Reviewed: Danielle 65 Admission Reviewed    Imaging:  Ct Abdomen Pelvis With Contrast    Result Date: 3/31/2018  Narrative: CT ABDOMEN AND PELVIS WITH IV CONTRAST INDICATION:   pancreatitis/abdominal pain  COMPARISON: 7/8/2011 TECHNIQUE:  CT examination of the abdomen and pelvis was performed  Axial, sagittal, and coronal 2D reformatted images were created from the source data and submitted for interpretation  Radiation dose length product (DLP) for this visit:  500 28 mGy-cm   This examination, like all CT scans performed in the [facility], was performed utilizing techniques to minimize radiation dose exposure, including the use of iterative  reconstruction and automated exposure control  IV Contrast:  100 mL of iohexol (OMNIPAQUE) Enteric Contrast:  Enteric contrast was not administered  FINDINGS: ABDOMEN LOWER CHEST:  No clinically significant abnormality identified in the visualized lower chest  LIVER/BILIARY TREE:  Enlarged fatty liver  Additional wedge-shaped hypodensities right hepatic lobe of uncertain etiology with infarctions difficult to exclude  GALLBLADDER:  No calcified gallstones  No pericholecystic inflammatory change  SPLEEN:  Unremarkable  PANCREAS:  Pancreatic head associated inflammatory change and mild free fluid consistent with pancreatitis without cyst, pseudocyst or abscess  ADRENAL GLANDS:  Unremarkable  KIDNEYS/URETERS:  Right lower pole 6 mm nonobstructive calculus  STOMACH AND BOWEL:  Mild to moderate sliding-type hiatal hernia noted  Inflammatory change noted at the hepatic flexure and ascending colon likely reflecting free fluid tracking inferiorly from the pancreas  Duodenal wall thickening and inflammatory change also noted  APPENDIX:  No findings to suggest appendicitis   ABDOMINOPELVIC CAVITY:  No ascites or free intraperitoneal air  No lymphadenopathy  VESSELS:  Unremarkable for patient's age  PELVIS REPRODUCTIVE ORGANS:  Unremarkable for patient's age  URINARY BLADDER:  Unremarkable  ABDOMINAL WALL/INGUINAL REGIONS:  Unremarkable  OSSEOUS STRUCTURES:  No acute fracture or destructive osseous lesion  Impression: 1  Pancreatic head inflammatory change and mild free fluid consistent with acute pancreatitis without complication  Associated inflammatory change involving the hepatic flexure and ascending colon likely reflects dependent retroperitoneal fluid rather than intrinsic colonic disease  Additionally, duodenal wall thickening and inflammatory change is likely reactive  2   Hepatomegaly with prominent fatty infiltration and additional regions of wedge-shaped hypoattenuation along the gallbladder  Findings could reflect more focal fat (favored) with wedge-shaped infarction difficult to entirely exclude  Vascular structures appear patent  Further evaluation with MRI suggested for further characterization  3   Hiatal hernia  4   Right lower renal pole 6 mm nonobstructive calculus  Workstation performed: [id number]     Us Right Upper Quadrant With Liver Dopplers    Result Date: 4/2/2018  Narrative: RIGHT UPPER QUADRANT ULTRASOUND WITH LIVER DOPPLER INDICATION:     elevated LFTs, evaluate for biliary pancreatiits  COMPARISON:  Abdomen and pelvic CT from 3/31/2018  TECHNIQUE:   Real-time ultrasound of the right upper quadrant was performed with a curvilinear transducer with both volumetric sweeps and still imaging techniques  FINDINGS: PANCREAS:  Portions of the pancreas are obscured by bowel gas  Visualized portions of the pancreas are unremarkable  AORTA AND IVC:  Visualized portions are normal for patient age  LIVER: Size:  Moderately enlarged  The liver measures 21 cm in the midclavicular line  Contour:  Surface contour is smooth  Parenchyma:   There is moderate diffuse increased echogenicity with smooth echotexture and acoustic beam attenuation  Most consistent with moderate hepatic steatosis  No evidence of suspicious mass  LIVER DOPPLER: The main portal vein and primary branch segments are patent and hepatopetal with normal spectral waveform  Hepatic veins are patent  Spectral waveforms within normal limits  Main hepatic artery appears normal size, patent with normal spectral waveform  BILIARY: Patient has undergone prior cholecystectomy  No intrahepatic biliary dilatation  CBD measures 7 mm  No choledocholithiasis  KIDNEY: Right kidney measures 12 cm  There is a 1 cm nonobstructing right kidney lower pole calyceal stone  There is also a tiny simple cyst in the right kidney lower pole ASCITES:   None  Impression: Moderate hepatomegaly and fatty liver change  There is no evidence of biliary ductal dilatation  There is a 1 cm nonobstructing right kidney lower pole calyceal stone   Workstation performed: [id number]     Imaging Reports Reviewed by myself    Cultures:   Blood Culture: No results found for: BLOODCX  Urine Culture: No results found for: URINECX  Sputum Culture: No components found for: SPUTUMCX  Wound Culture: No results found for: WOUNDCULT    Last 24 Hours Medication List:     Current Facility-Administered Medications:  vitamin C 1,500 mg Oral Daily Kirti Revcole, DO    chlordiazePOXIDE 25 mg Oral Q8H [address] Todd Leone MD    cyanocobalamin 1,000 mcg Oral Daily Kirti Estevez, DO    famotidine 20 mg Oral BID Kirti Estevez, DO    ferrous sulfate 325 mg Oral Daily With Breakfast Kirti Estevez, DO    folic acid 1 mg Oral Daily Todd Leone MD    lactated ringers 200 mL/hr Intravenous Continuous Jt Pickle Last Rate: 200 mL/hr (04/03/18 2300)   LORazepam 2 mg Intravenous Q4H PRN Diana Layton MD    magnesium oxide 200 mg Oral Daily Kirti Revankar, DO    morphine injection 4 mg Intravenous Q4H PRN Kirti Estevez, DO    multivitamin-minerals 1 tablet Oral Daily Diana Layton MD    ondansetron 4 mg Intravenous Q6H PRN Kirti Revankar, DO    oxyCODONE 5 mg Oral Q4H PRN Kirti Revankar, DO    thiamine 100 mg Oral Daily Dick Ron MD    traMADol 50 mg Oral Q6H PRN Kirti Revankar, DO    verapamil 120 mg Oral After Ketan Pate MD         Today, Patient Was Seen By: Ling Raza MD    ** Please Note: Dragon 360 Dictation voice to text software may have been used in the creation of this document   **

## 2018-04-04 NOTE — ASSESSMENT & PLAN NOTE
Likely secondary to alcohol use    Lipase level continues to improve  Abdominal ultrasound showed hepatomegaly with fatty liver without any stones or biliary ductal dilatation  Patient's triglyceride levels were normal  Lipase level has increased from [phone]-->888  Continue aggressive IV hydration with LR at 200 mL/hour  Patient is tolerating low-fat diet without any further symptoms  Discharge home on low-fat diet

## 2018-04-04 NOTE — ASSESSMENT & PLAN NOTE
Patient does report high blood pressure previously but not on any medications as it was attributed to pain from his occipital neuralgia  Blood pressures have been stable currently on verapamil

## 2018-04-04 NOTE — ASSESSMENT & PLAN NOTE
Patient noted to have progressively worsening white count, platelet count and hemoglobin initially  Most likely secondary to dilution from IV hydration  Counts continued to remain stable

## 2018-04-04 NOTE — PROGRESS NOTES
Progress Note - Nolan Blue 51 y o  male MRN: [id number]    Unit/Bed#: [address] [place] [address] Encounter: [id number]        Subjective:   Patient had episode of anxiety and paranoia overnight, including pulling his IV tube out and walking out into tuttle while speaking in his native [place]  Patient was redirected and given a dose of ativan and started on librium  Otherwise patient seen and evaluated at bedside calm, cooperative and quiet today non-tender abdomen on examination   Patient denies abdominal pain  Patient reports tolerating PO diet yesterday and denies nausea and vomiting overnight  Patient had 2 episodes of normally formed bowel movements today  Objective:     Vitals: Blood pressure 140/82, pulse 82, temperature 98 4 °F (36 9 °C), temperature source Oral, resp  rate 18, height 6' (1 829 m), weight 76 1 kg (167 lb 11 2 oz), SpO2 100 %  ,Body mass index is 22 74 kg/m²  Intake/Output Summary (Last 24 hours) at 04/04/18 1017  Last data filed at 04/03/18 1235   Gross per 24 hour   Intake             1000 ml   Output                0 ml   Net             1000 ml       Physical Exam:   General appearance: alert, appears stated age and cooperative  Lungs: clear to auscultation bilaterally, no labored breathing/accessory muscle use  Heart: regular rate and rhythm, S1, S2 normal, no murmur, click, rub or gallop  Abdomen: soft, non-tender; bowel sounds normal; no masses,  no organomegaly  Extremities: no edema  Neuro: bilateral resting hand tremors    Invasive Devices     Peripheral Intravenous Line            Peripheral IV 04/03/18 Left Forearm less than 1 day                Lab, Imaging and other studies: I have personally reviewed pertinent reports      Admission on 03/31/2018   Component Date Value    WBC 03/31/2018 5 50     RBC 03/31/2018 4 49*    Hemoglobin 03/31/2018 13 7*    Hematocrit 03/31/2018 41 2*    MCV 03/31/2018 92     MCH 03/31/2018 30 6     MCHC 03/31/2018 33 3     RDW 03/31/2018 21 3*    MPV 03/31/2018 8 8*    Platelets 55/08/1296 135     nRBC 03/31/2018 0     Sodium 03/31/2018 127*    Potassium 03/31/2018 4 7     Chloride 03/31/2018 90*    CO2 03/31/2018 12*    Anion Gap 03/31/2018 25*    BUN 03/31/2018 13     Creatinine 03/31/2018 0 86     Glucose 03/31/2018 185*    Calcium 03/31/2018 8 2*    AST 03/31/2018 472*    ALT 03/31/2018 207*    Alkaline Phosphatase 03/31/2018 334*    Total Protein 03/31/2018 6 9     Albumin 03/31/2018 2 8*    Total Bilirubin 03/31/2018 2 10*    eGFR 03/31/2018 102     Lipase 03/31/2018 1231*    Segmented % 03/31/2018 64     Bands % 03/31/2018 3     Lymphocytes % 03/31/2018 27     Monocytes % 03/31/2018 6     Neutrophils Absolute 03/31/2018 3 69     Lymphocytes Absolute 03/31/2018 1 49     Monocytes Absolute 03/31/2018 0 33     Total Counted 03/31/2018 100     Platelet Estimate 81/06/1833 Borderline*    MRSA Culture Only 03/31/2018 No Methicillin Resistant Staphlyococcus aureus (MRSA) isolated     Sodium 03/31/2018 127*    Osmolality, Ur 04/01/2018 1009*    Osmolality Serum 03/31/2018 283     Sodium, Ur 04/01/2018 87     Sodium 03/31/2018 133*    Potassium 03/31/2018 4 1     Chloride 03/31/2018 99*    CO2 03/31/2018 28     Anion Gap 03/31/2018 6     BUN 03/31/2018 12     Creatinine 03/31/2018 0 93     Glucose 03/31/2018 180*    Calcium 03/31/2018 7 0*    eGFR 03/31/2018 96     Acetaminophen Level 85/23/4343 <2*    Salicylate Lvl 28/13/2657 <3 0*    Lipase 04/01/2018 524*    Sodium 04/01/2018 132*    Potassium 04/01/2018 3 7     Chloride 04/01/2018 98*    CO2 04/01/2018 20*    Anion Gap 04/01/2018 14*    BUN 04/01/2018 5     Creatinine 04/01/2018 0 81     Glucose 04/01/2018 156*    Calcium 04/01/2018 7 1*    AST 04/01/2018 256*    ALT 04/01/2018 117*    Alkaline Phosphatase 04/01/2018 266*    Total Protein 04/01/2018 5 8*    Albumin 04/01/2018 2 3*    Total Bilirubin 04/01/2018 3 50*    eGFR 04/01/2018 104     Magnesium 04/01/2018 1 2*    WBC 04/01/2018 3 40*    RBC 04/01/2018 3 56*    Hemoglobin 04/01/2018 10 9*    Hematocrit 04/01/2018 33 6*    MCV 04/01/2018 94     MCH 04/01/2018 30 8     MCHC 04/01/2018 32 6     RDW 04/01/2018 20 2*    Platelets 49/68/4434 71*    MPV 04/01/2018 8 5*    Hepatitis B Surface Ag 04/01/2018 Non-reactive     Hep A IgM 04/01/2018 Non-reactive     Hepatitis C Ab 04/01/2018 Non-reactive     Hep B C IgM 04/01/2018 Non-reactive     Platelet Estimate 27/89/5734 Decreased*    Hepatitis B Surface Ag 04/01/2018 Non-reactive     Hepatitis C Ab 04/01/2018 Non-reactive     Hep B C IgM 04/01/2018 Non-reactive     Hep B Core Total Ab 04/01/2018 Non-reactive     Sodium 04/02/2018 136     Potassium 04/02/2018 4 1     Chloride 04/02/2018 102     CO2 04/02/2018 27     Anion Gap 04/02/2018 7     BUN 04/02/2018 3*    Creatinine 04/02/2018 0 80     Glucose 04/02/2018 98     Calcium 04/02/2018 7 6*    AST 04/02/2018 183*    ALT 04/02/2018 114*    Alkaline Phosphatase 04/02/2018 278*    Total Protein 04/02/2018 5 7*    Albumin 04/02/2018 2 3*    Total Bilirubin 04/02/2018 3 30*    eGFR 04/02/2018 105     Lipase 04/02/2018 397*    Magnesium 04/02/2018 1 7     Sodium 04/03/2018 137     Potassium 04/03/2018 3 6     Chloride 04/03/2018 103     CO2 04/03/2018 25     Anion Gap 04/03/2018 9     BUN 04/03/2018 3*    Creatinine 04/03/2018 0 75     Glucose 04/03/2018 95     Calcium 04/03/2018 7 8*    AST 04/03/2018 128*    ALT 04/03/2018 96*    Alkaline Phosphatase 04/03/2018 257*    Total Protein 04/03/2018 5 8*    Albumin 04/03/2018 2 2*    Total Bilirubin 04/03/2018 2 90*    eGFR 04/03/2018 108     Lipase 04/03/2018 436*    Magnesium 04/03/2018 1 7     WBC 04/03/2018 2 70*    RBC 04/03/2018 2 99*    Hemoglobin 04/03/2018 9 4*    Hematocrit 04/03/2018 28 7*    MCV 04/03/2018 96     MCH 04/03/2018 31 4*    MCHC 04/03/2018 32 7     RDW 04/03/2018 19 8*  Platelets 71/86/3038 83*    MPV 04/03/2018 8 1*    Cholesterol 04/03/2018 134     Triglycerides 04/03/2018 57     HDL, Direct 04/03/2018 21*    LDL Calculated 04/03/2018 102*    Anisocytosis 04/03/2018 Present     Platelet Estimate 78/38/3898 Decreased*    Sodium 04/04/2018 136     Potassium 04/04/2018 3 5     Chloride 04/04/2018 101     CO2 04/04/2018 27     Anion Gap 04/04/2018 8     BUN 04/04/2018 6     Creatinine 04/04/2018 0 79     Glucose 04/04/2018 98     Calcium 04/04/2018 8 5     AST 04/04/2018 111*    ALT 04/04/2018 97*    Alkaline Phosphatase 04/04/2018 259*    Total Protein 04/04/2018 6 7     Albumin 04/04/2018 2 6*    Total Bilirubin 04/04/2018 2 00*    eGFR 04/04/2018 105     WBC 04/04/2018 3 50*    RBC 04/04/2018 3 46*    Hemoglobin 04/04/2018 10 7*    Hematocrit 04/04/2018 33 3*    MCV 04/04/2018 96     MCH 04/04/2018 31 1*    MCHC 04/04/2018 32 3     RDW 04/04/2018 19 8*    Platelets 04/09/5562 108*    MPV 04/04/2018 8 8*    Lipase 04/04/2018 1090*         Principal Problem:    Acute pancreatitis without infection or necrosis  Active Problems:    Occipital neuralgia    Essential hypertension    Hyponatremia    Elevated LFTs    Alcohol abuse    History of gastric cancer    Hypomagnesemia    Pancytopenia (HCC)      Assessment/Plan:  51year old male with acute pancreatitis with acute pancreatitis with increased elevated lipase still requiring aggressive IVF hydration  Patient downgraded back to low-fat full liquid diet in light of lipase doubling since yesterday  Patient on librium and PRN ativan for symptoms of withdrawal overnight  1  Acute Interstitial Pancreatitis likely secondary to alcohol use: patient remained afebrile overnight, increased lipase today to 1,090 from 436 yesterday  Triglyceride wnl at 57  Clinically patient endorses symptomatic improvement and tolerating regular low-fat diet yesterday    RUQ ultrasound showed CBD 7mm at high normal dilation but no choledocholitiasis or intrahepatic biliary dilation  - placed patient back on low fat full liquids  - on lactated ringers at 200cc/hr  - continue antiemetics and pain control  - counseled alcohol cessation    2  Abnormal liver function likely secondary to alcohol: modest down trend of liver function test (AST//97 from 128/96 yesterday)  - continue to trend LFTs daily  - upon discharge will need to follow up with LFTs in 1 week discharge from hospital    3  Alcohol withdrawal; patient with signs and symptoms of withdrawal manifested as anxiety, paranoia and confusion overnight and treated with 1 dose of ativan overnight; patient calm and quiet today on examination  - patient on librium with PRN ativan  - continue on vit Y-76, thiamine, folic acid, MVT    Plan discussed with advanced practitioner Juana Moctezuma under the supervision of Dr Jose Plaza

## 2018-04-04 NOTE — ASSESSMENT & PLAN NOTE
Chronic in nature for patient but has worsened over the last 3 months  Patient has a neurostimulator in place  Likely also secondary to intractable migraine  Advised patient to follow up with neurologist and pain management doctor after discharge  Patient was started on verapamil  milligram p   O  daily and Magnesium oxide 400 milligram p   O  daily as verapamil can cause constipation    Plan is to start patient on carbamazepine 100 mg b i d  on April 9, 2018

## 2018-04-04 NOTE — ASSESSMENT & PLAN NOTE
Patient has been taking 2-3 shots of cognac every day for the last 2-4 months to help control his pain from occipital neurologia  No further signs of withdrawal  Advised on alcohol cessation

## 2018-04-04 NOTE — ASSESSMENT & PLAN NOTE
Patient noted to have elevated LFTs on lab work from December 2017 but appear worse during this admission  Likely due to alcohol abuse  Right upper quadrant ultrasound showed hepatomegaly with fatty change without any ductal dilatation  Tylenol, salicylate level low  hepatitis panel nonreactive  LFTs are normal  CT abdomen/pelvis showed hepatomegaly with prominent fatty infiltration and wedge-shaped hypoattenuation along the gallbladder

## 2018-04-04 NOTE — PROGRESS NOTES
The famotidine has / have been converted to Oral per [facility] IV-to[facility] Protocol for Adults as approved by the Pharmacy and Therapeutics Committee  The patient met all eligible criteria:  3 Age = 24years old   2) Received at least one dose of the IV form   3) Receiving at least one other scheduled oral/enteral medication   4) Tolerating an oral/enteral diet   and did not have any exclusions:   1) Critical care patient   2) Active GI bleed (IF assessing H2RAs or PPIs)   3) Continuous tube feeding (IF assessing cipro, doxycycline, levofloxacin, minocycline, rifampin, or voriconazole)   4) Receiving PO vancomycin (IF assessing metronidazole)   5) Persistent nausea and/or vomiting   6) Ileus or gastrointestinal obstruction   7) Grace/nasogastric tube set for continuous suction   8) Specific order not to automatically convert to PO (in the order's comments or if discussed in the most recent Infectious Disease or primary team's progress notes)

## 2018-04-05 LAB
ALBUMIN SERPL BCP-MCNC: 2.2 G/DL (ref 3.5–5)
ALP SERPL-CCNC: 182 U/L (ref 46–116)
ALT SERPL W P-5'-P-CCNC: 71 U/L (ref 12–78)
ANION GAP SERPL CALCULATED.3IONS-SCNC: 5 MMOL/L (ref 4–13)
AST SERPL W P-5'-P-CCNC: 78 U/L (ref 5–45)
BILIRUB SERPL-MCNC: 1 MG/DL (ref 0.2–1)
BUN SERPL-MCNC: 3 MG/DL (ref 5–25)
CALCIUM SERPL-MCNC: 8.5 MG/DL (ref 8.3–10.1)
CHLORIDE SERPL-SCNC: 104 MMOL/L (ref 100–108)
CO2 SERPL-SCNC: 30 MMOL/L (ref 21–32)
CREAT SERPL-MCNC: 0.73 MG/DL (ref 0.6–1.3)
ERYTHROCYTE [DISTWIDTH] IN BLOOD BY AUTOMATED COUNT: 20.3 % (ref 11.6–15.1)
GFR SERPL CREATININE-BSD FRML MDRD: 109 ML/MIN/1.73SQ M
GLUCOSE SERPL-MCNC: 89 MG/DL (ref 65–140)
HCT VFR BLD AUTO: 27.5 % (ref 42–52)
HGB BLD-MCNC: 9 G/DL (ref 14–18)
LIPASE SERPL-CCNC: 1246 U/L (ref 73–393)
MAGNESIUM SERPL-MCNC: 1.6 MG/DL (ref 1.6–2.6)
MCH RBC QN AUTO: 31.1 PG (ref 27–31)
MCHC RBC AUTO-ENTMCNC: 32.5 G/DL (ref 31.4–37.4)
MCV RBC AUTO: 96 FL (ref 82–98)
PLATELET # BLD AUTO: 108 THOUSANDS/UL (ref 130–400)
PMV BLD AUTO: 9 FL (ref 8.9–12.7)
POTASSIUM SERPL-SCNC: 3.4 MMOL/L (ref 3.5–5.3)
PROT SERPL-MCNC: 5.6 G/DL (ref 6.4–8.2)
RBC # BLD AUTO: 2.88 MILLION/UL (ref 4.7–6.1)
SODIUM SERPL-SCNC: 139 MMOL/L (ref 136–145)
WBC # BLD AUTO: 3.2 THOUSAND/UL (ref 4.8–10.8)

## 2018-04-05 PROCEDURE — 83690 ASSAY OF LIPASE: CPT | Performed by: INTERNAL MEDICINE

## 2018-04-05 PROCEDURE — 85027 COMPLETE CBC AUTOMATED: CPT | Performed by: INTERNAL MEDICINE

## 2018-04-05 PROCEDURE — 99232 SBSQ HOSP IP/OBS MODERATE 35: CPT | Performed by: INTERNAL MEDICINE

## 2018-04-05 PROCEDURE — 80053 COMPREHEN METABOLIC PANEL: CPT | Performed by: INTERNAL MEDICINE

## 2018-04-05 PROCEDURE — 83735 ASSAY OF MAGNESIUM: CPT | Performed by: INTERNAL MEDICINE

## 2018-04-05 RX ORDER — CHLORDIAZEPOXIDE HYDROCHLORIDE 25 MG/1
25 CAPSULE, GELATIN COATED ORAL EVERY 12 HOURS
Status: DISCONTINUED | OUTPATIENT
Start: 2018-04-06 | End: 2018-04-06 | Stop reason: HOSPADM

## 2018-04-05 RX ORDER — POTASSIUM CHLORIDE 20 MEQ/1
40 TABLET, EXTENDED RELEASE ORAL ONCE
Status: COMPLETED | OUTPATIENT
Start: 2018-04-05 | End: 2018-04-05

## 2018-04-05 RX ADMIN — CHLORDIAZEPOXIDE HYDROCHLORIDE 25 MG: 25 CAPSULE ORAL at 05:25

## 2018-04-05 RX ADMIN — FAMOTIDINE 20 MG: 20 TABLET ORAL at 08:23

## 2018-04-05 RX ADMIN — OXYCODONE HYDROCHLORIDE AND ACETAMINOPHEN 1500 MG: 500 TABLET ORAL at 08:23

## 2018-04-05 RX ADMIN — CHLORDIAZEPOXIDE HYDROCHLORIDE 25 MG: 25 CAPSULE ORAL at 13:32

## 2018-04-05 RX ADMIN — FAMOTIDINE 20 MG: 20 TABLET ORAL at 17:09

## 2018-04-05 RX ADMIN — SODIUM CHLORIDE, SODIUM LACTATE, POTASSIUM CHLORIDE, AND CALCIUM CHLORIDE 200 ML/HR: .6; .31; .03; .02 INJECTION, SOLUTION INTRAVENOUS at 11:48

## 2018-04-05 RX ADMIN — CYANOCOBALAMIN TAB 500 MCG 1000 MCG: 500 TAB at 08:22

## 2018-04-05 RX ADMIN — POTASSIUM CHLORIDE 40 MEQ: 1500 TABLET, EXTENDED RELEASE ORAL at 09:45

## 2018-04-05 RX ADMIN — SODIUM CHLORIDE, SODIUM LACTATE, POTASSIUM CHLORIDE, AND CALCIUM CHLORIDE 200 ML/HR: .6; .31; .03; .02 INJECTION, SOLUTION INTRAVENOUS at 01:23

## 2018-04-05 RX ADMIN — Medication 1 TABLET: at 08:22

## 2018-04-05 RX ADMIN — Medication 325 MG: at 08:22

## 2018-04-05 RX ADMIN — FOLIC ACID 1 MG: 1 TABLET ORAL at 08:23

## 2018-04-05 RX ADMIN — SODIUM CHLORIDE, SODIUM LACTATE, POTASSIUM CHLORIDE, AND CALCIUM CHLORIDE 200 ML/HR: .6; .31; .03; .02 INJECTION, SOLUTION INTRAVENOUS at 06:35

## 2018-04-05 RX ADMIN — VERAPAMIL HYDROCHLORIDE 120 MG: 120 TABLET, FILM COATED, EXTENDED RELEASE ORAL at 17:09

## 2018-04-05 RX ADMIN — SODIUM CHLORIDE, SODIUM LACTATE, POTASSIUM CHLORIDE, AND CALCIUM CHLORIDE 200 ML/HR: .6; .31; .03; .02 INJECTION, SOLUTION INTRAVENOUS at 22:13

## 2018-04-05 RX ADMIN — SODIUM CHLORIDE, SODIUM LACTATE, POTASSIUM CHLORIDE, AND CALCIUM CHLORIDE 200 ML/HR: .6; .31; .03; .02 INJECTION, SOLUTION INTRAVENOUS at 17:09

## 2018-04-05 RX ADMIN — Medication 100 MG: at 08:22

## 2018-04-05 RX ADMIN — Medication 200 MG: at 08:23

## 2018-04-05 NOTE — CASE MANAGEMENT
Continued Stay Review    Date: 4/5/18    Vital Signs: /75 (BP Location: Right arm)   Pulse 60   Temp 98 °F (36 7 °C) (Oral)   Resp 16   Ht 6' (1 829 m)   Wt 76 1 kg (167 lb 11 2 oz)   SpO2 99%   BMI 22 74 kg/m²     Medications:   Scheduled Meds:   Current Facility-Administered Medications:  vitamin C 1,500 mg Oral Daily Kirti Revankar, DO    chlordiazePOXIDE 25 mg Oral Q8H [facility] Diana Layton MD    cyanocobalamin 1,000 mcg Oral Daily Kirti Shepardankar, DO    famotidine 20 mg Oral BID Kirti Shepardankar, DO    ferrous sulfate 325 mg Oral Daily With Breakfast Kirti Estevez, DO    folic acid 1 mg Oral Daily Diana Layton MD    lactated ringers 200 mL/hr Intravenous Continuous Ambika Hopperuser Last Rate: 200 mL/hr (04/05/18 1148)   LORazepam 2 mg Intravenous Q4H PRN Diana Layton MD    magnesium oxide 200 mg Oral Daily Kirti Shepardankar, DO    morphine injection 4 mg Intravenous Q4H PRN Kirti Estevez, DO    multivitamin-minerals 1 tablet Oral Daily Diana Layton MD    ondansetron 4 mg Intravenous Q6H PRN Kirti Estevez, DO    oxyCODONE 5 mg Oral Q4H PRN Kirti Shepardankar, DO    thiamine 100 mg Oral Daily Foster Carreon MD    traMADol 50 mg Oral Q6H PRN Kirti Estevez, DO    verapamil 120 mg Oral After Dwayne Major MD      Continuous Infusions:   lactated ringers 200 mL/hr Last Rate: 200 mL/hr (04/05/18 1148)     PRN Meds: LORazepam    morphine injection    ondansetron    oxyCODONE    traMADol    Abnormal Labs/Diagnostic Results:   RBC 04/05/2018 2 88*     Hemoglobin 04/05/2018 9 0*    Hematocrit 04/05/2018 27 5*      Platelets 37/72/9758 108      Potassium 04/05/2018 3 4*     AST 04/05/2018 78*     Alkaline Phosphatase 04/05/2018 182*     Total Protein 04/05/2018 5 6*    Albumin 04/05/2018 2 2*      Lipase 04/05/2018 1246*       Age/Sex: 51 y o  male     GI CONSULT  1    Acute interstitial pancreatitis likely secondary to alcohol:  Clinically patient is improving -he is pain free however lipase is trending up-lipase is 1246 today  Renal function is preserved, hemoglobin is stable, no evidence of overt bleeding   -can advance to soft low-fat diet tonight, repeat lipase tomorrow morning, if lipase is still trending up tomorrow, consider repeat CT scan   -meanwhile continue aggressive IV fluid resuscitation and replete electrolytes as necessary      2  Alcohol withdrawal:  Improving, not tremulous, no tongue fasciculations on exam   Vitals have remained stable   -continue multivitamin, thiamine and folate

## 2018-04-05 NOTE — PROGRESS NOTES
Progress Note - Nolan Blue 1970, 51 y o  male MRN: [id number]    Unit/Bed#: [address] [place] [address] Encounter: [id number]    Primary Care Provider: Kahlil Villeda MD   Date and time admitted to hospital: 3/31/2018  8:55 AM        * Acute pancreatitis without infection or necrosis   Assessment & Plan      Likely secondary to alcohol use  Abdominal ultrasound showed hepatomegaly with fatty liver without any stones or biliary ductal dilatation  Patient's triglyceride levels were normal  Lipase level has increased from [phone]  Continue aggressive IV hydration with LR at 200 mL/hour  Patient was on full liquid diet which was advanced to low-fat diet for dinner  Follow-up lipase level in a m  If patient's lipase level continues to increase patient may need repeat CT scan of the abdomen to rule out pancreatitis complications          Elevated LFTs   Assessment & Plan    Patient noted to have elevated LFTs on lab work from December 2017 but appear worse during this admission  Likely due to alcohol abuse  Right upper quadrant ultrasound showed hepatomegaly with fatty change without any ductal dilatation  Tylenol, salicylate level low  hepatitis panel nonreactive  LFTs continued to improve  CT abdomen/pelvis showed hepatomegaly with prominent fatty infiltration and wedge-shaped hypoattenuation along the gallbladder  Occipital neuralgia   Assessment & Plan    Chronic in nature for patient but has worsened over the last 3 months  Patient has a neurostimulator in place  Likely also secondary to intractable migraine  Advised patient to follow up with neurologist and pain management doctor after discharge  Patient was started on verapamil  milligram p   O  daily and Magnesium oxide 20 milligram p   O  daily as verapamil can cause constipation    Plan is to start patient on carbamazepine 100 mg b i d  1 week later        Essential hypertension   Assessment & Plan    Patient does report high blood pressure previously but not on any medications as it was attributed to pain from his occipital neuralgia  Blood pressures have been stable currently on verapamil        Hyponatremia   Assessment & Plan    Likely hypovolemic hyponatremia  Sodium level is normal with IV hydration        Alcohol abuse   Assessment & Plan    Patient has been taking 2-3 shots of cognac every day for the last 2-4 months to help control his pain from occipital neurologia  No further signs of withdrawal  Patient has been stable on multivitamin, thiamine, folic acid, Librium and Ativan  Will taper Librium        History of gastric cancer   Assessment & Plan    Status post gastrojejunostomy and then complete gastrectomy due to recurrence of tumor at the margins   Continue outpatient follow-up        Pancytopenia [facility])   Assessment & Plan    Patient noted to have progressively worsening white count, platelet count and hemoglobin initially  Most likely secondary to dilution from IV hydration  Counts continued to remain stable              VTE Pharmacologic Prophylaxis:   Pharmacologic: Pharmacologic VTE Prophylaxis contraindicated due to Thrombocytopenia  Mechanical VTE Prophylaxis in Place: Yes    Patient Centered Rounds: I have performed bedside rounds with nursing staff today  Discussions with Specialists or Other Care Team Provider: Yes Dr wolff    Education and Discussions with Family / Patient:YesWife    Time Spent for Care: 30 minutes  More than 50% of total time spent on counseling and coordination of care as described above  Current Length of Stay: 5 day(s)    Current Patient Status: Inpatient     Discharge Plan: Home    Code Status: Level 1 - Full Code      Subjective:   Patient is feeling much better today  Denies any nausea, vomiting or abdominal pain  Wants to eat more    Patient does not have any further shakiness or heavy sedation speech    Objective:       Vitals:   Temp (24hrs), Av 3 °F (36 8 °C), Min:98 °F (36 7 °C), Max:98 8 °F (37 1 °C)    HR:  [60-69] 65  Resp:  [16] 16  BP: (106-124)/(72-80) 106/77  SpO2:  [97 %-99 %] 99 %  Body mass index is 22 74 kg/m²  Input and Output Summary (last 24 hours):     No intake or output data in the 24 hours ending 04/05/18 1853    Physical Exam:     Physical Exam   Constitutional: No distress  HENT:   Head: Normocephalic and atraumatic  Nose: Nose normal    Mouth/Throat: Oropharynx is clear and moist    Eyes: Conjunctivae and EOM are normal  Pupils are equal, round, and reactive to light  Neck: Normal range of motion  Neck supple  No JVD present  Cardiovascular: Normal rate, regular rhythm and normal heart sounds  Exam reveals no gallop and no friction rub  No murmur heard  Pulmonary/Chest: Effort normal and breath sounds normal  No respiratory distress  He has no wheezes  He has no rales  He exhibits no tenderness  Abdominal: Soft  Bowel sounds are normal  He exhibits no distension  There is no tenderness  There is no rebound and no guarding  Musculoskeletal: He exhibits no edema  Neurological: He is alert  No cranial nerve deficit  Skin: Skin is warm and dry  No rash noted  Psychiatric: He has a normal mood and affect  Additional Data:     Labs:      Results from last 7 days  Lab Units 04/05/18  0529  03/31/18  0903   WBC Thousand/uL 3 20*  < > 5 50   HEMOGLOBIN g/dL 9 0*  < > 13 7*   HEMATOCRIT % 27 5*  < > 41 2*   PLATELETS Thousands/uL 108*  < > 135   LYMPHO PCT %  --   --  27   MONO PCT MAN %  --   --  6   < > = values in this interval not displayed  Results from last 7 days  Lab Units 04/05/18  0529   SODIUM mmol/L 139   POTASSIUM mmol/L 3 4*   CHLORIDE mmol/L 104   CO2 mmol/L 30   BUN mg/dL 3*   CREATININE mg/dL 0 73   CALCIUM mg/dL 8 5   TOTAL PROTEIN g/dL 5 6*   BILIRUBIN TOTAL mg/dL 1 00   ALK PHOS U/L 182*   ALT U/L 71   AST U/L 78*   GLUCOSE RANDOM mg/dL 89           * I Have Reviewed All Lab Data Listed Above    * Additional Pertinent Lab Tests Reviewed: Danielle 65 Admission Reviewed    Imaging:  Ct Abdomen Pelvis With Contrast    Result Date: 3/31/2018  Narrative: CT ABDOMEN AND PELVIS WITH IV CONTRAST INDICATION:   pancreatitis/abdominal pain  COMPARISON: 7/8/2011 TECHNIQUE:  CT examination of the abdomen and pelvis was performed  Axial, sagittal, and coronal 2D reformatted images were created from the source data and submitted for interpretation  Radiation dose length product (DLP) for this visit:  500 28 mGy-cm   This examination, like all CT scans performed in the [facility], was performed utilizing techniques to minimize radiation dose exposure, including the use of iterative  reconstruction and automated exposure control  IV Contrast:  100 mL of iohexol (OMNIPAQUE) Enteric Contrast:  Enteric contrast was not administered  FINDINGS: ABDOMEN LOWER CHEST:  No clinically significant abnormality identified in the visualized lower chest  LIVER/BILIARY TREE:  Enlarged fatty liver  Additional wedge-shaped hypodensities right hepatic lobe of uncertain etiology with infarctions difficult to exclude  GALLBLADDER:  No calcified gallstones  No pericholecystic inflammatory change  SPLEEN:  Unremarkable  PANCREAS:  Pancreatic head associated inflammatory change and mild free fluid consistent with pancreatitis without cyst, pseudocyst or abscess  ADRENAL GLANDS:  Unremarkable  KIDNEYS/URETERS:  Right lower pole 6 mm nonobstructive calculus  STOMACH AND BOWEL:  Mild to moderate sliding-type hiatal hernia noted  Inflammatory change noted at the hepatic flexure and ascending colon likely reflecting free fluid tracking inferiorly from the pancreas  Duodenal wall thickening and inflammatory change also noted  APPENDIX:  No findings to suggest appendicitis  ABDOMINOPELVIC CAVITY:  No ascites or free intraperitoneal air  No lymphadenopathy  VESSELS:  Unremarkable for patient's age   PELVIS REPRODUCTIVE ORGANS:  Unremarkable for patient's age  URINARY BLADDER:  Unremarkable  ABDOMINAL WALL/INGUINAL REGIONS:  Unremarkable  OSSEOUS STRUCTURES:  No acute fracture or destructive osseous lesion  Impression: 1  Pancreatic head inflammatory change and mild free fluid consistent with acute pancreatitis without complication  Associated inflammatory change involving the hepatic flexure and ascending colon likely reflects dependent retroperitoneal fluid rather than intrinsic colonic disease  Additionally, duodenal wall thickening and inflammatory change is likely reactive  2   Hepatomegaly with prominent fatty infiltration and additional regions of wedge-shaped hypoattenuation along the gallbladder  Findings could reflect more focal fat (favored) with wedge-shaped infarction difficult to entirely exclude  Vascular structures appear patent  Further evaluation with MRI suggested for further characterization  3   Hiatal hernia  4   Right lower renal pole 6 mm nonobstructive calculus  Workstation performed: [id number]     Us Right Upper Quadrant With Liver Dopplers    Result Date: 4/2/2018  Narrative: RIGHT UPPER QUADRANT ULTRASOUND WITH LIVER DOPPLER INDICATION:     elevated LFTs, evaluate for biliary pancreatiits  COMPARISON:  Abdomen and pelvic CT from 3/31/2018  TECHNIQUE:   Real-time ultrasound of the right upper quadrant was performed with a curvilinear transducer with both volumetric sweeps and still imaging techniques  FINDINGS: PANCREAS:  Portions of the pancreas are obscured by bowel gas  Visualized portions of the pancreas are unremarkable  AORTA AND IVC:  Visualized portions are normal for patient age  LIVER: Size:  Moderately enlarged  The liver measures 21 cm in the midclavicular line  Contour:  Surface contour is smooth  Parenchyma: There is moderate diffuse increased echogenicity with smooth echotexture and acoustic beam attenuation  Most consistent with moderate hepatic steatosis  No evidence of suspicious mass   LIVER DOPPLER: The main portal vein and primary branch segments are patent and hepatopetal with normal spectral waveform  Hepatic veins are patent  Spectral waveforms within normal limits  Main hepatic artery appears normal size, patent with normal spectral waveform  BILIARY: Patient has undergone prior cholecystectomy  No intrahepatic biliary dilatation  CBD measures 7 mm  No choledocholithiasis  KIDNEY: Right kidney measures 12 cm  There is a 1 cm nonobstructing right kidney lower pole calyceal stone  There is also a tiny simple cyst in the right kidney lower pole ASCITES:   None  Impression: Moderate hepatomegaly and fatty liver change  There is no evidence of biliary ductal dilatation  There is a 1 cm nonobstructing right kidney lower pole calyceal stone   Workstation performed: [id number]     Imaging Reports Reviewed by myself    Cultures:   Blood Culture: No results found for: BLOODCX  Urine Culture: No results found for: URINECX  Sputum Culture: No components found for: SPUTUMCX  Wound Culture: No results found for: WOUNDCULT    Last 24 Hours Medication List:     Current Facility-Administered Medications:  vitamin C 1,500 mg Oral Daily Kirti Estevez, DO    [START ON 4/6/2018] chlordiazePOXIDE 25 mg Oral Q12H Diana Layton MD    cyanocobalamin 1,000 mcg Oral Daily Kirti Espinosaar, DO    famotidine 20 mg Oral BID Kirti Estevez, DO    ferrous sulfate 325 mg Oral Daily With Breakfast Kirti Estevez, DO    folic acid 1 mg Oral Daily Diana Layton MD    lactated ringers 200 mL/hr Intravenous Continuous Jeni Dun Last Rate: 200 mL/hr (04/05/18 1709)   LORazepam 2 mg Intravenous Q4H PRN Diana Layton MD    magnesium oxide 200 mg Oral Daily Kirti Revankar, DO    morphine injection 4 mg Intravenous Q4H PRN Kirti Revcole, DO    multivitamin-minerals 1 tablet Oral Daily Marie Murcia MD    ondansetron 4 mg Intravenous Q6H PRN Kirti Estevez, DO    oxyCODONE 5 mg Oral Q4H PRN Kirti DO Zenaida    thiamine 100 mg Oral Daily Betsy Gomes MD    traMADol 50 mg Oral Q6H PRN Kirti Estevez DO    verapamil 120 mg Oral After Parvin Nelson MD         Today, Patient Was Seen By: Wayne Rey MD    ** Please Note: Dragon 360 Dictation voice to text software may have been used in the creation of this document   **

## 2018-04-05 NOTE — PLAN OF CARE
CONFUSION/THOUGHT DISTURBANCE     Thought disturbances (confusion, delirium, depression, dementia or psychosis) are managed to maintain or return to baseline mental status and functional level [address] Discharge to home or other facility with appropriate resources Progressing        DISCHARGE PLANNING - CARE MANAGEMENT     Discharge to post-acute care or home with appropriate resources Progressing        GASTROINTESTINAL - ADULT     Minimal or absence of nausea and/or vomiting Progressing     Maintains or returns to baseline bowel function Progressing     Maintains adequate nutritional intake Progressing        Knowledge Deficit     Patient/family/caregiver demonstrates understanding of disease process, treatment plan, medications, and discharge instructions Progressing        Nutrition/Hydration-ADULT     Nutrient/Hydration intake appropriate for improving, restoring or maintaining nutritional needs Progressing        PAIN - ADULT     Verbalizes/displays adequate comfort level or baseline comfort level Progressing        Potential for Falls     Patient will remain free of falls Progressing        SUBSTANCE USE/ABUSE     Will have no detox symptoms and will verbalize plan for changing substance-related behavior Progressing

## 2018-04-05 NOTE — PROGRESS NOTES
Progress Note - Nolan Blue 51 y o  male MRN: [id number]    Unit/Bed#: [address] [place] [address] Encounter: [id number]        Subjective:   Patient reports to be feeling very well, he denies any abdominal pain or nausea  Denies any subjective fevers or chills  No shortness of breath or chest pain  No lightheadedness or dizziness  Reports to be tolerating full liquid low-fat diet well  Objective:     Vitals: Blood pressure 124/75, pulse 60, temperature 98 °F (36 7 °C), temperature source Oral, resp  rate 16, height 6' (1 829 m), weight 76 1 kg (167 lb 11 2 oz), SpO2 99 %  ,Body mass index is 22 74 kg/m²  No intake or output data in the 24 hours ending 04/05/18 1130    Physical Exam:   General appearance: alert, appears stated age and cooperative  Lungs: clear to auscultation bilaterally, no labored breathing/accessory muscle use  Heart: regular rate and rhythm, S1, S2 normal, no murmur, click, rub or gallop  Abdomen: soft, non-tender; bowel sounds normal; no masses,  no organomegaly  Extremities: no edema    Invasive Devices     Peripheral Intravenous Line            Peripheral IV 04/03/18 Left Forearm 1 day                Lab, Imaging and other studies: I have personally reviewed pertinent reports      Admission on 03/31/2018   Component Date Value    WBC 03/31/2018 5 50     RBC 03/31/2018 4 49*    Hemoglobin 03/31/2018 13 7*    Hematocrit 03/31/2018 41 2*    MCV 03/31/2018 92     MCH 03/31/2018 30 6     MCHC 03/31/2018 33 3     RDW 03/31/2018 21 3*    MPV 03/31/2018 8 8*    Platelets 33/11/6166 135     nRBC 03/31/2018 0     Sodium 03/31/2018 127*    Potassium 03/31/2018 4 7     Chloride 03/31/2018 90*    CO2 03/31/2018 12*    Anion Gap 03/31/2018 25*    BUN 03/31/2018 13     Creatinine 03/31/2018 0 86     Glucose 03/31/2018 185*    Calcium 03/31/2018 8 2*    AST 03/31/2018 472*    ALT 03/31/2018 207*    Alkaline Phosphatase 03/31/2018 334*    Total Protein 03/31/2018 6 9     Albumin 03/31/2018 2 8*    Total Bilirubin 03/31/2018 2 10*    eGFR 03/31/2018 102     Lipase 03/31/2018 1231*    Segmented % 03/31/2018 64     Bands % 03/31/2018 3     Lymphocytes % 03/31/2018 27     Monocytes % 03/31/2018 6     Neutrophils Absolute 03/31/2018 3 69     Lymphocytes Absolute 03/31/2018 1 49     Monocytes Absolute 03/31/2018 0 33     Total Counted 03/31/2018 100     Platelet Estimate 44/39/3530 Borderline*    MRSA Culture Only 03/31/2018 No Methicillin Resistant Staphlyococcus aureus (MRSA) isolated     Sodium 03/31/2018 127*    Osmolality, Ur 04/01/2018 1009*    Osmolality Serum 03/31/2018 283     Sodium, Ur 04/01/2018 87     Sodium 03/31/2018 133*    Potassium 03/31/2018 4 1     Chloride 03/31/2018 99*    CO2 03/31/2018 28     Anion Gap 03/31/2018 6     BUN 03/31/2018 12     Creatinine 03/31/2018 0 93     Glucose 03/31/2018 180*    Calcium 03/31/2018 7 0*    eGFR 03/31/2018 96     Acetaminophen Level 53/33/5430 <2*    Salicylate Lvl 51/85/2673 <3 0*    Lipase 04/01/2018 524*    Sodium 04/01/2018 132*    Potassium 04/01/2018 3 7     Chloride 04/01/2018 98*    CO2 04/01/2018 20*    Anion Gap 04/01/2018 14*    BUN 04/01/2018 5     Creatinine 04/01/2018 0 81     Glucose 04/01/2018 156*    Calcium 04/01/2018 7 1*    AST 04/01/2018 256*    ALT 04/01/2018 117*    Alkaline Phosphatase 04/01/2018 266*    Total Protein 04/01/2018 5 8*    Albumin 04/01/2018 2 3*    Total Bilirubin 04/01/2018 3 50*    eGFR 04/01/2018 104     Magnesium 04/01/2018 1 2*    WBC 04/01/2018 3 40*    RBC 04/01/2018 3 56*    Hemoglobin 04/01/2018 10 9*    Hematocrit 04/01/2018 33 6*    MCV 04/01/2018 94     MCH 04/01/2018 30 8     MCHC 04/01/2018 32 6     RDW 04/01/2018 20 2*    Platelets 88/11/8944 71*    MPV 04/01/2018 8 5*    Hepatitis B Surface Ag 04/01/2018 Non-reactive     Hep A IgM 04/01/2018 Non-reactive     Hepatitis C Ab 04/01/2018 Non-reactive     Hep B C IgM 04/01/2018 Non-reactive     Platelet Estimate 08/28/9266 Decreased*    Hepatitis B Surface Ag 04/01/2018 Non-reactive     Hepatitis C Ab 04/01/2018 Non-reactive     Hep B C IgM 04/01/2018 Non-reactive     Hep B Core Total Ab 04/01/2018 Non-reactive     Sodium 04/02/2018 136     Potassium 04/02/2018 4 1     Chloride 04/02/2018 102     CO2 04/02/2018 27     Anion Gap 04/02/2018 7     BUN 04/02/2018 3*    Creatinine 04/02/2018 0 80     Glucose 04/02/2018 98     Calcium 04/02/2018 7 6*    AST 04/02/2018 183*    ALT 04/02/2018 114*    Alkaline Phosphatase 04/02/2018 278*    Total Protein 04/02/2018 5 7*    Albumin 04/02/2018 2 3*    Total Bilirubin 04/02/2018 3 30*    eGFR 04/02/2018 105     Lipase 04/02/2018 397*    Magnesium 04/02/2018 1 7     Sodium 04/03/2018 137     Potassium 04/03/2018 3 6     Chloride 04/03/2018 103     CO2 04/03/2018 25     Anion Gap 04/03/2018 9     BUN 04/03/2018 3*    Creatinine 04/03/2018 0 75     Glucose 04/03/2018 95     Calcium 04/03/2018 7 8*    AST 04/03/2018 128*    ALT 04/03/2018 96*    Alkaline Phosphatase 04/03/2018 257*    Total Protein 04/03/2018 5 8*    Albumin 04/03/2018 2 2*    Total Bilirubin 04/03/2018 2 90*    eGFR 04/03/2018 108     Lipase 04/03/2018 436*    Magnesium 04/03/2018 1 7     WBC 04/03/2018 2 70*    RBC 04/03/2018 2 99*    Hemoglobin 04/03/2018 9 4*    Hematocrit 04/03/2018 28 7*    MCV 04/03/2018 96     MCH 04/03/2018 31 4*    MCHC 04/03/2018 32 7     RDW 04/03/2018 19 8*    Platelets 31/49/9843 83*    MPV 04/03/2018 8 1*    Cholesterol 04/03/2018 134     Triglycerides 04/03/2018 57     HDL, Direct 04/03/2018 21*    LDL Calculated 04/03/2018 102*    Anisocytosis 04/03/2018 Present     Platelet Estimate 06/07/8402 Decreased*    Sodium 04/04/2018 136     Potassium 04/04/2018 3 5     Chloride 04/04/2018 101     CO2 04/04/2018 27     Anion Gap 04/04/2018 8     BUN 04/04/2018 6     Creatinine 04/04/2018 0 79     Glucose 04/04/2018 98     Calcium 04/04/2018 8 5     AST 04/04/2018 111*    ALT 04/04/2018 97*    Alkaline Phosphatase 04/04/2018 259*    Total Protein 04/04/2018 6 7     Albumin 04/04/2018 2 6*    Total Bilirubin 04/04/2018 2 00*    eGFR 04/04/2018 105     WBC 04/04/2018 3 50*    RBC 04/04/2018 3 46*    Hemoglobin 04/04/2018 10 7*    Hematocrit 04/04/2018 33 3*    MCV 04/04/2018 96     MCH 04/04/2018 31 1*    MCHC 04/04/2018 32 3     RDW 04/04/2018 19 8*    Platelets 01/99/9653 108*    MPV 04/04/2018 8 8*    Lipase 04/04/2018 1090*    WBC 04/05/2018 3 20*    RBC 04/05/2018 2 88*    Hemoglobin 04/05/2018 9 0*    Hematocrit 04/05/2018 27 5*    MCV 04/05/2018 96     MCH 04/05/2018 31 1*    MCHC 04/05/2018 32 5     RDW 04/05/2018 20 3*    Platelets 78/15/7493 108*    MPV 04/05/2018 9 0     Sodium 04/05/2018 139     Potassium 04/05/2018 3 4*    Chloride 04/05/2018 104     CO2 04/05/2018 30     Anion Gap 04/05/2018 5     BUN 04/05/2018 3*    Creatinine 04/05/2018 0 73     Glucose 04/05/2018 89     Calcium 04/05/2018 8 5     AST 04/05/2018 78*    ALT 04/05/2018 71     Alkaline Phosphatase 04/05/2018 182*    Total Protein 04/05/2018 5 6*    Albumin 04/05/2018 2 2*    Total Bilirubin 04/05/2018 1 00     eGFR 04/05/2018 109     Lipase 04/05/2018 1246*    Magnesium 04/05/2018 1 6      Results for Patrick Reilly (MRN [id number]) as of 4/5/2018 11:32   Ref   Range 4/2/2018 04:41 4/3/2018 06:38 4/4/2018 05:28 4/5/2018 05:29   eGFR Latest Units: ml/min/1 73sq m 105 108 105 109   Sodium Latest Ref Range: 136 - 145 mmol/L 136 137 136 139   Potassium Latest Ref Range: 3 5 - 5 3 mmol/L 4 1 3 6 3 5 3 4 (L)   Chloride Latest Ref Range: 100 - 108 mmol/L 102 103 101 104   CO2 Latest Ref Range: 21 - 32 mmol/L 27 25 27 30   Anion Gap Latest Ref Range: 4 - 13 mmol/L 7 9 8 5   BUN Latest Ref Range: 5 - 25 mg/dL 3 (L) 3 (L) 6 3 (L)   Creatinine Latest Ref Range: 0 60 - 1 30 mg/dL 0 80 0 75 0  79 0 73   Glucose Latest Ref Range: 65 - 140 mg/dL 98 95 98 89   Calcium Latest Ref Range: 8 3 - 10 1 mg/dL 7 6 (L) 7 8 (L) 8 5 8 5   AST Latest Ref Range: 5 - 45 U/L 183 (H) 128 (H) 111 (H) 78 (H)   ALT Latest Ref Range: 12 - 78 U/L 114 (H) 96 (H) 97 (H) 71   Alkaline Phosphatase Latest Ref Range: 46 - 116 U/L 278 (H) 257 (H) 259 (H) 182 (H)   Total Protein Latest Ref Range: 6 4 - 8 2 g/dL 5 7 (L) 5 8 (L) 6 7 5 6 (L)   Albumin Latest Ref Range: 3 5 - 5 0 g/dL 2 3 (L) 2 2 (L) 2 6 (L) 2 2 (L)   Total Bilirubin Latest Ref Range: 0 20 - 1 00 mg/dL 3 30 (H) 2 90 (H) 2 00 (H) 1 00   Magnesium Latest Ref Range: 1 6 - 2 6 mg/dL 1 7 1 7  1 6   Lipase Latest Ref Range: 73 - 393 u/L 397 (H) 436 (H) 1,090 (H) 1,246 (H)   Cholesterol Latest Ref Range: 50 - 200 mg/dL  134     Triglycerides Latest Ref Range: <=150 mg/dL  57     HDL Latest Ref Range: 40 - 60 mg/dL  21 (L)     LDL Calculated Latest Ref Range: 0 - 100 mg/dL  102 (H)     WBC Latest Ref Range: 4 80 - 10 80 Thousand/uL  2 70 (L) 3 50 (L) 3 20 (L)   RBC Latest Ref Range: 4 70 - 6 10 Million/uL  2 99 (L) 3 46 (L) 2 88 (L)   Hemoglobin Latest Ref Range: 14 0 - 18 0 g/dL  9 4 (L) 10 7 (L) 9 0 (L)   Hematocrit Latest Ref Range: 42 0 - 52 0 %  28 7 (L) 33 3 (L) 27 5 (L)   MCV Latest Ref Range: 82 - 98 fL  96 96 96   MCH Latest Ref Range: 27 0 - 31 0 pg  31 4 (H) 31 1 (H) 31 1 (H)   MCHC Latest Ref Range: 31 4 - 37 4 g/dL  32 7 32 3 32 5   RDW Latest Ref Range: 11 6 - 15 1 %  19 8 (H) 19 8 (H) 20 3 (H)   Platelets Latest Ref Range: 130 - 400 Thousands/uL  83 (L) 108 (L) 108 (L)   MPV Latest Ref Range: 8 9 - 12 7 fL  8 1 (L) 8 8 (L) 9 0   Platelet Estimate Latest Ref Range: Adequate   Decreased (A)     Anisocytosis Unknown  Present         Assessment/Plan:    1    Acute pancreatitis most likely secondary to alcohol use, clinically patient appears to be doing relatively well and currently recovering from alcohol withdrawal, although his lipase has been noted to be increasing for the last 2 days    - will keep full liquid low-fat diet for now  - monitor lipase, abdominal exam  - alcohol cessation  - will discuss with attending about possibly repeat imaging versus following clinically

## 2018-04-06 VITALS
SYSTOLIC BLOOD PRESSURE: 112 MMHG | TEMPERATURE: 97.8 F | BODY MASS INDEX: 22.71 KG/M2 | HEIGHT: 72 IN | DIASTOLIC BLOOD PRESSURE: 76 MMHG | WEIGHT: 167.7 LBS | RESPIRATION RATE: 18 BRPM | HEART RATE: 56 BPM | OXYGEN SATURATION: 96 %

## 2018-04-06 LAB
ALBUMIN SERPL BCP-MCNC: 2.1 G/DL (ref 3.5–5)
ALP SERPL-CCNC: 155 U/L (ref 46–116)
ALT SERPL W P-5'-P-CCNC: 61 U/L (ref 12–78)
ANION GAP SERPL CALCULATED.3IONS-SCNC: 7 MMOL/L (ref 4–13)
AST SERPL W P-5'-P-CCNC: 56 U/L (ref 5–45)
BILIRUB SERPL-MCNC: 0.7 MG/DL (ref 0.2–1)
BUN SERPL-MCNC: 5 MG/DL (ref 5–25)
CALCIUM SERPL-MCNC: 8.7 MG/DL (ref 8.3–10.1)
CHLORIDE SERPL-SCNC: 104 MMOL/L (ref 100–108)
CO2 SERPL-SCNC: 28 MMOL/L (ref 21–32)
CREAT SERPL-MCNC: 0.73 MG/DL (ref 0.6–1.3)
ERYTHROCYTE [DISTWIDTH] IN BLOOD BY AUTOMATED COUNT: 20.2 % (ref 11.6–15.1)
GFR SERPL CREATININE-BSD FRML MDRD: 109 ML/MIN/1.73SQ M
GLUCOSE SERPL-MCNC: 80 MG/DL (ref 65–140)
HCT VFR BLD AUTO: 27.4 % (ref 42–52)
HGB BLD-MCNC: 8.9 G/DL (ref 14–18)
LIPASE SERPL-CCNC: 888 U/L (ref 73–393)
MCH RBC QN AUTO: 31 PG (ref 27–31)
MCHC RBC AUTO-ENTMCNC: 32.6 G/DL (ref 31.4–37.4)
MCV RBC AUTO: 95 FL (ref 82–98)
PLATELET # BLD AUTO: 121 THOUSANDS/UL (ref 130–400)
PMV BLD AUTO: 8.1 FL (ref 8.9–12.7)
POTASSIUM SERPL-SCNC: 3.4 MMOL/L (ref 3.5–5.3)
PROT SERPL-MCNC: 5.7 G/DL (ref 6.4–8.2)
RBC # BLD AUTO: 2.89 MILLION/UL (ref 4.7–6.1)
SODIUM SERPL-SCNC: 139 MMOL/L (ref 136–145)
WBC # BLD AUTO: 3.4 THOUSAND/UL (ref 4.8–10.8)

## 2018-04-06 PROCEDURE — 80053 COMPREHEN METABOLIC PANEL: CPT | Performed by: INTERNAL MEDICINE

## 2018-04-06 PROCEDURE — 99239 HOSP IP/OBS DSCHRG MGMT >30: CPT | Performed by: INTERNAL MEDICINE

## 2018-04-06 PROCEDURE — 85027 COMPLETE CBC AUTOMATED: CPT | Performed by: INTERNAL MEDICINE

## 2018-04-06 PROCEDURE — 83690 ASSAY OF LIPASE: CPT | Performed by: INTERNAL MEDICINE

## 2018-04-06 RX ORDER — POTASSIUM CHLORIDE 20 MEQ/1
40 TABLET, EXTENDED RELEASE ORAL ONCE
Status: COMPLETED | OUTPATIENT
Start: 2018-04-06 | End: 2018-04-06

## 2018-04-06 RX ORDER — CARBAMAZEPINE 100 MG/1
100 CAPSULE, EXTENDED RELEASE ORAL 2 TIMES DAILY
Qty: 60 CAPSULE | Refills: 0 | Status: SHIPPED | OUTPATIENT
Start: 2018-04-06 | End: 2018-05-31 | Stop reason: SDUPTHER

## 2018-04-06 RX ADMIN — CHLORDIAZEPOXIDE HYDROCHLORIDE 25 MG: 25 CAPSULE ORAL at 01:54

## 2018-04-06 RX ADMIN — Medication 100 MG: at 08:35

## 2018-04-06 RX ADMIN — Medication 1 TABLET: at 08:34

## 2018-04-06 RX ADMIN — FOLIC ACID 1 MG: 1 TABLET ORAL at 08:34

## 2018-04-06 RX ADMIN — CYANOCOBALAMIN TAB 500 MCG 1000 MCG: 500 TAB at 08:34

## 2018-04-06 RX ADMIN — POTASSIUM CHLORIDE 40 MEQ: 1500 TABLET, EXTENDED RELEASE ORAL at 10:54

## 2018-04-06 RX ADMIN — FAMOTIDINE 20 MG: 20 TABLET ORAL at 08:35

## 2018-04-06 RX ADMIN — Medication 200 MG: at 08:35

## 2018-04-06 RX ADMIN — SODIUM CHLORIDE, SODIUM LACTATE, POTASSIUM CHLORIDE, AND CALCIUM CHLORIDE 200 ML/HR: .6; .31; .03; .02 INJECTION, SOLUTION INTRAVENOUS at 05:15

## 2018-04-06 RX ADMIN — Medication 325 MG: at 08:34

## 2018-04-06 RX ADMIN — OXYCODONE HYDROCHLORIDE AND ACETAMINOPHEN 1500 MG: 500 TABLET ORAL at 08:34

## 2018-04-06 NOTE — DISCHARGE SUMMARY
Discharge- Devan Duffy 1970, 51 y o  male MRN: [id number]    Unit/Bed#: [address] [place] [address] Encounter: [id number]    Primary Care Provider: Sanya Sen MD   Date and time admitted to hospital: 3/31/2018  8:55 AM        * Acute pancreatitis without infection or necrosis   Assessment & Plan      Likely secondary to alcohol use  Lipase level continues to improve  Abdominal ultrasound showed hepatomegaly with fatty liver without any stones or biliary ductal dilatation  Patient's triglyceride levels were normal  Lipase level has increased from [phone]-->888  Continue aggressive IV hydration with LR at 200 mL/hour  Patient is tolerating low-fat diet without any further symptoms  Discharge home on low-fat diet        Elevated LFTs   Assessment & Plan    Patient noted to have elevated LFTs on lab work from December 2017 but appear worse during this admission  Likely due to alcohol abuse  Right upper quadrant ultrasound showed hepatomegaly with fatty change without any ductal dilatation  Tylenol, salicylate level low  hepatitis panel nonreactive  LFTs are normal  CT abdomen/pelvis showed hepatomegaly with prominent fatty infiltration and wedge-shaped hypoattenuation along the gallbladder  Occipital neuralgia   Assessment & Plan    Chronic in nature for patient but has worsened over the last 3 months  Patient has a neurostimulator in place  Likely also secondary to intractable migraine  Advised patient to follow up with neurologist and pain management doctor after discharge  Patient was started on verapamil  milligram p   O  daily and Magnesium oxide 400 milligram p   O  daily as verapamil can cause constipation    Plan is to start patient on carbamazepine 100 mg b i d  on April 9, 2018        Essential hypertension   Assessment & Plan    Patient does report high blood pressure previously but not on any medications as it was attributed to pain from his occipital neuralgia  Blood pressures have been stable currently on verapamil        Hyponatremia   Assessment & Plan    Likely hypovolemic hyponatremia  Sodium level is normal with IV hydration        Alcohol abuse   Assessment & Plan    Patient has been taking 2-3 shots of cognac every day for the last 2-4 months to help control his pain from occipital neurologia  No further signs of withdrawal  Advised on alcohol cessation        History of gastric cancer   Assessment & Plan    Status post gastrojejunostomy and then complete gastrectomy due to recurrence of tumor at the margins   Continue outpatient follow-up        Hypomagnesemia   Assessment & Plan    Resolved with repletion        Pancytopenia ([address] )   Assessment & Plan    Patient noted to have progressively worsening white count, platelet count and hemoglobin initially  Most likely secondary to dilution from IV hydration  Counts continued to remain stable                Resolved Problems  Date Reviewed: 4/6/2018          Resolved    High anion gap metabolic acidosis 6/6/5301     Resolved by  Winston Asencio MD          Consultations During Hospital Stay:  · Dr Tiarra Guevara     Outpatient Tests Requested:  · Follow-up with Dr Tiarra Guevara in 2 weeks,Dr Betsy Cortez in 4 weeks    Complications:  None    Reason for Admission:  Headache and abdominal pain    Hospital Course:     Fely Varghese is a 51 y o  male patient who originally presented to the hospital on 3/31/2018 due to headache for 3 months  Patient has history of occipital Neurology and has a neurostimulator in place  Patient also reports some generalized abdominal pain especially in the right upper quadrant and also dizziness  In the ED patient's workup revealed acute pancreatitis  Patient was admitted hospital and was treated with IV fluids pain management and patient was seen in consultation with GI  Patient also noted to have elevated LFTs which was thought to be secondary to alcohol    Patient was also seen in consultation with Neurology for persistent headaches and patient was diagnosed with possible intractable migraine along with occipital neurology a  Patient was started on verapamil  milligram p o  daily and magnesium oxide  Later patient's hepatitis panel and lipid panel were negative  Patient's right upper quadrant ultrasound showed fatty liver without any stones  Patient's abdominal pain continued to improve and patient's diet was slowly advanced  Later patient developed an episode of hallucinations and shakiness and was thought to be secondary to alcohol withdrawal   Patient was started on Librium withdrawal protocol and Ativan p r n , multivitamin, thiamine and folic acid  Later patient noted to have worsening lipase which again improved later  Patient did have drop in all cell lines which was thought to be secondary to dilution  Patient remained in a stable condition be discharged home on a low-fat diet follow-up with GI and Urology as outpatient  Please see above list of diagnoses and related plan for additional information  Condition at Discharge: stable     Discharge Day Visit / Exam:     Subjective:  Patient denies any abdominal pain, nausea, vomiting, shakiness  Patient is tolerating low-fat diet well  Vitals: Blood Pressure: 112/76 (04/06/18 0733)  Pulse: 56 (04/06/18 0733)  Temperature: 97 8 °F (36 6 °C) (04/06/18 0733)  Temp Source: Oral (04/05/18 2100)  Respirations: 18 (04/06/18 0733)  Height: 6' (182 9 cm) (04/02/18 1120)  Weight - Scale: 76 1 kg (167 lb 11 2 oz) (03/31/18 1216)  SpO2: 96 % (04/06/18 0733)  Exam:   Physical Exam   Constitutional: No distress  HENT:   Head: Normocephalic and atraumatic  Nose: Nose normal    Mouth/Throat: Oropharynx is clear and moist    Eyes: Conjunctivae and EOM are normal  Pupils are equal, round, and reactive to light  Neck: Normal range of motion  Neck supple  No JVD present  Cardiovascular: Normal rate, regular rhythm and normal heart sounds    Exam reveals no gallop and no friction rub  No murmur heard  Pulmonary/Chest: Effort normal and breath sounds normal  No respiratory distress  He has no wheezes  He has no rales  He exhibits no tenderness  Abdominal: Soft  Bowel sounds are normal  He exhibits no distension  There is no tenderness  There is no rebound and no guarding  Musculoskeletal: He exhibits no edema  Neurological: He is alert  No cranial nerve deficit  Skin: Skin is warm and dry  No rash noted  Psychiatric: He has a normal mood and affect  Discharge instructions/Information to patient and family:   See after visit summary for information provided to patient and family  Provisions for Follow-Up Care:  See after visit summary for information related to follow-up care and any pertinent home health orders  Disposition:     Home    For Discharges to [facility]:   · Not Applicable to this Patient - Not Applicable to this Patient    Planned Readmission: No     Discharge Statement:  I spent 40 minutes discharging the patient  This time was spent on the day of discharge  I had direct contact with the patient on the day of discharge  Greater than 50% of the total time was spent examining patient, answering all patient questions, arranging and discussing plan of care with patient as well as directly providing post-discharge instructions  Additional time then spent on discharge activities  Discharge Medications:  See after visit summary for reconciled discharge medications provided to patient and family        ** Please Note: This note has been constructed using a voice recognition system **

## 2018-04-06 NOTE — PLAN OF CARE
CONFUSION/THOUGHT DISTURBANCE     Thought disturbances (confusion, delirium, depression, dementia or psychosis) are managed to maintain or return to baseline mental status and functional level [address] Discharge to home or other facility with appropriate resources Progressing        DISCHARGE PLANNING - CARE MANAGEMENT     Discharge to post-acute care or home with appropriate resources Progressing        GASTROINTESTINAL - ADULT     Minimal or absence of nausea and/or vomiting Progressing     Maintains or returns to baseline bowel function Progressing     Maintains adequate nutritional intake Progressing        Knowledge Deficit     Patient/family/caregiver demonstrates understanding of disease process, treatment plan, medications, and discharge instructions Progressing        Nutrition/Hydration-ADULT     Nutrient/Hydration intake appropriate for improving, restoring or maintaining nutritional needs Progressing        PAIN - ADULT     Verbalizes/displays adequate comfort level or baseline comfort level Progressing        Potential for Falls     Patient will remain free of falls Progressing        SUBSTANCE USE/ABUSE     Will have no detox symptoms and will verbalize plan for changing substance-related behavior Progressing

## 2018-04-06 NOTE — PLAN OF CARE
CONFUSION/THOUGHT DISTURBANCE     Thought disturbances (confusion, delirium, depression, dementia or psychosis) are managed to maintain or return to baseline mental status and functional level Adequate for Discharge        DISCHARGE PLANNING     Discharge to home or other facility with appropriate resources Adequate for Discharge        DISCHARGE PLANNING - CARE MANAGEMENT     Discharge to post-acute care or home with appropriate resources Adequate for Discharge        GASTROINTESTINAL - ADULT     Minimal or absence of nausea and/or vomiting Adequate for Discharge     Maintains or returns to baseline bowel function Adequate for Discharge     Maintains adequate nutritional intake Adequate for Discharge        Knowledge Deficit     Patient/family/caregiver demonstrates understanding of disease process, treatment plan, medications, and discharge instructions Adequate for Discharge        Nutrition/Hydration-ADULT     Nutrient/Hydration intake appropriate for improving, restoring or maintaining nutritional needs Adequate for Discharge        PAIN - ADULT     Verbalizes/displays adequate comfort level or baseline comfort level Adequate for Discharge        Potential for Falls     Patient will remain free of falls Adequate for Discharge        SUBSTANCE USE/ABUSE     Will have no detox symptoms and will verbalize plan for changing substance-related behavior Adequate for Discharge

## 2018-04-06 NOTE — NURSING NOTE
Patient discharged to home, medication and discharge instructions given, patient verbalized understanding, follow-up appointments reviewed, IV removed, belongings gathered, patient ambulated off unit with spouse, gait was steady

## 2018-05-29 RX ORDER — CARBAMAZEPINE 200 MG/1
TABLET ORAL
COMMUNITY
Start: 2016-03-08 | End: 2018-05-31

## 2018-05-31 ENCOUNTER — OFFICE VISIT (OUTPATIENT)
Dept: NEUROLOGY | Facility: CLINIC | Age: 48
End: 2018-05-31

## 2018-05-31 VITALS
HEIGHT: 72 IN | BODY MASS INDEX: 23.84 KG/M2 | DIASTOLIC BLOOD PRESSURE: 88 MMHG | WEIGHT: 176 LBS | HEART RATE: 82 BPM | SYSTOLIC BLOOD PRESSURE: 124 MMHG

## 2018-05-31 DIAGNOSIS — G89.29 CHRONIC INTRACTABLE HEADACHE, UNSPECIFIED HEADACHE TYPE: Primary | ICD-10-CM

## 2018-05-31 DIAGNOSIS — R41.3 POOR SHORT TERM MEMORY: ICD-10-CM

## 2018-05-31 DIAGNOSIS — M54.81 OCCIPITAL NEURALGIA OF LEFT SIDE: ICD-10-CM

## 2018-05-31 DIAGNOSIS — G95.0 SYRINX (HCC): ICD-10-CM

## 2018-05-31 DIAGNOSIS — R42 LIGHTHEADEDNESS: ICD-10-CM

## 2018-05-31 DIAGNOSIS — R51.9 CHRONIC INTRACTABLE HEADACHE, UNSPECIFIED HEADACHE TYPE: Primary | ICD-10-CM

## 2018-05-31 DIAGNOSIS — G50.0 TRIGEMINAL NEURALGIA OF LEFT SIDE OF FACE: ICD-10-CM

## 2018-05-31 DIAGNOSIS — M50.90 CERVICAL DISC DISEASE: ICD-10-CM

## 2018-05-31 PROCEDURE — 99215 OFFICE O/P EST HI 40 MIN: CPT | Performed by: PSYCHIATRY & NEUROLOGY

## 2018-05-31 RX ORDER — MIDODRINE HYDROCHLORIDE 2.5 MG/1
2.5 TABLET ORAL 3 TIMES DAILY
Qty: 90 TABLET | Refills: 2 | Status: ON HOLD | OUTPATIENT
Start: 2018-05-31 | End: 2018-10-16 | Stop reason: CLARIF

## 2018-05-31 RX ORDER — CARBAMAZEPINE 100 MG/1
CAPSULE, EXTENDED RELEASE ORAL
Qty: 90 CAPSULE | Refills: 3 | Status: SHIPPED | OUTPATIENT
Start: 2018-05-31 | End: 2018-08-30 | Stop reason: ALTCHOICE

## 2018-05-31 NOTE — PROGRESS NOTES
Return NeuroOutpatient Note        Bridger Garcia  [id number]  51 y o   1970       Neurologic Problem (Neuralgia)        History obtained from:  Patient     HPI/Subjective:    Bridger Garcia is a 50 yo M with PMH of gastric cancer, occipital neuralgia, cervical syrinx presents as follow up for headaches  Patient has complicated history  He was first evaluated on 4/2/18  Per my previous history, he had neck injury in 2011 which resulted in disc displacement which was impinging on cervical spinal cord  3-4 years later he had surgical correction  Patient was experiencing left sided neck and head pain for a long time and and pain stimulator placed  It was working well at first for past few months patient's pain has returned  He had reported left sided neck, face and head burning, pins and needle sensation  He had described squeezing, pressured type of intracranial pain that is refractory to medications  He was getting headaches 25 headaches a month  During hospital encounter, we had placed him on tegretol 100mg bid for facial pain, and verapamil for headaches  He is doing better  His headaches are now 10/month  His facial pain is much better  Today he is happy that finally he's able to function and not in so much pain  Today he does report that he gets some lightheadedness with the medication  His heart is not racing as much like before  Change in weather can make him lightheaded  Standing from sitting position can make him lightheaded and be off balance  Patient suffers from insomnia  Initially, he was sleeping better  He has tried high dose melatonin, ambien and trazodone but none worked well  He has tried changing his life style, good sleep hyegine  He would like to be active but now takes three times the amount of time to finish task  He has poor short term memory for past 4 years       He had MRI brain a few years ago and it was negative       Past Medical History:   Diagnosis Date    Anemia  Balance problems     Cancer of stomach ([facility] [address] )     Hypertension     Insomnia     Occipital neuralgia     Other mechanical complication of implanted electronic neurostimulator of spinal cord electrode (lead), sequela     maybe not working   CarMax of spinal cord (HCC)     Vitamin D deficiency      Social History     Social History    Marital status: /Civil Union     Spouse name: N/A    Number of children: N/A    Years of education: N/A     Occupational History    Not on file  Social History Main Topics    Smoking status: Never Smoker    Smokeless tobacco: Never Used    Alcohol use No    Drug use: No    Sexual activity: Not on file     Other Topics Concern    Not on file     Social History Narrative    No narrative on file     Family History   Problem Relation Age of Onset    Hypertension Mother     Esophageal cancer Father     No Known Problems Brother      No Known Allergies  Current Outpatient Prescriptions on File Prior to Visit   Medication Sig Dispense Refill    Ascorbic Acid (VITAMIN C) 1000 MG tablet Take 2,000 mg by mouth daily      Cyanocobalamin (VITAMIN B 12 PO) Take by mouth      FERROUS SULFATE PO Take 220 mg by mouth daily      magnesium oxide (MAG-OX) 400 mg Take 1 tablet (400 mg total) by mouth daily  0    verapamil (CALAN-SR) 120 mg CR tablet Take 1 tablet (120 mg total) by mouth daily after dinner 30 tablet 0    [DISCONTINUED] carbamazepine (CARBATROL) 100 MG 12 hr capsule Take 1 capsule (100 mg total) by mouth 2 (two) times a day 60 capsule 0    traMADol (ULTRAM) 50 mg tablet Take 50 mg by mouth every 6 (six) hours as needed for moderate pain       No current facility-administered medications on file prior to visit  Review of Systems   Refer to positive review of systems in HPI     Constitutional- No fever  Eyes- No visual change  ENT- Hearing normal  CV- No chest pain  Resp- No Shortness of breath  GI- No diarrhea  - Bladder normal  MS- No Arthritis   Skin- No rash  Psych- No depression  Endo- No DM  Heme- No nodes    Vitals:    05/31/18 1016   BP: 124/88   BP Location: Left arm   Patient Position: Sitting   Pulse: 82   Weight: 79 8 kg (176 lb)   Height: 6' (1 829 m)       PHYSICAL EXAM:  Appearance: No Acute Distress  Ophthalmoscopic: Disc Flat, Normal fundus  Mental status:  Orientation: Awake, Alert, and Orientedx3  Memory: Registation 3/3 Recall 3/3  Attention: normal  Knowledge: good  Language: No aphasia  Speech: No dysarthria  Cranial Nerves:  2 No Visual Defect on Confrontation, Pupils round, equal, reactive to light  3,4,6 Extraocular Movements Intact, no nystagmus  5 Facial Sensation Intact  7 No facial asymmetry  8 Intact hearing  9,10 Palate symmetric, normal gag  11 Good shoulder shrug  12 Tongue Midline  Gait: Stable  Coordination: No ataxia with finger to nose testing, and heel to shin  Sensory: decreased to light touch and  pinprick, vibration, and joint position  Muscle Tone: Normal              Muscle exam:  Arm Right Left Leg Right Left   Deltoid 5/5 4+/5 Iliopsoas 5/5 5/5   Biceps 5/5 4+/5 Quads 5/5 5/5   Triceps 5/5 5/5 Hamstrings 5/5 5/5   Wrist Extension 5/5 5/5 Ankle Dorsi Flexion 5/5 5/5   Wrist Flexion 5/5 5/5 Ankle Plantar Flexion 5/5 5/5   Interossei 5/5 4+/5 Ankle Eversion 5/5 5/5   APB 5/5 5/5 Ankle Inversion 5/5 5/5       Reflexes   RJ BJ TJ KJ AJ Plantars Christianson's   Right 2+ 2+ 2+ 2+ 2+ Downgoing Not present   Left 2+ 2+ 2+ 2+ 2+ Downgoing Not present     Personal review of  Labs:                    Diagnoses and all orders for this visit:        1  Chronic intractable headache, unspecified headache type     2  Occipital neuralgia of left side  carbamazepine (CARBATROL) 100 MG 12 hr capsule   3  Trigeminal neuralgia of left side of face  carbamazepine (CARBATROL) 100 MG 12 hr capsule   4  Lightheadedness  midodrine (PROAMATINE) 2 5 mg tablet   5  Syrinx ([address] )     6  Cervical disc disease     7   Poor short term memory Patient is doing better in terms of his headaches and facial, head pain  Will increase carbatrol to 100mg am and 200mg pm    Will resume prior verapamil  Will add midodrine to treat lightheadedness  We can't lower verapamil because his headaches may return  For short term memory loss, will refer him for neuro psych  He will resume prior vitamins  Counseling Documentation:  The patient and/or patient's family were  counseled regarding diagnostic results  Instructions for management,risk factor reductions,prognosis of disease were discussed  Patient and family were educated regarding impressions,risks and benefits of treatment options,importance of compliance with treatment        Total time of encounter:  40 min  More than 50% of the time was used in counseling and/or coordination of care  Extent of counseling and/or coordination of care        MD [facility]  [facility] [address] [place] [address]  [phone]

## 2018-08-20 ENCOUNTER — TELEPHONE (OUTPATIENT)
Dept: NEUROLOGY | Facility: CLINIC | Age: 48
End: 2018-08-20

## 2018-08-20 NOTE — TELEPHONE ENCOUNTER
The patient called today stating that he believes he is having an allergic reaction to Carbamazepine 100 mg 12 hour cap as he has been experiencing eye and lip swelling and a feeling of someone pouring acid under his skin on his face  He also has developed a rash on his arms  He originally wasn't sure that it was this medication as several other doctors started new meds  However, he is discontinuing now and had to go to [employer] and  Benadryl

## 2018-08-30 ENCOUNTER — OFFICE VISIT (OUTPATIENT)
Dept: NEUROLOGY | Facility: CLINIC | Age: 48
End: 2018-08-30

## 2018-08-30 VITALS
HEART RATE: 90 BPM | HEIGHT: 72 IN | BODY MASS INDEX: 23.84 KG/M2 | WEIGHT: 176 LBS | DIASTOLIC BLOOD PRESSURE: 88 MMHG | SYSTOLIC BLOOD PRESSURE: 128 MMHG

## 2018-08-30 DIAGNOSIS — M79.2 NEURALGIA: Primary | ICD-10-CM

## 2018-08-30 DIAGNOSIS — R41.3 SHORT-TERM MEMORY LOSS: ICD-10-CM

## 2018-08-30 DIAGNOSIS — G43.911 INTRACTABLE MIGRAINE WITH STATUS MIGRAINOSUS, UNSPECIFIED MIGRAINE TYPE: ICD-10-CM

## 2018-08-30 DIAGNOSIS — G95.0 SYRINX (HCC): ICD-10-CM

## 2018-08-30 DIAGNOSIS — M50.90 CERVICAL DISC DISEASE: ICD-10-CM

## 2018-08-30 PROCEDURE — 99215 OFFICE O/P EST HI 40 MIN: CPT | Performed by: PSYCHIATRY & NEUROLOGY

## 2018-08-30 RX ORDER — OXCARBAZEPINE 150 MG/1
150 TABLET, FILM COATED ORAL 2 TIMES DAILY
Qty: 60 TABLET | Refills: 1 | Status: SHIPPED | OUTPATIENT
Start: 2018-08-30 | End: 2019-04-14

## 2018-08-30 NOTE — PROGRESS NOTES
Return NeuroOutpatient Note        Louie Turk  [id number]  51 y o   1970       Headache        History obtained from:  Patient     HPI/Subjective:    Louie Turk is a 50 yo M with PMH of gastric cancer, occipital neuralgia, cervical syrinx presents as follow up for headaches  Patient has complicated history  He was first evaluated on 4/2/18  Per my previous history, he had neck injury in 2011 which resulted in disc displacement which was impinging on cervical spinal cord  3-4 years later he had surgical correction  Patient was experiencing left sided neck and head pain for a long time and and pain stimulator placed  It was working well at first for past few months patient's pain has returned  He had reported left sided neck, face and head burning, pins and needle sensation  He had described squeezing, pressured type of intracranial pain that is refractory to medications  He was getting headaches 25 headaches a month  During hospital encounter, we had placed him on tegretol 100mg bid for facial pain, and verapamil for headaches  At last visit, he was doing better in terms of his headaches  On Aug 20th, he had called to report possible allergic reaction with sunlight exposure to lip and eye swelling  When he stopped tegretol, his sxs resolved  He had been taking it for few months prior to that without a problem  He was in swimming pool for 6-7 hrs  He says he would like to try it again as it was helping  For lightheadedness, he was placed on midodrine  Today, he says his left side pain     Patient suffers from insomnia  Initially, he was sleeping better  He has tried high dose melatonin, ambien and trazodone but none worked well  He has tried changing his life style, good sleep hyegine  He would like to be active but now takes three times the amount of time to finish task  He has poor short term memory for past 4 years   We had referred him for neuropsych testing but he hasn't done it yet       He had MRI brain a few years ago and it was negative  Past Medical History:   Diagnosis Date    Anemia     Balance problems     Cancer of stomach ([address] )     Hypertension     Insomnia     Occipital neuralgia     Other mechanical complication of implanted electronic neurostimulator of spinal cord electrode (lead), sequela     maybe not working   CarMax of spinal cord (HCC)     Vitamin D deficiency      Social History     Social History    Marital status: /Civil Union     Spouse name: N/A    Number of children: N/A    Years of education: N/A     Occupational History    Not on file  Social History Main Topics    Smoking status: Former Smoker    Smokeless tobacco: Never Used    Alcohol use No    Drug use: No    Sexual activity: Not on file     Other Topics Concern    Not on file     Social History Narrative    No narrative on file     Family History   Problem Relation Age of Onset    Hypertension Mother     Esophageal cancer Father     No Known Problems Brother      Allergies   Allergen Reactions    Carbamazepine Rash, Eye Swelling and Facial Swelling     Current Outpatient Prescriptions on File Prior to Visit   Medication Sig Dispense Refill    Ascorbic Acid (VITAMIN C) 1000 MG tablet Take 2,000 mg by mouth daily      Cyanocobalamin (VITAMIN B 12 PO) Take by mouth      FERROUS SULFATE PO Take 220 mg by mouth daily      magnesium oxide (MAG-OX) 400 mg Take 1 tablet (400 mg total) by mouth daily  0    midodrine (PROAMATINE) 2 5 mg tablet Take 1 tablet (2 5 mg total) by mouth 3 (three) times a day 90 tablet 2    [DISCONTINUED] verapamil (CALAN-SR) 120 mg CR tablet Take 1 tablet (120 mg total) by mouth daily after dinner 30 tablet 0    traMADol (ULTRAM) 50 mg tablet Take 50 mg by mouth every 6 (six) hours as needed for moderate pain      [DISCONTINUED] carbamazepine (CARBATROL) 100 MG 12 hr capsule Take 1 capsule in morning and 2 capsules in evening   (Patient not taking: Reported on 8/30/2018 ) 90 capsule 3     No current facility-administered medications on file prior to visit  Review of Systems   Refer to positive review of systems in HPI     Constitutional- No fever  Eyes- No visual change  ENT- Hearing normal  CV- No chest pain  Resp- No Shortness of breath  GI- No diarrhea  - Bladder normal  MS- No Arthritis   Skin- No rash  Psych- No depression  Endo- No DM  Heme- No nodes    Vitals:    08/30/18 1200   BP: 128/88   BP Location: Left arm   Patient Position: Sitting   Cuff Size: Standard   Pulse: 90   Weight: 79 8 kg (176 lb)   Height: 6' (1 829 m)       PHYSICAL EXAM:  Appearance: No Acute Distress  Ophthalmoscopic: Disc Flat, Normal fundus  Mental status:  Orientation: Awake, Alert, and Orientedx3  Memory: Registation 3/3 Recall 3/3  Attention: normal  Knowledge: good  Language: No aphasia  Speech: No dysarthria  Cranial Nerves:  2 No Visual Defect on Confrontation, Pupils round, equal, reactive to light  3,4,6 Extraocular Movements Intact, no nystagmus  5 Facial Sensation Intact  7 No facial asymmetry  8 Intact hearing  9,10 Palate symmetric, normal gag  11 Good shoulder shrug  12 Tongue Midline  Gait: Stable  Coordination: No ataxia with finger to nose testing, and heel to shin  Sensory: decreased to light touch and  pinprick, vibration, and joint position  Muscle Tone: Normal              Muscle exam:  Arm Right Left Leg Right Left   Deltoid 5/5 4+/5 Iliopsoas 5/5 5/5   Biceps 5/5 4+/5 Quads 5/5 5/5   Triceps 5/5 5/5 Hamstrings 5/5 5/5   Wrist Extension 5/5 5/5 Ankle Dorsi Flexion 5/5 5/5   Wrist Flexion 5/5 5/5 Ankle Plantar Flexion 5/5 5/5   Interossei 5/5 4+/5 Ankle Eversion 5/5 5/5   APB 5/5 5/5 Ankle Inversion 5/5 5/5       Reflexes   RJ BJ TJ KJ AJ Plantars Christianson's   Right 2+ 2+ 2+ 2+ 2+ Downgoing Not present   Left 2+ 2+ 2+ 2+ 2+ Downgoing Not present     Personal review of  Labs:                    Diagnoses and all orders for this visit: 1  Neuralgia  OXcarbazepine (TRILEPTAL) 150 mg tablet   2  Intractable migraine with status migrainosus, unspecified migraine type  verapamil (CALAN-SR) 120 mg CR tablet   3  Cervical disc disease  MRI cervical spine with and without contrast   4  Syrinx ([address] )  MRI cervical spine with and without contrast   5  Short-term memory loss         For his neuralgia, will place him on trileptal    Will increase verapamil to 120mg bid  Will arrange for Aimovig 140mg/monthly  Discussed getting MRI cervical spine to see if there has been any progression since last study  Will resume midodrine to treat lightheadedness  For short term memory loss, will again refer him for neuro psych  He will resume prior vitamins  Counseling Documentation:  The patient and/or patient's family were  counseled regarding diagnostic results  Instructions for management,risk factor reductions,prognosis of disease were discussed  Patient and family were educated regarding impressions,risks and benefits of treatment options,importance of compliance with treatment        Total time of encounter:  40 min  More than 50% of the time was used in counseling and/or coordination of care  Extent of counseling and/or coordination of care        MD [facility]  [address]  [place] [address]  [phone]

## 2018-09-27 DIAGNOSIS — N28.9 RENAL FUNCTION IMPAIRMENT: Primary | ICD-10-CM

## 2018-10-16 ENCOUNTER — APPOINTMENT (EMERGENCY)
Dept: RADIOLOGY | Facility: HOSPITAL | Age: 48
End: 2018-10-16
Payer: COMMERCIAL

## 2018-10-16 ENCOUNTER — HOSPITAL ENCOUNTER (INPATIENT)
Facility: HOSPITAL | Age: 48
LOS: 1 days | Discharge: HOME | End: 2018-10-17
Attending: EMERGENCY MEDICINE | Admitting: STUDENT IN AN ORGANIZED HEALTH CARE EDUCATION/TRAINING PROGRAM
Payer: COMMERCIAL

## 2018-10-16 DIAGNOSIS — F10.929 ACUTE ALCOHOL INTOXICATION (HCC): Primary | ICD-10-CM

## 2018-10-16 DIAGNOSIS — F32.9 MAJOR DEPRESSIVE DISORDER WITH SINGLE EPISODE: ICD-10-CM

## 2018-10-16 DIAGNOSIS — K86.1 CHRONIC PANCREATITIS (HCC): ICD-10-CM

## 2018-10-16 PROBLEM — F10.10 ALCOHOL ABUSE: Status: RESOLVED | Noted: 2018-03-31 | Resolved: 2018-10-16

## 2018-10-16 PROBLEM — R79.89 ELEVATED LFTS: Status: RESOLVED | Noted: 2018-03-31 | Resolved: 2018-10-16

## 2018-10-16 PROBLEM — R74.8 ABNORMAL TRANSAMINASES: Status: ACTIVE | Noted: 2018-10-16

## 2018-10-16 PROBLEM — F10.920 ALCOHOLIC INTOXICATION WITHOUT COMPLICATION (HCC): Status: ACTIVE | Noted: 2018-10-16

## 2018-10-16 PROBLEM — R74.8 ABNORMAL SERUM LEVEL OF LIPASE: Status: ACTIVE | Noted: 2018-10-16

## 2018-10-16 LAB
ALBUMIN SERPL BCP-MCNC: 3.5 G/DL (ref 3.5–5)
ALP SERPL-CCNC: 193 U/L (ref 46–116)
ALT SERPL W P-5'-P-CCNC: 176 U/L (ref 12–78)
ANION GAP SERPL CALCULATED.3IONS-SCNC: 13 MMOL/L (ref 4–13)
APTT PPP: 25 SECONDS (ref 24–36)
AST SERPL W P-5'-P-CCNC: 261 U/L (ref 5–45)
BASOPHILS # BLD AUTO: 0.06 THOUSANDS/ΜL (ref 0–0.1)
BASOPHILS NFR BLD AUTO: 1 % (ref 0–1)
BILIRUB SERPL-MCNC: 0.7 MG/DL (ref 0.2–1)
BUN SERPL-MCNC: 9 MG/DL (ref 5–25)
CALCIUM SERPL-MCNC: 8.1 MG/DL (ref 8.3–10.1)
CHLORIDE SERPL-SCNC: 106 MMOL/L (ref 100–108)
CO2 SERPL-SCNC: 26 MMOL/L (ref 21–32)
CREAT SERPL-MCNC: 0.66 MG/DL (ref 0.6–1.3)
EOSINOPHIL # BLD AUTO: 0.02 THOUSAND/ΜL (ref 0–0.61)
EOSINOPHIL NFR BLD AUTO: 1 % (ref 0–6)
ERYTHROCYTE [DISTWIDTH] IN BLOOD BY AUTOMATED COUNT: 15.6 % (ref 11.6–15.1)
ETHANOL SERPL-MCNC: 482 MG/DL (ref 0–3)
GFR SERPL CREATININE-BSD FRML MDRD: 114 ML/MIN/1.73SQ M
GLUCOSE SERPL-MCNC: 99 MG/DL (ref 65–140)
HCT VFR BLD AUTO: 36 % (ref 36.5–49.3)
HGB BLD-MCNC: 11.6 G/DL (ref 12–17)
IMM GRANULOCYTES # BLD AUTO: 0.01 THOUSAND/UL (ref 0–0.2)
IMM GRANULOCYTES NFR BLD AUTO: 0 % (ref 0–2)
INR PPP: 0.98 (ref 0.86–1.16)
LIPASE SERPL-CCNC: 854 U/L (ref 73–393)
LYMPHOCYTES # BLD AUTO: 2.41 THOUSANDS/ΜL (ref 0.6–4.47)
LYMPHOCYTES NFR BLD AUTO: 56 % (ref 14–44)
MCH RBC QN AUTO: 33.5 PG (ref 26.8–34.3)
MCHC RBC AUTO-ENTMCNC: 32.2 G/DL (ref 31.4–37.4)
MCV RBC AUTO: 104 FL (ref 82–98)
MONOCYTES # BLD AUTO: 0.5 THOUSAND/ΜL (ref 0.17–1.22)
MONOCYTES NFR BLD AUTO: 12 % (ref 4–12)
NEUTROPHILS # BLD AUTO: 1.29 THOUSANDS/ΜL (ref 1.85–7.62)
NEUTS SEG NFR BLD AUTO: 30 % (ref 43–75)
NRBC BLD AUTO-RTO: 0 /100 WBCS
PLATELET # BLD AUTO: 299 THOUSANDS/UL (ref 149–390)
PMV BLD AUTO: 8.8 FL (ref 8.9–12.7)
POTASSIUM SERPL-SCNC: 3.5 MMOL/L (ref 3.5–5.3)
PROT SERPL-MCNC: 7.7 G/DL (ref 6.4–8.2)
PROTHROMBIN TIME: 10.3 SECONDS (ref 9.4–11.7)
RBC # BLD AUTO: 3.46 MILLION/UL (ref 3.88–5.62)
SODIUM SERPL-SCNC: 145 MMOL/L (ref 136–145)
WBC # BLD AUTO: 4.29 THOUSAND/UL (ref 4.31–10.16)

## 2018-10-16 PROCEDURE — 93005 ELECTROCARDIOGRAM TRACING: CPT

## 2018-10-16 PROCEDURE — 85730 THROMBOPLASTIN TIME PARTIAL: CPT | Performed by: EMERGENCY MEDICINE

## 2018-10-16 PROCEDURE — 99285 EMERGENCY DEPT VISIT HI MDM: CPT

## 2018-10-16 PROCEDURE — 96361 HYDRATE IV INFUSION ADD-ON: CPT

## 2018-10-16 PROCEDURE — 70450 CT HEAD/BRAIN W/O DYE: CPT

## 2018-10-16 PROCEDURE — 83690 ASSAY OF LIPASE: CPT | Performed by: EMERGENCY MEDICINE

## 2018-10-16 PROCEDURE — 36415 COLL VENOUS BLD VENIPUNCTURE: CPT | Performed by: EMERGENCY MEDICINE

## 2018-10-16 PROCEDURE — 85610 PROTHROMBIN TIME: CPT | Performed by: EMERGENCY MEDICINE

## 2018-10-16 PROCEDURE — 99219 PR INITIAL OBSERVATION CARE/DAY 50 MINUTES: CPT | Performed by: NURSE PRACTITIONER

## 2018-10-16 PROCEDURE — 87081 CULTURE SCREEN ONLY: CPT | Performed by: NURSE PRACTITIONER

## 2018-10-16 PROCEDURE — 85025 COMPLETE CBC W/AUTO DIFF WBC: CPT | Performed by: EMERGENCY MEDICINE

## 2018-10-16 PROCEDURE — 94762 N-INVAS EAR/PLS OXIMTRY CONT: CPT

## 2018-10-16 PROCEDURE — 96360 HYDRATION IV INFUSION INIT: CPT

## 2018-10-16 PROCEDURE — 80320 DRUG SCREEN QUANTALCOHOLS: CPT | Performed by: EMERGENCY MEDICINE

## 2018-10-16 PROCEDURE — 80053 COMPREHEN METABOLIC PANEL: CPT | Performed by: EMERGENCY MEDICINE

## 2018-10-16 RX ORDER — BUTALBITAL, ASPIRIN, AND CAFFEINE 50; 325; 40 MG/1; MG/1; MG/1
1 CAPSULE ORAL 3 TIMES DAILY PRN
COMMUNITY

## 2018-10-16 RX ORDER — FERROUS SULFATE 325(65) MG
325 TABLET ORAL DAILY
Status: DISCONTINUED | OUTPATIENT
Start: 2018-10-17 | End: 2018-10-17 | Stop reason: HOSPADM

## 2018-10-16 RX ORDER — OXCARBAZEPINE 150 MG/1
150 TABLET, FILM COATED ORAL 2 TIMES DAILY
Status: DISCONTINUED | OUTPATIENT
Start: 2018-10-16 | End: 2018-10-17 | Stop reason: HOSPADM

## 2018-10-16 RX ORDER — ACETAMINOPHEN 325 MG/1
650 TABLET ORAL ONCE
Status: DISCONTINUED | OUTPATIENT
Start: 2018-10-16 | End: 2018-10-17 | Stop reason: HOSPADM

## 2018-10-16 RX ORDER — FOLIC ACID 1 MG/1
1 TABLET ORAL DAILY
Status: DISCONTINUED | OUTPATIENT
Start: 2018-10-17 | End: 2018-10-17 | Stop reason: HOSPADM

## 2018-10-16 RX ORDER — SODIUM CHLORIDE 9 MG/ML
125 INJECTION, SOLUTION INTRAVENOUS CONTINUOUS
Status: DISCONTINUED | OUTPATIENT
Start: 2018-10-16 | End: 2018-10-17 | Stop reason: HOSPADM

## 2018-10-16 RX ORDER — SODIUM CHLORIDE 9 MG/ML
125 INJECTION, SOLUTION INTRAVENOUS CONTINUOUS
Status: DISCONTINUED | OUTPATIENT
Start: 2018-10-16 | End: 2018-10-16

## 2018-10-16 RX ORDER — ONDANSETRON 2 MG/ML
4 INJECTION INTRAMUSCULAR; INTRAVENOUS EVERY 6 HOURS PRN
Status: DISCONTINUED | OUTPATIENT
Start: 2018-10-16 | End: 2018-10-17 | Stop reason: HOSPADM

## 2018-10-16 RX ORDER — ASCORBIC ACID 500 MG
2000 TABLET ORAL DAILY
Status: DISCONTINUED | OUTPATIENT
Start: 2018-10-17 | End: 2018-10-17 | Stop reason: HOSPADM

## 2018-10-16 RX ORDER — CHOLECALCIFEROL (VITAMIN D3) 125 MCG
1000 CAPSULE ORAL DAILY
Status: DISCONTINUED | OUTPATIENT
Start: 2018-10-17 | End: 2018-10-17 | Stop reason: HOSPADM

## 2018-10-16 RX ORDER — THIAMINE MONONITRATE (VIT B1) 100 MG
100 TABLET ORAL DAILY
Status: DISCONTINUED | OUTPATIENT
Start: 2018-10-17 | End: 2018-10-17 | Stop reason: HOSPADM

## 2018-10-16 RX ADMIN — OXCARBAZEPINE 150 MG: 150 TABLET ORAL at 21:18

## 2018-10-16 RX ADMIN — SODIUM CHLORIDE 100 ML/HR: 0.9 INJECTION, SOLUTION INTRAVENOUS at 21:18

## 2018-10-16 RX ADMIN — VERAPAMIL HYDROCHLORIDE 120 MG: 120 TABLET, FILM COATED, EXTENDED RELEASE ORAL at 21:18

## 2018-10-16 RX ADMIN — SODIUM CHLORIDE 1000 ML: 0.9 INJECTION, SOLUTION INTRAVENOUS at 16:58

## 2018-10-16 NOTE — ED NOTES
Sat 90-91 % RA   Awakens easily   ETOH 482  Physician aware    O2 at [facility], RN  10/16/18 0630

## 2018-10-16 NOTE — ED PROVIDER NOTES
History  Chief Complaint   Patient presents with    Altered Mental Status     As per naun, pt found at home by significant other with an empty vodka by his side and pt was unresponsive but not passed put as per wife  Patient is a 50-year-old male with multiple medical issues who was found by his wife unresponsive on the bed, she states the patient eyes were open but was not able to follow any commands  Patient had empty vodka bottle next to him  Patient's wife states she saw him normal 3 hours prior to this  Talking to the wife the patient is now self medicating for his chronic pain issues with alcohol any seems to be now having alcoholic addiction  Patient is awake he is able follow commands he is not offering any physical complaints this time except as chronic headaches that is being worked up for by multiple medical doctors  Patient denies any chest pain, no nausea, no vomiting, no diarrhea, no abdominal pain  Patient admits to drinking, he denies taking any other substances  Prior to Admission Medications   Prescriptions Last Dose Informant Patient Reported? Taking?    Ascorbic Acid (VITAMIN C) 1000 MG tablet Past Week at Unknown time  Yes Yes   Sig: Take 2,000 mg by mouth daily   Cyanocobalamin (VITAMIN B 12 PO) Past Week at Unknown time  Yes Yes   Sig: Take by mouth   FERROUS SULFATE PO Past Week at Unknown time Self Yes Yes   Sig: Take 220 mg by mouth daily   OXcarbazepine (TRILEPTAL) 150 mg tablet Past Week at Unknown time  No Yes   Sig: Take 1 tablet (150 mg total) by mouth 2 (two) times a day   butalbital-aspirin-caffeine (FIORINAL) -40 mg per capsule Past Month at Unknown time Spouse/Significant Other Yes Yes   Sig: Take 1 capsule by mouth 3 (three) times a day as needed for headaches   magnesium oxide (MAG-OX) 400 mg Past Week at Unknown time  No Yes   Sig: Take 1 tablet (400 mg total) by mouth daily   midodrine (PROAMATINE) 2 5 mg tablet Unknown at Unknown time  No No Sig: Take 1 tablet (2 5 mg total) by mouth 3 (three) times a day   verapamil (CALAN-SR) 120 mg CR tablet 10/16/2018 at Unknown time  No Yes   Sig: Take 1 tablet (120 mg total) by mouth 2 (two) times a day      Facility-Administered Medications: None       Past Medical History:   Diagnosis Date    Anemia     Balance problems     Cancer of stomach ([address] )     Hypertension     Insomnia     Occipital neuralgia     Other mechanical complication of implanted electronic neurostimulator of spinal cord electrode (lead), sequela     maybe not working   CarMax of spinal cord (HCC)     Vitamin D deficiency        Past Surgical History:   Procedure Laterality Date    ABDOMINAL SURGERY      complete gastrectomy    BACK SURGERY      cervical discectomy    GASTRECTOMY         Family History   Problem Relation Age of Onset    Hypertension Mother     Esophageal cancer Father     No Known Problems Brother      I have reviewed and agree with the history as documented  Social History   Substance Use Topics    Smoking status: Former Smoker    Smokeless tobacco: Never Used    Alcohol use Yes      Comment: occasionally         Review of Systems   Constitutional: Negative for chills and fever  HENT: Negative for facial swelling and trouble swallowing  Respiratory: Negative for chest tightness and shortness of breath  Cardiovascular: Negative for chest pain and leg swelling  Gastrointestinal: Negative for abdominal pain, nausea and vomiting  Genitourinary: Negative for dysuria and flank pain  Musculoskeletal: Positive for back pain and neck pain  Skin: Negative  Neurological: Positive for headaches  Negative for weakness and numbness  Hematological: Negative  Psychiatric/Behavioral: Negative  Physical Exam  Physical Exam   Constitutional: He is oriented to person, place, and time  He appears well-developed and well-nourished  He is easily aroused  No distress     HENT:   Head: Normocephalic and atraumatic  Eyes: Pupils are equal, round, and reactive to light  EOM are normal    Neck: Normal range of motion  Cardiovascular: Normal rate and regular rhythm  Pulmonary/Chest: Effort normal and breath sounds normal  No respiratory distress  Abdominal: Soft  He exhibits no distension  There is no tenderness  Musculoskeletal: Normal range of motion  He exhibits no edema  Neurological: He is alert, oriented to person, place, and time and easily aroused  No cranial nerve deficit  Skin: Skin is warm and dry  Psychiatric: His speech is slurred  Nursing note and vitals reviewed        Vital Signs  ED Triage Vitals [10/16/18 1635]   Temperature Pulse Respirations Blood Pressure SpO2   97 6 °F (36 4 °C) 94 18 (!) 161/107 94 %      Temp Source Heart Rate Source Patient Position - Orthostatic VS BP Location FiO2 (%)   Tympanic Monitor Lying Right arm --      Pain Score       No Pain           Vitals:    10/16/18 1815 10/16/18 1830 10/16/18 1845 10/16/18 1915   BP: 122/79 148/93 139/91 120/74   Pulse: 74 80 74 76   Patient Position - Orthostatic VS:           Visual Acuity  Visual Acuity      Most Recent Value   L Pupil Size (mm)  4   R Pupil Size (mm)  4          ED Medications  Medications   sodium chloride 0 9 % bolus 1,000 mL (0 mL Intravenous Stopped 10/16/18 1941)       Diagnostic Studies  Results Reviewed     Procedure Component Value Units Date/Time    Ethanol [[id number]]  (Abnormal) Collected:  10/16/18 1656    Lab Status:  Final result Specimen:  Blood from Arm, Left Updated:  10/16/18 1723     Ethanol Lvl 482 (H) mg/dL     Lipase [[id number]]  (Abnormal) Collected:  10/16/18 1656    Lab Status:  Final result Specimen:  Blood from Arm, Left Updated:  10/16/18 1716     Lipase 854 (H) u/L     Comprehensive metabolic panel [[id number]]  (Abnormal) Collected:  10/16/18 1656    Lab Status:  Final result Specimen:  Blood from Arm, Left Updated:  10/16/18 1716     Sodium 145 mmol/L      Potassium 3 5 mmol/L      Chloride 106 mmol/L      CO2 26 mmol/L      ANION GAP 13 mmol/L      BUN 9 mg/dL      Creatinine 0 66 mg/dL      Glucose 99 mg/dL      Calcium 8 1 (L) mg/dL       (H) U/L       (H) U/L      Alkaline Phosphatase 193 (H) U/L      Total Protein 7 7 g/dL      Albumin 3 5 g/dL      Total Bilirubin 0 70 mg/dL      eGFR 114 ml/min/1 73sq m     Narrative:         National Kidney Disease Education Program recommendations are as follows:  GFR calculation is accurate only with a steady state creatinine  Chronic Kidney disease less than 60 ml/min/1 73 sq  meters  Kidney failure less than 15 ml/min/1 73 sq  meters      Protime-INR [[id number]]  (Normal) Collected:  10/16/18 1656    Lab Status:  Final result Specimen:  Blood from Arm, Left Updated:  10/16/18 1715     Protime 10 3 seconds      INR 0 98    APTT [[id number]]  (Normal) Collected:  10/16/18 1656    Lab Status:  Final result Specimen:  Blood from Arm, Left Updated:  10/16/18 1715     PTT 25 seconds     CBC and differential [[id number]]  (Abnormal) Collected:  10/16/18 1656    Lab Status:  Final result Specimen:  Blood from Arm, Left Updated:  10/16/18 1700     WBC 4 29 (L) Thousand/uL      RBC 3 46 (L) Million/uL      Hemoglobin 11 6 (L) g/dL      Hematocrit 36 0 (L) %       (H) fL      MCH 33 5 pg      MCHC 32 2 g/dL      RDW 15 6 (H) %      MPV 8 8 (L) fL      Platelets 230 Thousands/uL      nRBC 0 /100 WBCs      Neutrophils Relative 30 (L) %      Immat GRANS % 0 %      Lymphocytes Relative 56 (H) %      Monocytes Relative 12 %      Eosinophils Relative 1 %      Basophils Relative 1 %      Neutrophils Absolute 1 29 (L) Thousands/µL      Immature Grans Absolute 0 01 Thousand/uL      Lymphocytes Absolute 2 41 Thousands/µL      Monocytes Absolute 0 50 Thousand/µL      Eosinophils Absolute 0 02 Thousand/µL      Basophils Absolute 0 06 Thousands/µL                  CT head without contrast   Final Result by Leidy Brown DO (10/16 1724)   No acute intracranial abnormality  Workstation performed: [id number]                    Procedures  Procedures       Phone Contacts  ED Phone Contact    ED Course                               MDM  Number of Diagnoses or Management Options  Acute alcohol intoxication ([facility] ):   Chronic pancreatitis [facility]):   Diagnosis management comments: Because the patient's eye alcohol level at the time of take for him to be sober he is going to be put into the hospital for observation and possibly get some help with detox or rehab  Patient wife state understanding and agreement the assessment plan  Amount and/or Complexity of Data Reviewed  Clinical lab tests: ordered and reviewed  Tests in the radiology section of CPT®: ordered and reviewed      CritCare Time    Disposition  Final diagnoses:   Acute alcohol intoxication ([facility] )   Chronic pancreatitis ([facility] )     Time reflects when diagnosis was documented in both MDM as applicable and the Disposition within this note     Time User Action Codes Description Comment    10/16/2018  6:43 PM Joce Gotti Add [F10 929] Acute alcohol intoxication ([facility] )     10/16/2018  6:43 PM Joce Gotti Add [K86 1] Chronic pancreatitis [facility])       ED Disposition     ED Disposition Condition Comment    Admit  Case was discussed with Dr Eddie Romero and the patient's admission status was agreed to be Admission Status: observation status to the service of Dr Eddie Romero   Follow-up Information    None         Patient's Medications   Discharge Prescriptions    No medications on file     No discharge procedures on file      ED Provider  Electronically Signed by           Elisa Perez MD  10/16/18 8100

## 2018-10-16 NOTE — ASSESSMENT & PLAN NOTE
· Patient presented to the emergency department brought in by BLS for alcohol intoxication  · Patient is alert, however is a poor historian at this point and information is obtained from wife  · Patient was last admitted here in April for pancreatitis and stop drinking afterwards, about 2 months ago he started drinking heavily to [employer]; when wife came home from work today patient was sitting next to a bottle of vodka and essentially unresponsive  · Patient is admitted for observation due to his high alcohol level  · Will admit to telemetry, seizure and aspiration precautions, continuous pulse ox monitoring  · CIWA protocol  · Consider Librium in the morning when patient is no longer clinically intoxicated  · Psych and social work consult probably needs rehabilitation

## 2018-10-16 NOTE — ED NOTES
ER attending requested PES assistance with pt for etoh detox; pt's etoh is 482 @ 16:56  Pt's wife is asking for help for the pt  Pt was recently at [facility] with a reported bal of 220 and they chose not to admit pt to detox  Pt has chronic pain issues and is self medicating with etoh  Family given [facility] card and directions to [employer] - once arrangements are made  PES called and discussed case with [facility]  ER attending looking into having pt admitted here b/c etoh level is so high

## 2018-10-17 VITALS
HEIGHT: 72 IN | BODY MASS INDEX: 23.84 KG/M2 | RESPIRATION RATE: 19 BRPM | HEART RATE: 94 BPM | SYSTOLIC BLOOD PRESSURE: 166 MMHG | OXYGEN SATURATION: 96 % | TEMPERATURE: 98.4 F | DIASTOLIC BLOOD PRESSURE: 99 MMHG | WEIGHT: 176 LBS

## 2018-10-17 PROBLEM — E83.42 HYPOMAGNESEMIA: Status: RESOLVED | Noted: 2018-04-01 | Resolved: 2018-10-17

## 2018-10-17 PROBLEM — F32.9 MAJOR DEPRESSIVE DISORDER WITH SINGLE EPISODE: Status: ACTIVE | Noted: 2018-10-17

## 2018-10-17 PROBLEM — E87.1 HYPONATREMIA: Status: RESOLVED | Noted: 2018-03-31 | Resolved: 2018-10-17

## 2018-10-17 PROBLEM — F10.939 ALCOHOL WITHDRAWAL (HCC): Status: ACTIVE | Noted: 2018-10-16

## 2018-10-17 PROBLEM — F10.239 ALCOHOL WITHDRAWAL (HCC): Status: ACTIVE | Noted: 2018-10-16

## 2018-10-17 LAB
ALBUMIN SERPL BCP-MCNC: 3 G/DL (ref 3.5–5)
ALP SERPL-CCNC: 153 U/L (ref 46–116)
ALT SERPL W P-5'-P-CCNC: 161 U/L (ref 12–78)
ANION GAP SERPL CALCULATED.3IONS-SCNC: 12 MMOL/L (ref 4–13)
AST SERPL W P-5'-P-CCNC: 250 U/L (ref 5–45)
ATRIAL RATE: 87 BPM
BASOPHILS # BLD AUTO: 0.03 THOUSANDS/ΜL (ref 0–0.1)
BASOPHILS NFR BLD AUTO: 1 % (ref 0–1)
BILIRUB SERPL-MCNC: 1 MG/DL (ref 0.2–1)
BUN SERPL-MCNC: 9 MG/DL (ref 5–25)
CALCIUM SERPL-MCNC: 7.6 MG/DL (ref 8.3–10.1)
CHLORIDE SERPL-SCNC: 106 MMOL/L (ref 100–108)
CO2 SERPL-SCNC: 26 MMOL/L (ref 21–32)
CREAT SERPL-MCNC: 0.6 MG/DL (ref 0.6–1.3)
EOSINOPHIL # BLD AUTO: 0.03 THOUSAND/ΜL (ref 0–0.61)
EOSINOPHIL NFR BLD AUTO: 1 % (ref 0–6)
ERYTHROCYTE [DISTWIDTH] IN BLOOD BY AUTOMATED COUNT: 15.3 % (ref 11.6–15.1)
GFR SERPL CREATININE-BSD FRML MDRD: 118 ML/MIN/1.73SQ M
GLUCOSE P FAST SERPL-MCNC: 70 MG/DL (ref 65–99)
GLUCOSE SERPL-MCNC: 70 MG/DL (ref 65–140)
HCT VFR BLD AUTO: 33.1 % (ref 36.5–49.3)
HGB BLD-MCNC: 10.5 G/DL (ref 12–17)
IMM GRANULOCYTES # BLD AUTO: 0 THOUSAND/UL (ref 0–0.2)
IMM GRANULOCYTES NFR BLD AUTO: 0 % (ref 0–2)
LIPASE SERPL-CCNC: 415 U/L (ref 73–393)
LYMPHOCYTES # BLD AUTO: 1.33 THOUSANDS/ΜL (ref 0.6–4.47)
LYMPHOCYTES NFR BLD AUTO: 47 % (ref 14–44)
MCH RBC QN AUTO: 33.1 PG (ref 26.8–34.3)
MCHC RBC AUTO-ENTMCNC: 31.7 G/DL (ref 31.4–37.4)
MCV RBC AUTO: 104 FL (ref 82–98)
MONOCYTES # BLD AUTO: 0.4 THOUSAND/ΜL (ref 0.17–1.22)
MONOCYTES NFR BLD AUTO: 14 % (ref 4–12)
NEUTROPHILS # BLD AUTO: 1.06 THOUSANDS/ΜL (ref 1.85–7.62)
NEUTS SEG NFR BLD AUTO: 37 % (ref 43–75)
NRBC BLD AUTO-RTO: 0 /100 WBCS
P AXIS: 57 DEGREES
PLATELET # BLD AUTO: 243 THOUSANDS/UL (ref 149–390)
PMV BLD AUTO: 9.3 FL (ref 8.9–12.7)
POTASSIUM SERPL-SCNC: 3.6 MMOL/L (ref 3.5–5.3)
PR INTERVAL: 156 MS
PROT SERPL-MCNC: 6.7 G/DL (ref 6.4–8.2)
QRS AXIS: 7 DEGREES
QRSD INTERVAL: 92 MS
QT INTERVAL: 384 MS
QTC INTERVAL: 462 MS
RBC # BLD AUTO: 3.17 MILLION/UL (ref 3.88–5.62)
SODIUM SERPL-SCNC: 144 MMOL/L (ref 136–145)
T WAVE AXIS: 31 DEGREES
VENTRICULAR RATE: 87 BPM
WBC # BLD AUTO: 2.85 THOUSAND/UL (ref 4.31–10.16)

## 2018-10-17 PROCEDURE — 83690 ASSAY OF LIPASE: CPT | Performed by: NURSE PRACTITIONER

## 2018-10-17 PROCEDURE — 85025 COMPLETE CBC W/AUTO DIFF WBC: CPT | Performed by: NURSE PRACTITIONER

## 2018-10-17 PROCEDURE — 94760 N-INVAS EAR/PLS OXIMETRY 1: CPT

## 2018-10-17 PROCEDURE — 99238 HOSP IP/OBS DSCHRG MGMT 30/<: CPT | Performed by: STUDENT IN AN ORGANIZED HEALTH CARE EDUCATION/TRAINING PROGRAM

## 2018-10-17 PROCEDURE — 99244 OFF/OP CNSLTJ NEW/EST MOD 40: CPT | Performed by: PSYCHIATRY & NEUROLOGY

## 2018-10-17 PROCEDURE — 80053 COMPREHEN METABOLIC PANEL: CPT | Performed by: NURSE PRACTITIONER

## 2018-10-17 PROCEDURE — 93010 ELECTROCARDIOGRAM REPORT: CPT | Performed by: INTERNAL MEDICINE

## 2018-10-17 RX ORDER — CHLORDIAZEPOXIDE HYDROCHLORIDE 25 MG/1
25 CAPSULE, GELATIN COATED ORAL EVERY 6 HOURS SCHEDULED
Status: DISCONTINUED | OUTPATIENT
Start: 2018-10-17 | End: 2018-10-17 | Stop reason: HOSPADM

## 2018-10-17 RX ORDER — DULOXETIN HYDROCHLORIDE 20 MG/1
20 CAPSULE, DELAYED RELEASE ORAL 2 TIMES DAILY
Status: DISCONTINUED | OUTPATIENT
Start: 2018-10-17 | End: 2018-10-17 | Stop reason: HOSPADM

## 2018-10-17 RX ORDER — DULOXETIN HYDROCHLORIDE 20 MG/1
20 CAPSULE, DELAYED RELEASE ORAL 2 TIMES DAILY
Qty: 30 CAPSULE | Refills: 0 | Status: SHIPPED | OUTPATIENT
Start: 2018-10-17 | End: 2019-04-22

## 2018-10-17 RX ORDER — ACETAMINOPHEN 325 MG/1
650 TABLET ORAL EVERY 6 HOURS PRN
Status: DISCONTINUED | OUTPATIENT
Start: 2018-10-17 | End: 2018-10-17 | Stop reason: HOSPADM

## 2018-10-17 RX ADMIN — FOLIC ACID 1 MG: 1 TABLET ORAL at 10:03

## 2018-10-17 RX ADMIN — Medication 1 TABLET: at 10:04

## 2018-10-17 RX ADMIN — FERROUS SULFATE TAB 325 MG (65 MG ELEMENTAL FE) 325 MG: 325 (65 FE) TAB at 10:03

## 2018-10-17 RX ADMIN — CYANOCOBALAMIN TAB 500 MCG 1000 MCG: 500 TAB at 10:03

## 2018-10-17 RX ADMIN — OXYCODONE HYDROCHLORIDE AND ACETAMINOPHEN 2000 MG: 500 TABLET ORAL at 10:03

## 2018-10-17 RX ADMIN — SODIUM CHLORIDE 100 ML/HR: 0.9 INJECTION, SOLUTION INTRAVENOUS at 08:12

## 2018-10-17 RX ADMIN — Medication 100 MG: at 10:06

## 2018-10-17 RX ADMIN — OXCARBAZEPINE 150 MG: 150 TABLET ORAL at 10:05

## 2018-10-17 RX ADMIN — VERAPAMIL HYDROCHLORIDE 120 MG: 120 TABLET, FILM COATED, EXTENDED RELEASE ORAL at 10:07

## 2018-10-17 RX ADMIN — MAGNESIUM OXIDE TAB 400 MG (241.3 MG ELEMENTAL MG) 400 MG: 400 (241.3 MG) TAB at 10:03

## 2018-10-17 RX ADMIN — ACETAMINOPHEN 650 MG: 325 TABLET, FILM COATED ORAL at 14:49

## 2018-10-17 NOTE — SOCIAL WORK
PT LIVES INDEPENDENTLY, NO DME OR HHC NEEDS  PT SEEN BY NEHAL FROM [facility], PT WILL F/U POST D/C  DASH discussion completed  Discussed goals of making sure pt's  needs are met upon discharge, pt's preferences are taken into account, pt understands health condition, medications and symptoms to watch for after returning home and pt is aware of any follow up appointments recommended by hospital physician

## 2018-10-17 NOTE — ASSESSMENT & PLAN NOTE
· Was on tegretol but doesn't want to take it anymore because he thinks its causing SE  · Will need to follow up with neurology as outpt, sees Dr Cope Del

## 2018-10-17 NOTE — PHYSICIAN ADVISOR
Current patient class: Inpatient  The patient is currently on Hospital Day: 2 at [address] [place] [address]        The patient was admitted to the hospital  on 10/17/18 at [id number] for the following diagnosis:  Chronic pancreatitis ([facility] ) [K86 1]  Altered mental status [R41 82]  Acute alcohol intoxication ([facility] ) [F10 929]     After review of the relevant documentation, labs, vital signs and test results, the patient is most appropriate for OBSERVATION STATUS  The patient was admitted to the hospital without an expected length of stay of at least 2 midnights  Given that the patient is subject to the 2 midnight benchmark as a CMS patient, they are appropriate for observation status at this time  Should the patient remain hospitalized for a second midnight the status should be reevaluated for medical necessity  Rationale is as follows:     The patient is a 51 yrs   Male who presented to the ED at 10/16/2018  4:32 PM with a chief complaint of Altered Mental Status (As per squad, pt found at home by significant other with an empty vodka by his side and pt was unresponsive but not passed put as per wife  )    The patients vitals on arrival were ED Triage Vitals [10/16/18 1635]   Temperature Pulse Respirations Blood Pressure SpO2   97 6 °F (36 4 °C) 94 18 (!) 161/107 94 %      Temp Source Heart Rate Source Patient Position - Orthostatic VS BP Location FiO2 (%)   Tympanic Monitor Lying Right arm --      Pain Score       No Pain           Past Medical History:   Diagnosis Date    Anemia     Balance problems     [facility]     Hypertension     Insomnia     Occipital neuralgia     Other mechanical complication of implanted electronic neurostimulator of spinal cord electrode (lead), sequela     maybe not working   CarMax of spinal cord ([facility] )     Vitamin D deficiency      Past Surgical History:   Procedure Laterality Date    ABDOMINAL SURGERY      complete gastrectomy    BACK SURGERY cervical discectomy    GASTRECTOMY      INSERTION / PLACEMENT / REVISION NEUROSTIMULATOR      Neuro stimulator for iccipital neurolgia           Consults have been placed to:   IP CONSULT TO PSYCHIATRY    Vitals:    10/17/18 0758 10/17/18 1130 10/17/18 1311 10/17/18 1500   BP: 168/100  166/99    BP Location: Right arm  Right arm    Pulse: 91  94    Resp: 20  19    Temp: 98 5 °F (36 9 °C)  98 4 °F (36 9 °C)    TempSrc: Oral  Oral    SpO2: 96% 96% 95% 96%   Weight:       Height:           Most recent labs:    Recent Labs      10/16/18   1656  10/17/18   0526   WBC  4 29*  2 85*   HGB  11 6*  10 5*   HCT  36 0*  33 1*   PLT  299  243   K  3 5  3 6   NA  145  144   CALCIUM  8 1*  7 6*   BUN  9  9   CREATININE  0 66  0 60   LIPASE  854*  415*   INR  0 98   --    AST  261*  250*   ALT  176*  161*   ALKPHOS  193*  153*       Scheduled Meds:  Current Facility-Administered Medications:  acetaminophen 650 mg Oral Once Glo Jumper, CRNP    acetaminophen 650 mg Oral Q6H PRN Fortino Meier MD    vitamin C 2,000 mg Oral Daily Glo Jumper, CRNP    chlordiazePOXIDE 25 mg Oral Q6H 3630 Eduar Leigh MD    cyanocobalamin 1,000 mcg Oral Daily Glo Jumper, CRNP    DULoxetine 20 mg Oral BID Lisandra Echeverria MD    enoxaparin 40 mg Subcutaneous Q24H 3630 Eduar Leigh MD    ferrous sulfate 325 mg Oral Daily Glo Jumper, CRNP    folic acid 1 mg Oral Daily Glo Jumper, CRNP    magnesium oxide 400 mg Oral Daily Glo Jumper, CRNP    multivitamin-minerals 1 tablet Oral Daily Glo Jumper, CRNP    ondansetron 4 mg Intravenous Q6H PRN Glo Jumper, CRNP    OXcarbazepine 150 mg Oral BID Glo Jumper, CRNP    sodium chloride 125 mL/hr Intravenous Continuous Fortino Meier MD Last Rate: 125 mL/hr (10/17/18 1005)   thiamine 100 mg Oral Daily Glo Jumper, CRNP    verapamil 120 mg Oral BID Glo Jumper, CRNP      Continuous Infusions:  sodium chloride 125 mL/hr Last Rate: 125 mL/hr (10/17/18 1005)     PRN Meds: acetaminophen    ondansetron    Surgical procedures (if appropriate):

## 2018-10-17 NOTE — PLAN OF CARE
Problem: RESPIRATORY - ADULT  Goal: Achieves optimal ventilation and oxygenation  INTERVENTIONS:  - Assess for changes in respiratory status  -Monitor  Outcome: Progressing  poc due 10/20/18 0600

## 2018-10-17 NOTE — H&P
H&P- Jacquie Olson 1970, 51 y o  male MRN: [id number]    Unit/Bed#: [address] [address] Encounter: [id number]    Primary Care Provider: Betina Bowman MD   Date and time admitted to hospital: 10/16/2018  4:32 PM        * Alcoholic intoxication without complication [facility])   Assessment & Plan    · Patient presented to the emergency department brought in by BLS for alcohol intoxication  · Patient is alert, however is a poor historian at this point and information is obtained from wife  · Patient was last admitted here in April for pancreatitis and stop drinking afterwards, about 2 months ago he started drinking heavily to [employer]; when wife came home from work today patient was sitting next to a bottle of vodka and essentially unresponsive  · Patient is admitted for observation due to his high alcohol level  · Will admit to telemetry, seizure and aspiration precautions, continuous pulse ox monitoring  · CIWA protocol  · Consider Librium in the morning when patient is no longer clinically intoxicated  · Psych and social work consult probably needs rehabilitation     Abnormal serum level of lipase   Assessment & Plan    · Lipase appears to be chronically elevated when compared to previous labs  · Patient denies any abdominal pain, nausea, vomiting  · Will trend lipase and consider CT of the abdomen and pelvis in the morning depending upon clinical course     Abnormal transaminases   Assessment & Plan    · Probably secondary to alcohol abuse, appears chronic in nature  · Will trend     Essential hypertension   Assessment & Plan    · Continue home medications     Occipital neuralgia   Assessment & Plan    · Continue home medications       VTE Prophylaxis: Heparin  / sequential compression device   Code Status: Level 1 - Full Code    Anticipated Length of Stay:  Patient will be admitted on an Observation basis with an anticipated length of stay of  Less than 2 midnights     Justification for BALTAZAR S[facility]NCHEZ Stay: alcohol intoxication    Total Time for Visit, including Counseling / Coordination of Care: 20 minutes  Greater than 50% of this total time spent on direct patient counseling and coordination of care  Chief Complaint:   Altered Mental Status (As per squad, pt found at home by significant other with an empty vodka by his side and pt was unresponsive but not passed put as per wife  )      History of Present Illness:    Ruby Mantilla is a 51 y o  male with a PMH of occipital neuralgia, alcohol dependence, hypertension who presents with alcohol intoxication  Information is primarily obtained from wife at bedside  Patient is alert, awake and speaking in full sentences though he is not a reliable historian probably secondary to his alcohol intoxication  Patient has a history of chronic pain secondary to a disc issue in his neck as well as occipital neuralgia  Patient has been self medicating with alcohol for some time now  For the past 2 months patient has been drinking a bottle of vodka daily  This afternoon when his wife came home he was found sitting next to an empty bottle of vodka and was unresponsive  She called EMS who brought him to the emergency department  Due to his high alcohol level he is admitted overnight for monitoring  Patient denies any symptoms including nausea, vomiting, abdominal pain, chest pain, shortness of breath  Review of Systems:    Review of Systems   Constitutional: Negative  HENT: Negative  Eyes: Negative  Respiratory: Negative  Cardiovascular: Negative  Gastrointestinal: Negative  Endocrine: Negative  Genitourinary: Negative  Musculoskeletal: Positive for neck pain  Allergic/Immunologic: Negative  Neurological: Negative  Hematological: Negative  Psychiatric/Behavioral: Positive for behavioral problems and sleep disturbance         Past Medical and Surgical History:     Past Medical History:   Diagnosis Date    Anemia     Balance problems     Cancer of stomach ([address] )     Hypertension     Insomnia     Occipital neuralgia     Other mechanical complication of implanted electronic neurostimulator of spinal cord electrode (lead), sequela     maybe not working   [employer]Max of spinal cord (HCC)     Vitamin D deficiency        Past Surgical History:   Procedure Laterality Date    ABDOMINAL SURGERY      complete gastrectomy    BACK SURGERY      cervical discectomy    GASTRECTOMY      INSERTION / PLACEMENT / REVISION NEUROSTIMULATOR      Neuro stimulator for iccipital neurolgia       Meds/Allergies:    Prior to Admission medications    Medication Sig Start Date End Date Taking? Authorizing Provider   Ascorbic Acid (VITAMIN C) 1000 MG tablet Take 2,000 mg by mouth daily   Yes Historical Provider, MD   butalbital-aspirin-caffeine [facility]) -40 mg per capsule Take 1 capsule by mouth 3 (three) times a day as needed for headaches   Yes Historical Provider, MD   Cyanocobalamin (VITAMIN B 12 PO) Take by mouth   Yes Historical Provider, MD   FERROUS SULFATE PO Take 220 mg by mouth daily   Yes Historical Provider, MD   magnesium oxide (MAG-OX) 400 mg Take 1 tablet (400 mg total) by mouth daily 4/7/18  Yes Dinh Lamar MD   OXcarbazepine (TRILEPTAL) 150 mg tablet Take 1 tablet (150 mg total) by mouth 2 (two) times a day 8/30/18  Yes Vivi Trevino MD   verapamil (CALAN-SR) 120 mg CR tablet Take 1 tablet (120 mg total) by mouth 2 (two) times a day 8/30/18  Yes Vivi Trevino MD   carbamazepine (CARBATROL) 100 MG 12 hr capsule Take 1 capsule in morning and 2 capsules in evening    Patient not taking: Reported on 8/30/2018 5/31/18 8/30/18  Vivi Trevino MD   midodrine (PROAMATINE) 2 5 mg tablet Take 1 tablet (2 5 mg total) by mouth 3 (three) times a day 5/31/18 10/16/18  Vivi Trevino MD   traMADol Tally Raspberry) 50 mg tablet Take 50 mg by mouth every 6 (six) hours as needed for moderate pain  10/16/18  Historical Provider, MD       Allergies: Allergies   Allergen Reactions    Carbamazepine Rash, Eye Swelling and Facial Swelling       Social History:     Marital Status: /Civil Union   Substance Use History:   History   Alcohol Use    Yes     Comment: 5-6 pints of vodka/wk     History   Smoking Status    Former Smoker   Smokeless Tobacco    Never Used     Comment: [employer] 30 yrs ago     History   Drug Use No       Family History:    non-contributory    Physical Exam:     Vitals:   Blood Pressure: 145/91 (10/16/18 2006)  Pulse: 71 (10/16/18 2006)  Temperature: 97 9 °F (36 6 °C) (10/16/18 2006)  Temp Source: Oral (10/16/18 2006)  Respirations: 18 (10/16/18 2006)  Height: 6' (182 9 cm) (10/16/18 2006)  Weight - Scale: 79 8 kg (176 lb) (10/16/18 2006)  SpO2: 95 % (10/16/18 2006)    Physical Exam   Constitutional: He is oriented to person, place, and time  He appears well-developed and well-nourished  HENT:   Head: Normocephalic  Eyes: EOM are normal    Pupils 4mm bilaterally   Cardiovascular: Normal rate, regular rhythm, normal heart sounds and normal pulses  Pulmonary/Chest: Effort normal and breath sounds normal    Abdominal: Soft  Normal appearance  There is no tenderness  Musculoskeletal: Normal range of motion  Neurological: He is alert and oriented to person, place, and time  GCS eye subscore is 4  GCS verbal subscore is 5  GCS motor subscore is 6  Additional Data:     Lab Results: I have personally reviewed pertinent reports          Results from last 7 days  Lab Units 10/16/18  1656   WBC Thousand/uL 4 29*   HEMOGLOBIN g/dL 11 6*   HEMATOCRIT % 36 0*   PLATELETS Thousands/uL 299   NEUTROS PCT % 30*   LYMPHS PCT % 56*   MONOS PCT % 12   EOS PCT % 1       Results from last 7 days  Lab Units 10/16/18  1656   SODIUM mmol/L 145   POTASSIUM mmol/L 3 5   CHLORIDE mmol/L 106   CO2 mmol/L 26   BUN mg/dL 9   CREATININE mg/dL 0 66   CALCIUM mg/dL 8 1*   ALK PHOS U/L 193*   ALT U/L 176*   AST U/L 261*       Results from last 7 days  Lab Units 10/16/18  1656   INR  0 98       Imaging: I have personally reviewed pertinent reports  CT head without contrast   Final Result by Kimberley Rosenthal DO (10/16 0954)   No acute intracranial abnormality  Workstation performed: [id number]             CT head without contrast   Final Result   No acute intracranial abnormality  Workstation performed: [id number]             EKG, Pathology, and Other Studies Reviewed on Admission:   · EKG: NSR    Allscripts / Epic Records Reviewed: Yes     ** Please Note: This note has been constructed using a voice recognition system   **

## 2018-10-17 NOTE — PROGRESS NOTES
Progress Note - Nolan Blue 1970, 51 y o  male MRN: [id number]    Unit/Bed#: [address] [place] [address] Encounter: [id number]    Primary Care Provider: Flower Vigil MD   Date and time admitted to hospital: 10/16/2018  4:32 PM        * Alcohol withdrawal [facility])   Assessment & Plan    · Brought to ED by wife for alcohol intoxication, EtOh was 482  Admitted for detox  · Telemetry monitoring  · seizure and aspiration precautions  · CIWA protocol  · IVF hydration, MVI, thiamine and folate  · Start low dose librium   · Psych and social work consulted, recs appreciated  Patient does not want inpatient rehab at this time  Abnormal serum level of lipase   Assessment & Plan    · Lipase appears to be chronically elevated when compared to previous labs  · Patient denies any abdominal pain, nausea, vomiting  · Cont to trend     Abnormal transaminases   Assessment & Plan    · Probably secondary to alcohol abuse, appears chronic in nature  · Cont to trend     Essential hypertension   Assessment & Plan    · Continue home medications     Occipital neuralgia   Assessment & Plan    · Was on tegretol but doesn't want to take it anymore because he thinks its causing SE  · Will need to follow up with neurology as outpt, sees Dr June Miller           VTE Pharmacologic Prophylaxis:   Pharmacologic: Enoxaparin (Lovenox)  Mechanical VTE Prophylaxis in Place: Yes    Patient Centered Rounds: I have performed bedside rounds with nursing staff today  Discussions with Specialists or Other Care Team Provider: Yes    Education and Discussions with Family / Patient:Yes    Time Spent for Care: 30 minutes  More than 50% of total time spent on counseling and coordination of care as described above  Current Length of Stay: 0 day(s)    Current Patient Status: Inpatient     Discharge Plan: pending    Code Status: Level 1 - Full Code      Subjective:   Feels shaky and sweating a lot  Denies hallucinations     Wife at bedside states that he does start to hallucinate in the past when withdrawing  Objective:       Vitals:   Temp (24hrs), Av °F (36 7 °C), Min:97 6 °F (36 4 °C), Max:98 5 °F (36 9 °C)    Temp:  [97 6 °F (36 4 °C)-98 5 °F (36 9 °C)] 98 4 °F (36 9 °C)  HR:  [68-94] 94  Resp:  [12-23] 19  BP: (109-168)/() 166/99  SpO2:  [90 %-99 %] 95 %  Body mass index is 23 87 kg/m²  Input and Output Summary (last 24 hours): Intake/Output Summary (Last 24 hours) at 10/17/18 1503  Last data filed at 10/17/18 1005   Gross per 24 hour   Intake          2478 33 ml   Output                0 ml   Net          2478 33 ml       Physical Exam:     Physical Exam   Constitutional: He is oriented to person, place, and time  He appears well-developed  No distress  HENT:   Head: Normocephalic and atraumatic  Cardiovascular: Regular rhythm and normal heart sounds  No murmur heard  Sinus tachycardia   Pulmonary/Chest: Effort normal and breath sounds normal  No respiratory distress  He has no wheezes  He has no rales  Abdominal: Soft  Bowel sounds are normal  He exhibits no distension  There is no tenderness  There is no rebound and no guarding  Musculoskeletal: He exhibits no edema, tenderness or deformity  Neurological: He is alert and oriented to person, place, and time  + tremors   Skin: Skin is warm and dry  Psychiatric: His behavior is normal    Flat affect   Nursing note and vitals reviewed        Additional Data:     Labs:      Results from last 7 days  Lab Units 10/17/18  0526   WBC Thousand/uL 2 85*   HEMOGLOBIN g/dL 10 5*   HEMATOCRIT % 33 1*   PLATELETS Thousands/uL 243   NEUTROS PCT % 37*   LYMPHS PCT % 47*   MONOS PCT % 14*   EOS PCT % 1       Results from last 7 days  Lab Units 10/17/18  0526   SODIUM mmol/L 144   POTASSIUM mmol/L 3 6   CHLORIDE mmol/L 106   CO2 mmol/L 26   BUN mg/dL 9   CREATININE mg/dL 0 60   CALCIUM mg/dL 7 6*   ALK PHOS U/L 153*   ALT U/L 161*   AST U/L 250*       Results from last 7 days  Lab Units 10/16/18  1656   INR  0 98       * I Have Reviewed All Lab Data Listed Above  * Additional Pertinent Lab Tests Reviewed: All Labs Within Last 24 Hours Reviewed    Imaging:  Ct Head Without Contrast    Result Date: 10/16/2018  Narrative: CT BRAIN - WITHOUT CONTRAST INDICATION:   Altered mental status  COMPARISON:  July 12, 2015 TECHNIQUE:  CT examination of the brain was performed  In addition to axial images, coronal 2D reformatted images were created and submitted for interpretation  Radiation dose length product (DLP) for this visit:  1244 16 mGy-cm   This examination, like all CT scans performed in the [facility], was performed utilizing techniques to minimize radiation dose exposure, including the use of iterative reconstruction and automated exposure control  IMAGE QUALITY:  Diagnostic  FINDINGS: PARENCHYMA:  No intracranial mass, mass effect or midline shift  No CT signs of acute infarction  No acute parenchymal hemorrhage  VENTRICLES AND EXTRA-AXIAL SPACES:  Normal for the patient's age  VISUALIZED ORBITS AND PARANASAL SINUSES:  Unremarkable  CALVARIUM AND EXTRACRANIAL SOFT TISSUES:  Normal      Impression: No acute intracranial abnormality   Workstation performed: [id number]     Imaging Reports Reviewed by myself    Cultures:   Blood Culture: No results found for: BLOODCX  Urine Culture: No results found for: URINECX  Sputum Culture: No components found for: SPUTUMCX  Wound Culture: No results found for: WOUNDCULT    Last 24 Hours Medication List:     Current Facility-Administered Medications:  acetaminophen 650 mg Oral Once Ramana Cap, CRNP    acetaminophen 650 mg Oral Q6H PRN Cory Mayes MD    vitamin C 2,000 mg Oral Daily Ramana Cap, CRNP    chlordiazePOXIDE 25 mg Oral Q6H 3630 Eduar Leigh MD    cyanocobalamin 1,000 mcg Oral Daily Ramana Cap, CRNP    DULoxetine 20 mg Oral BID Ca Verdugo MD    enoxaparin 40 mg Subcutaneous Q24H 3630 Eduar Leigh MD    ferrous sulfate 325 mg Oral Daily ATUL Joyner    folic acid 1 mg Oral Daily Virginia Teixeira, ATUL    magnesium oxide 400 mg Oral Daily Virginia Teixeira, ATUL    multivitamin-minerals 1 tablet Oral Daily ATUL Joyner    ondansetron 4 mg Intravenous Q6H PRN ATUL Joyner    OXcarbazepine 150 mg Oral BID ATUL Joyner    sodium chloride 125 mL/hr Intravenous Continuous Jovan Powell MD Last Rate: 125 mL/hr (10/17/18 1005)   thiamine 100 mg Oral Daily ATUL Joyner    verapamil 120 mg Oral BID ATUL Joyner         Today, Patient Was Seen By: Jovan Powell MD    ** Please Note: Dragon 360 Dictation voice to text software may have been used in the creation of this document   **

## 2018-10-17 NOTE — UTILIZATION REVIEW
Initial Clinical Review    PATIENT WAS OBSERVATION STATUS 10/16/18 CONVERTED TO INPATIENT ADMISSION 10/17/18 PER MD WORSENING WITHDRAWAL SYMPTOMS FROM ALCOHOL,     Admission: Date/Time/Statement: 10/17/18   Inpatient Admission (Order [id number])   Admission   Date: 10/17/2018 Department: [address] [place] [address] [address] Released By/Authorizing: Jovan Powell MD (auto-released)   Order Information     Order Name   INPATIENT ADMISSION [263]     Inpatient   Date/Time Action Taken User Additional Information   10/17/18 1439 Release Jovan Powell MD (auto-released) From Order: [id number]   10/17/18 1439 Complete Jovan Powell MD    Order Details     Frequency Duration Priority Order Class   Once 1  occurrence Routine Hospital Performed   Inpatient Admission   Order: [id number]   Status:  Completed   Visible to patient:  No (Not Released) Next appt:  11/29/2018 at 03:00 PM in Neurology (Jd Love MD)                             Order Questions     Question Answer Comment   Admitting Physician NEAL MELO    Level of Care Med Surg    Estimated length of stay More than 2 Midnights    Certification I certify that inpatient services are medically necessary for this patient for a duration of greater than two midnights   See H&P and MD Progress Notes for additional information about the patient's course of treatment        Orders Placed This Encounter   Procedures    Place in Observation (expected length of stay for this patient is less than two midnights)     Standing Status:   Standing     Number of Occurrences:   1     Order Specific Question:   Admitting Physician     Answer:   Carrie Johnson     Order Specific Question:   Level of Care     Answer:   Med Surg [16]         ED: Date/Time/Mode of Arrival:   ED Arrival Information     Expected Arrival Acuity Means of Arrival Escorted By Service Admission Type    - 10/16/2018 16:29 Urgent Ambulance [address] Urgent    Arrival Complaint    altered mental status          Chief Complaint:   Chief Complaint   Patient presents with    Altered Mental Status     As per naun, pt found at home by significant other with an empty vodka by his side and pt was unresponsive but not passed put as per wife  History of Illness: Michele Moreira is a 51 y o  male with a PMH of occipital neuralgia, alcohol dependence, hypertension who presents with alcohol intoxication  Information is primarily obtained from wife at bedside  Patient is alert, awake and speaking in full sentences though he is not a reliable historian probably secondary to his alcohol intoxication  Patient has a history of chronic pain secondary to a disc issue in his neck as well as occipital neuralgia  Patient has been self medicating with alcohol for some time now  For the past 2 months patient has been drinking a bottle of vodka daily  This afternoon when his wife came home he was found sitting next to an empty bottle of vodka and was unresponsive  She called EMS who brought him to the emergency department  Due to his high alcohol level he is admitted overnight for monitoring    Patient denies any symptoms including nausea, vomiting, abdominal pain, chest pain, shortness of breath        ED Vital Signs:   ED Triage Vitals [10/16/18 1635]   Temperature Pulse Respirations Blood Pressure SpO2   97 6 °F (36 4 °C) 94 18 (!) 161/107 94 %      Temp Source Heart Rate Source Patient Position - Orthostatic VS BP Location FiO2 (%)   Tympanic Monitor Lying Right arm --      Pain Score       No Pain        Wt Readings from Last 1 Encounters:   10/16/18 79 8 kg (176 lb)       Abnormal Labs/Diagnostic Test Results: CA 7 6 >250 >161 ALK PHOS 193>153 ALB 3 0 LIPASE 854>415 WBC 4 29>2 85 H/H 10 5/33 1 MEDICAL ALCOHOL 482   CT HEAD  No acute intracranial abnormality      ED Treatment:   Medication Administration from 10/16/2018 1629 to 10/16/2018 2002       Date/Time Order Dose Route Action Action by Comments     10/16/2018 1941 sodium chloride 0 9 % bolus 1,000 mL 0 mL Intravenous Stopped Radha Jansen RN      10/16/2018 1658 sodium chloride 0 9 % bolus 1,000 mL 1,000 mL Intravenous New Bag Yomaira Fernández RN           Past Medical/Surgical History:    Active Ambulatory Problems     Diagnosis Date Noted    Acute pancreatitis without infection or necrosis 03/31/2018    Occipital neuralgia 03/31/2018    Essential hypertension 03/31/2018    Hyponatremia 03/31/2018    History of gastric cancer     Hypomagnesemia 04/01/2018    Pancytopenia ([address] ) 04/03/2018     Resolved Ambulatory Problems     Diagnosis Date Noted    High anion gap metabolic acidosis 25/53/5826    Elevated LFTs 03/31/2018    Alcohol abuse 03/31/2018     Past Medical History:   Diagnosis Date    Anemia     Balance problems     Cancer of stomach ([address] )     Hypertension     Insomnia     Occipital neuralgia     Other mechanical complication of implanted electronic neurostimulator of spinal cord electrode (lead), sequela     Syrinx of spinal cord (HCC)     Vitamin D deficiency        Admitting Diagnosis: Chronic pancreatitis (HCC) [K86 1]  Altered mental status [R41 82]  Acute alcohol intoxication ([address] ) [F10 929]    Age/Sex: 51 y o  male    Assessment/Plan:   Alcoholic intoxication without complication ([address] )   Assessment & Plan     · Patient presented to the emergency department brought in by BLS for alcohol intoxication  · Patient is alert, however is a poor historian at this point and information is obtained from wife  · Patient was last admitted here in April for pancreatitis and stop drinking afterwards, about 2 months ago he started drinking heavily to [employer]; when wife came home from work today patient was sitting next to a bottle of vodka and essentially unresponsive  · Patient is admitted for observation due to his high alcohol level  · Will admit to telemetry, seizure and aspiration precautions, continuous pulse ox monitoring  · CIWA protocol  · Consider Librium in the morning when patient is no longer clinically intoxicated  · Psych and social work consult probably needs rehabilitation   Abnormal serum level of lipase   Assessment & Plan     · Lipase appears to be chronically elevated when compared to previous labs  · Patient denies any abdominal pain, nausea, vomiting  · Will trend lipase and consider CT of the abdomen and pelvis in the morning depending upon clinical course   Abnormal transaminases   Assessment & Plan     · Probably secondary to alcohol abuse, appears chronic in nature  · Will trend   Essential hypertension   Assessment & Plan     · Continue home medications   Occipital neuralgia   Assessment & Plan     · Continue home medications    VTE Prophylaxis: Heparin  / sequential compression device   Code Status: Level 1 - Full Code  Anticipated Length of Stay:  Patient will be admitted on an Observation basis with an anticipated length of stay of  Less than 2 midnights     Justification for Hospital Stay: alcohol intoxication       Admission Orders:    TELE MON  ASPIRATION PRECAUTIONS  SEIZURE PRECAUTIONS  AMBULATE PT    CONSULT PSYCHIATRY  ELEVATE HOB 30 DEGREE  Scheduled Meds:   Current Facility-Administered Medications:  acetaminophen 650 mg Oral Once Lawayne Antis, CRNP    vitamin C 2,000 mg Oral Daily Lawayne Antis, CRNP    cyanocobalamin 1,000 mcg Oral Daily Lawayne Antis, CRNP    ferrous sulfate 325 mg Oral Daily Lawayne Antis, CRNP    folic acid 1 mg Oral Daily Lawayne Antis, CRNP    magnesium oxide 400 mg Oral Daily Lawayne Antis, CRNP    multivitamin-minerals 1 tablet Oral Daily Lawayne Antis, CRNP    ondansetron 4 mg Intravenous Q6H PRN Lawayne Antis, CRNP    OXcarbazepine 150 mg Oral BID Lawayne Antis, CRNP    sodium chloride 100 mL/hr Intravenous Continuous Lawayne Antis, CRNP Last Rate: 100 mL/hr (10/17/18 2137)   thiamine 100 mg Oral Daily Lawayne Antis, CRNP verapamil 120 mg Oral BID ATUL Rodrigues      Continuous Infusions:   sodium chloride 100 mL/hr Last Rate: 100 mL/hr (10/17/18 0812)     PRN Meds: ondansetron

## 2018-10-17 NOTE — ASSESSMENT & PLAN NOTE
· Lipase appears to be chronically elevated when compared to previous labs  · Patient denies any abdominal pain, nausea, vomiting  · Will trend lipase and consider CT of the abdomen and pelvis in the morning depending upon clinical course

## 2018-10-17 NOTE — PLAN OF CARE
CARDIOVASCULAR - ADULT     Maintains optimal cardiac output and hemodynamic stability Progressing        DISCHARGE PLANNING     Discharge to home or other facility with appropriate resources Progressing        INFECTION - ADULT     Absence or prevention of progression during hospitalization Progressing        Knowledge Deficit     Patient/family/caregiver demonstrates understanding of disease process, treatment plan, medications, and discharge instructions Progressing        PAIN - ADULT     Verbalizes/displays adequate comfort level or baseline comfort level Progressing        Potential for Falls     Patient will remain free of falls Progressing        RESPIRATORY - ADULT     Achieves optimal ventilation and oxygenation Progressing        SAFETY ADULT     Maintain or return to baseline ADL function Progressing     Maintain or return mobility status to optimal level Progressing

## 2018-10-17 NOTE — ASSESSMENT & PLAN NOTE
· Brought to ED by wife for alcohol intoxication, EtOh was 482  Admitted for acute intoxication as his alcohol level was too high to be monitored in the ED  · Initially plan was for patient to complete detox as well however after patient clear to alcohol from his system he wanted to be discharged home  · Telemetry monitoring unremarkable  · seizure and aspiration precautions  · CIWA protocol  · IVF hydration, MVI, thiamine and folate  · Plan was to start Librium however patient did not want detox  · Psych and social work consulted, recs appreciated  Patient does not want inpatient rehab at this time

## 2018-10-17 NOTE — ASSESSMENT & PLAN NOTE
· Brought to ED by wife for alcohol intoxication, EtOh was 482  Admitted for detox  · Telemetry monitoring  · seizure and aspiration precautions  · CIWA protocol  · IVF hydration, MVI, thiamine and folate  · Start low dose librium   · Psych and social work consulted, recs appreciated  Patient does not want inpatient rehab at this time

## 2018-10-17 NOTE — DISCHARGE SUMMARY
Discharge- Donald Chung 1970, 51 y o  male MRN: [id number]    Unit/Bed#: [address] [place] [address] Encounter: [id number]    Primary Care Provider: Aric Weldon MD   Date and time admitted to hospital: 10/16/2018  4:32 PM        * Alcohol withdrawal [facility])   Assessment & Plan    · Brought to ED by wife for alcohol intoxication, EtOh was 482  Admitted for acute intoxication as his alcohol level was too high to be monitored in the ED  · Initially plan was for patient to complete detox as well however after patient clear to alcohol from his system he wanted to be discharged home  · Telemetry monitoring unremarkable  · seizure and aspiration precautions  · CIWA protocol  · IVF hydration, MVI, thiamine and folate  · Plan was to start Librium however patient did not want detox  · Psych and social work consulted, recs appreciated  Patient does not want inpatient rehab at this time        Major depressive disorder with single episode   Assessment & Plan    · Seen by psych   · Started on cymbalta     Essential hypertension   Assessment & Plan    · Continue home medications     Occipital neuralgia   Assessment & Plan    · Was on tegretol but doesn't want to take it anymore because he thinks its causing SE  · Will need to follow up with neurology as outpt, sees Dr Gabriela Greenberg Physician / Practitioner: Kelton Osman MD  PCP: Aric Weldon MD  Admission Date: 10/16/2018  Discharge Date: 10/17/18    Reason for Admission: Altered Mental Status (As per squad, pt found at home by significant other with an empty vodka by his side and pt was unresponsive but not passed put as per wife  )        Resolved Problems  Date Reviewed: 10/16/2018    None          Consultations During Hospital Stay:  IP CONSULT TO PSYCHIATRY    Procedures Performed:     ·     Significant Findings / Test Results:     · ETOH for 82 on admission  Ct Head Without Contrast  Result Date: 10/16/2018  Impression: No acute intracranial abnormality  Workstation performed: [id number]         Incidental Findings:   ·      Test Results Pending at Discharge (will require follow up):   ·      Outpatient Tests Requested:  ·     Complications:  none    Reason for Admission:  Alcohol intoxication    Hospital Course:     Sergio Thomson is a 51 y o  male patient with a PMH of occipital neuralgia, alcohol dependence, hypertension  who originally presented to the hospital on 10/16/2018 due to alcohol intoxication  Information is primarily obtained from wife at bedside  Patient is alert, awake and speaking in full sentences though he is not a reliable historian probably secondary to his alcohol intoxication  Patient has a history of chronic pain secondary to a disc issue in his neck as well as occipital neuralgia  Patient has been self medicating with alcohol for some time now  For the past 2 months patient has been drinking a bottle of vodka daily  This afternoon when his wife came home he was found sitting next to an empty bottle of vodka and was unresponsive  She called EMS who brought him to the emergency department  Due to his high alcohol level he is admitted overnight for monitoring  Patient denies any symptoms including nausea, vomiting, abdominal pain, chest pain, shortness of breath  Please see above list of diagnoses and related plan for additional information  Condition at Discharge: good     Discharge Day Visit / Exam:     Subjective:  Feels better  Wants to go home  Doesn't want to do detox or inpatient rehab     Vitals: Blood Pressure: 166/99 (10/17/18 1311)  Pulse: 94 (10/17/18 1311)  Temperature: 98 4 °F (36 9 °C) (10/17/18 1311)  Temp Source: Oral (10/17/18 1311)  Respirations: 19 (10/17/18 1311)  Height: 6' (182 9 cm) (10/16/18 2006)  Weight - Scale: 79 8 kg (176 lb) (10/16/18 2006)  SpO2: 96 % (10/17/18 1500)  Exam:   Physical Exam    Discharge instructions/Information to patient and family:   See after visit summary for information provided to patient and family  Provisions for Follow-Up Care:  See after visit summary for information related to follow-up care and any pertinent home health orders  Disposition:     Home    Planned Readmission:  No     Discharge Statement:  I spent greater than 30 minutes discharging the patient  This time was spent on the day of discharge  I had direct contact with the patient on the day of discharge  Greater than 50% of the total time was spent examining patient, answering all patient questions, arranging and discussing plan of care with patient as well as directly providing post-discharge instructions  Additional time then spent on discharge activities  Discharge Medications:  See after visit summary for reconciled discharge medications provided to patient and family        ** Please Note: This note has been constructed using a voice recognition system **

## 2018-10-17 NOTE — CONSULTS
Consultation - [address] [facility] 51 y o  male MRN: [id number]  Unit/Bed#: [address] [place] [address] Encounter: [id number]      Chief Complaint: alcohol use/depression    History of Present Illness   Physician Requesting Consult: Fortino Meier MD  Reason for Consult / Principal Problem: alcohol intoxication    Patient is a 51 y o  male presents to the hospital with alcohol intoxication with severe stupor and psych consulted for Signs/symptoms of alcohol or other substance intoxication  Patient was admitted to medical unit for observation, resolution of intoxication  Psychiatry consulted to evaluate above psychiatric complaint  Patient reports he has began drinking high quantities of alcohol and past few weeks usually 4-5 days in a row and then will stop for 4-5 days  He is drinking about 300 mL of vodka at night when he drinks  He reports he has been doing this because of difficulty sleeping which has gotten intolerable for him  He and his wife both discussed at length other major stressors over the past 9 years revolving around an injury of the cervical spine of the patient at work leading to years of severe occipital neuralgia, cervical syrinx and issues dealing with workmen's comp  They state getting proper care for the patient and his pain issues has been difficult due to restrictions placed on them by workman's comp  This has been a major stressor for them for very long period of time  Reports being tried on 93 meds for pain and sleep and spinal stimulator and discectomy but never been tried on first line anti-depressants  Over the past few years the patient's mood has been getting worse  He cites lack of motivation, lack of energy, poor sleep, and depressed mood  During the week has no interest in his usually hobbies and has difficulty getting out of bed to do anything  Makes more of an effort on weekend for his wife's sake but feels he has to force himself to do so   He states that when he does not sleep for 4-5 days he will drink to get sleep  After 4-5 days of no sleeping he often will have visual hallucinations  Other than this he does not hear or see things other people cannot hear or see and denies any past history of manic episodes  He denies any withdrawal during the periods which he does not drink alcohol but does have tremor at baseline due to his spinal injuries  Primary complaints include: chronic pain, concern about health problems, depression worse, difficulty sleeping, relationship difficulties and tearfulness  Onset of symptoms was gradual starting 9 years ago with gradually worsening course since that time  Denies any SI/HI at the times     Psychosocial Stressors financial, health and occupational     Guns at home?: no    Inpatient consult to Psychiatry  Consult performed by: Amalia Church ordered by: Vicky Watkins          Psychiatric Review Of Systems:  Sleep changes: yes, worsening sleep over past few months, using alcohol to get to sleep, help deal with pain  appetite changes: yes, sometimes appetite is up and down  weight changes: no  Anergy?: yes  Anhedonia?: yes  somatic symptoms: yes, chronic pain  anxiety/panic: no  jigar: no  guilty/hopeless: no  self injurious behavior/risky behavior: no  Hallucinations: yes, only after ever lack of sleep, sees things that are not there  Delusions: no    Historical Information   Past Psychiatric History: Therapy, Out Patient - See Dr Harmony Vigil every 2-3 weeks for talk therapy and Dr Marlyn Reyes for medications in the past  Past Suicide attempts: none  Past Violent behavior: none  Past Psychiatric medication trial: Miguel Ángel Greco, unknown anti-depressant/sleep aid    Substance Abuse History: Denies all past or current drug use   Use of Alcohol: moderate how many drinks per day -300mL vodka and how often for 4-5 days in a row than stops for a week    History of IP/OP rehabilitation program: none  Smoking history: former   Use of Caffeine: not assessed    Family Psychiatric History: Denies any psych, drug or suicide history    Social History  Education: post college graduate work or degree in [employer]  Learning Disabilities: none  Marital history:   Living arrangement, social support: The patient lives in home with wife  Occupational History: on permanent disability  Functioning Relationships: good support system  Other Pertinent History:  Service: branch:  in [employer]      Traumatic History:   Abuse: physical: during [place] years  Other Traumatic Events: none    Past Medical History:   Diagnosis Date    Anemia     Balance problems     Cancer of stomach ([address] )     Hypertension     Insomnia     Occipital neuralgia     Other mechanical complication of implanted electronic neurostimulator of spinal cord electrode (lead), sequela     maybe not working   [employer] of spinal cord (HCC)     Vitamin D deficiency        Medical Review Of Systems:  Review of Systems    Meds/Allergies   all current active meds have been reviewed  Allergies   Allergen Reactions    Carbamazepine Rash, Eye Swelling and Facial Swelling       Objective   Vital signs in last 24 hours:  Temp:  [97 6 °F (36 4 °C)-98 5 °F (36 9 °C)] 98 4 °F (36 9 °C)  HR:  [68-94] 94  Resp:  [12-23] 19  BP: (109-168)/() 166/99      Intake/Output Summary (Last 24 hours) at 10/17/18 1325  Last data filed at 10/17/18 1005   Gross per 24 hour   Intake          2478 33 ml   Output                0 ml   Net          2478 33 ml       Mental Status Evaluation:  Appearance:  age appropriate   Behavior:  normal   Speech:  normal pitch and normal volume   Mood:  dysthymic   Affect:  normal   Language: naming objects and repeating phrases   Thought Process:  normal   Thought Content:  normal   Perceptual Disturbances: None   Risk Potential: Denies SI/HI   Sensorium:  person, place, time/date and situation   Cognition:  grossly intact   Consciousness:  alert and awake    Attention: attention span and concentration were age appropriate   Intellect: within normal limits   Fund of Knowledge: awareness of current events: yes   Insight:  good   Judgment: good   Muscle Strength and Tone: normal muscle tone in arms   Gait/Station: not assessed   Motor Activity: abnormal movement noted  tremor in hands, patient and wife report he has tremor at baseline     Lab Results:   UDS: not done  Component      Latest Ref Rng & Units 10/17/2018   WBC      4 31 - 10 16 Thousand/uL 2 85 (L)   RBC      3 88 - 5 62 Million/uL 3 17 (L)   SL AMB HEMOGLOBIN      12 0 - 17 0 g/dL 10 5 (L)   HCT      36 5 - 49 3 % 33 1 (L)   SL AMB MCV      82 - 98 fL 104 (H)   SL AMB MCH      26 8 - 34 3 pg 33 1   SL AMB MCHC      31 4 - 37 4 g/dL 31 7   RDW      11 6 - 15 1 % 15 3 (H)   MPV      8 9 - 12 7 fL 9 3   Platelet Count      555 - 390 Thousands/uL 243   nRBC      /100 WBCs 0   Neutrophils Relative      43 - 75 % 37 (L)   Immat GRANS %      0 - 2 % 0   Lymphocytes Relative      14 - 44 % 47 (H)   Monocytes Relative      4 - 12 % 14 (H)   Eosinophils Relative      0 - 6 % 1   Basophils Relative      0 - 1 % 1   Neutrophils Absolute      1 85 - 7 62 Thousands/µL 1 06 (L)   Immature Grans Absolute      0 00 - 0 20 Thousand/uL 0 00   Lymphocytes Absolute      0 60 - 4 47 Thousands/µL 1 33   Monocytes Absolute      0 17 - 1 22 Thousand/µL 0 40   Eosinophils Absolute      0 00 - 0 61 Thousand/µL 0 03   Basophils Absolute      0 00 - 0 10 Thousands/µL 0 03   Sodium      136 - 145 mmol/L 144   SL AMB POTASSIUM      3 5 - 5 3 mmol/L 3 6   Chloride      100 - 108 mmol/L 106   CO2      21 - 32 mmol/L 26   Anion Gap      4 - 13 mmol/L 12   BUN      5 - 25 mg/dL 9   Creatinine      0 60 - 1 30 mg/dL 0 60   Glucose      65 - 140 mg/dL 70   GLUCOSE FASTING      65 - 99 mg/dL 70   Calcium      8 3 - 10 1 mg/dL 7 6 (L)   AST (SGOT)      5 - 45 U/L 250 (H)   ALT      12 - 78 U/L 161 (H)   ALK PHOS      46 - 116 U/L 153 (H)   Total Protein 6 4 - 8 2 g/dL 6 7   Albumin      3 5 - 5 0 g/dL 3 0 (L)   TOTAL BILIRUBIN      0 20 - 1 00 mg/dL 1 00   eGFR      ml/min/1 73sq m 118     Component      Latest Ref Rng & Units 10/16/2018   Protime      9 4 - 11 7 seconds 10 3   INR      0 86 - 1 16 0 98   PTT      24 - 36 seconds 25   MEDICAL ALCOHOL      0 - 3 mg/dL 482 (H)     Component      Latest Ref Rng & Units 10/17/2018   Lipase      73 - 393 u/L 415 (H)     Imaging Studies: I have reviewed pertinent imaging - CT head 10/16/2018  EKG, Pathology, and Other Studies: QTc = 462 (10/16/2018)    Code Status: Level 1 - Full Code  Advance Directive and Living Will:      Power of :    POLST:    Assessment/Plan     Provisional Psychiatric Diagnosis:  Primary Diagnosis: Major Depression, single episode  Secondary Diagnosis: Deferred  Medical Diagnosis(es): h/o gastric cancer s/p stomach resection, occipital neuralgia, HTN, pancreatitis      Assessment:  Patient is a 51 y o  male presents with Signs/symptoms of alcohol or other substance intoxication  Patient reports he has been using alcohol to help get to sleep as he has had a lot of difficulty with in the past few months  He does not deny that this is a problem and wants help for sleep  Sleep issues may be secondary to pain and depression  Patient's behaviors include tremor, calm and cooperative, not guarded during interview  Relevant medical issues include chronic pain from occipital neuralgia  Plan:   1  Start Cymbalta 20mg PO BID to continue with outpatient doctor and to be titrated to optimum dose    Risks, benefits and possible side effects of Medications:   Risks, benefits, and possible side effects of medications explained to patient and patient verbalizes understanding  Counseling / Coordination of Care  Total floor / unit time spent today 60 minutes  Greater than 50% of total time was spent with the patient and / or family counseling and / or coordination of care   A description of the counseling / coordination of care: discussion with patient and wife, education about depression and medication, counseling regarding multiple personal stressors, coordination of plan with primary attending    Rodolfo Miller MD

## 2018-10-17 NOTE — NURSING NOTE
Patient discharged to home left ambulatory gait steady with his belongings and his wife  Iv access removed prior to leaving  Vaccines refused and was signed by wife on admission  Discharge instructions and education given to patient and discussed about alcohol withdraw  Patient refusing rehab at this time

## 2018-10-17 NOTE — ASSESSMENT & PLAN NOTE
· Lipase appears to be chronically elevated when compared to previous labs  · Patient denies any abdominal pain, nausea, vomiting  · Cont to trend

## 2018-10-18 LAB — MRSA NOSE QL CULT: NORMAL

## 2018-12-18 ENCOUNTER — APPOINTMENT (OUTPATIENT)
Dept: LAB | Facility: HOSPITAL | Age: 48
End: 2018-12-18
Payer: OTHER MISCELLANEOUS

## 2018-12-18 ENCOUNTER — TRANSCRIBE ORDERS (OUTPATIENT)
Dept: ADMINISTRATIVE | Facility: HOSPITAL | Age: 48
End: 2018-12-18

## 2018-12-18 DIAGNOSIS — G89.4 CHRONIC PAIN SYNDROME: ICD-10-CM

## 2018-12-18 DIAGNOSIS — G89.4 CHRONIC PAIN SYNDROME: Primary | ICD-10-CM

## 2018-12-18 LAB
ALBUMIN SERPL BCP-MCNC: 3.7 G/DL (ref 3.5–5)
ALP SERPL-CCNC: 164 U/L (ref 46–116)
ALT SERPL W P-5'-P-CCNC: 260 U/L (ref 12–78)
ANION GAP SERPL CALCULATED.3IONS-SCNC: 13 MMOL/L (ref 4–13)
APTT PPP: 24 SECONDS (ref 26–38)
AST SERPL W P-5'-P-CCNC: 513 U/L (ref 5–45)
BILIRUB SERPL-MCNC: 1.6 MG/DL (ref 0.2–1)
BUN SERPL-MCNC: 9 MG/DL (ref 5–25)
CALCIUM SERPL-MCNC: 9.8 MG/DL (ref 8.3–10.1)
CHLORIDE SERPL-SCNC: 96 MMOL/L (ref 100–108)
CO2 SERPL-SCNC: 28 MMOL/L (ref 21–32)
CREAT SERPL-MCNC: 0.84 MG/DL (ref 0.6–1.3)
ERYTHROCYTE [DISTWIDTH] IN BLOOD BY AUTOMATED COUNT: 16.9 % (ref 11.6–15.1)
GFR SERPL CREATININE-BSD FRML MDRD: 103 ML/MIN/1.73SQ M
GLUCOSE SERPL-MCNC: 139 MG/DL (ref 65–140)
HCT VFR BLD AUTO: 36.8 % (ref 36.5–49.3)
HGB BLD-MCNC: 11.6 G/DL (ref 12–17)
INR PPP: 1.03 (ref 0.86–1.16)
MCH RBC QN AUTO: 29.4 PG (ref 26.8–34.3)
MCHC RBC AUTO-ENTMCNC: 31.5 G/DL (ref 31.4–37.4)
MCV RBC AUTO: 93 FL (ref 82–98)
PLATELET # BLD AUTO: 228 THOUSANDS/UL (ref 149–390)
PMV BLD AUTO: 10 FL (ref 8.9–12.7)
POTASSIUM SERPL-SCNC: 3.6 MMOL/L (ref 3.5–5.3)
PROT SERPL-MCNC: 8.4 G/DL (ref 6.4–8.2)
PROTHROMBIN TIME: 10.8 SECONDS (ref 9.4–11.7)
RBC # BLD AUTO: 3.94 MILLION/UL (ref 3.88–5.62)
SODIUM SERPL-SCNC: 137 MMOL/L (ref 136–145)
WBC # BLD AUTO: 4.57 THOUSAND/UL (ref 4.31–10.16)

## 2018-12-18 PROCEDURE — 85027 COMPLETE CBC AUTOMATED: CPT

## 2018-12-18 PROCEDURE — 36415 COLL VENOUS BLD VENIPUNCTURE: CPT

## 2018-12-18 PROCEDURE — 80053 COMPREHEN METABOLIC PANEL: CPT

## 2018-12-18 PROCEDURE — 85610 PROTHROMBIN TIME: CPT

## 2018-12-18 PROCEDURE — 85730 THROMBOPLASTIN TIME PARTIAL: CPT

## 2019-03-08 ENCOUNTER — OFFICE VISIT (OUTPATIENT)
Dept: NEUROLOGY | Facility: CLINIC | Age: 49
End: 2019-03-08
Payer: COMMERCIAL

## 2019-03-08 VITALS
HEART RATE: 125 BPM | DIASTOLIC BLOOD PRESSURE: 101 MMHG | BODY MASS INDEX: 22.65 KG/M2 | SYSTOLIC BLOOD PRESSURE: 152 MMHG | WEIGHT: 167 LBS

## 2019-03-08 DIAGNOSIS — M54.81 OCCIPITAL NEURALGIA OF LEFT SIDE: ICD-10-CM

## 2019-03-08 DIAGNOSIS — G43.719 INTRACTABLE CHRONIC MIGRAINE WITHOUT AURA AND WITHOUT STATUS MIGRAINOSUS: ICD-10-CM

## 2019-03-08 DIAGNOSIS — R20.0 BILATERAL LEG NUMBNESS: ICD-10-CM

## 2019-03-08 DIAGNOSIS — G95.0 SYRINX (HCC): Primary | ICD-10-CM

## 2019-03-08 PROCEDURE — 99215 OFFICE O/P EST HI 40 MIN: CPT | Performed by: PHYSICIAN ASSISTANT

## 2019-03-08 RX ORDER — KETOROLAC TROMETHAMINE 10 MG/1
TABLET, FILM COATED ORAL
Qty: 10 TABLET | Refills: 0 | Status: SHIPPED | OUTPATIENT
Start: 2019-03-08 | End: 2019-04-14 | Stop reason: SINTOL

## 2019-03-08 RX ORDER — ZONISAMIDE 25 MG/1
CAPSULE ORAL
Qty: 60 CAPSULE | Refills: 2 | Status: SHIPPED | OUTPATIENT
Start: 2019-03-08 | End: 2019-04-12 | Stop reason: SDUPTHER

## 2019-03-08 RX ORDER — ONDANSETRON 4 MG/1
TABLET, ORALLY DISINTEGRATING ORAL
Qty: 30 TABLET | Refills: 2 | Status: SHIPPED | OUTPATIENT
Start: 2019-03-08 | End: 2020-06-05

## 2019-03-08 NOTE — PROGRESS NOTES
Patient ID: Michele Moreira is a 51 y o  male  Assessment/Plan:     Problem List Items Addressed This Visit        Cardiovascular and Mediastinum    Intractable chronic migraine without aura and without status migrainosus    Relevant Medications    ketorolac (TORADOL) 10 mg tablet    ondansetron (ZOFRAN-ODT) 4 mg disintegrating tablet    zonisamide (ZONEGRAN) 25 mg capsule    Other Relevant Orders    MRI brain with and without contrast    MRI thoracic spine with and without contrast       Nervous and Auditory    Syrinx (HCC) - Primary    Relevant Orders    MRI brain with and without contrast    MRI thoracic spine with and without contrast       Other    Occipital neuralgia    Bilateral leg numbness    Relevant Orders    MRI brain with and without contrast    MRI thoracic spine with and without contrast           Chronic migraine with daily nausea/ emesis  Occipital neuralgia, L>R  Onset after neck related injury at work several years ago and causing syrinx with associated numbness in the legs  Considering syrinx possibly extends into the Thoracic spine on report, will order T spine MRI and brain MRI to r/o chiari  However I would like to review the imaging when uploaded into PACS rather than rely on the report I have  Plan:  · Zonisamide for migraine prevention (I wanted to try topamax however pt already with significant weight loss)- side effects reviewed  I asked his wife to call me back if he notices any weight loss, worsening numbness or tingling with the medication or worsening headaches when he stands  He was told to continue hydration with at least 1-2 L water daily to prevent kidney stone formation  · Toradol when migraine is severe, to take with antacid, food considering gastrectomy; could give him home Rx injections if needed  · Zofran ODT orn nausea/ emesis  · aimovig failed x2  He does not want to continue  · He will consider botox but difficulty with WC coverage    He certainly qualifies for Botox injections every 3 months on the basis daily migraine headaches lasting greater than 4 hours each time  He was encouraged to call prior to his follow-up if needed  Subjective:    HPI    Mr Ivy Davila is a 54-year-old right-handed male who is here with his wife for neurological follow-up for headaches  He used to work for [employer] and his neck was injured on the job  He states he was pulling something very heavy and he felt at and immediately pain in his neck and numbness in his arms and legs  This occurred at least 7 years ago and he is using workers comp ever since  He his wife feel that he is not receiving adequate treatment because his headaches are persistent despite seen several specialists, however the [employer] comp is not allowing him to see a neurologist so he has to see us as a self pay patient  His headaches are daily and nausea and vomiting is daily as well  He states there are some days without vomiting, but most days he vomits several times per day, to 8-10 times per day  He states vomiting makes his headaches worse  Headache pain is in the back of the head and radiates to the apex sometimes  Can be on the left or the right or both  It feels like a burning sensation and needles in his head, or like a torch is held over the head  These are 10/10 every day per his history  He cannot sleep well due to the pain and n/emesis  States sometimes headaches are worse when he stands up, but not necessarily improved when he lays down  Headaches are overall worse with strenuous activities  Aura- denies  Triggers- unsure, headache always there    Meds tried:  Prevention: Tried Aimovig last time x2 injections and it was not helpful, Cymbalta, Trileptal, Tegretol, verapamil, magnesium  Abortive:   Toradol, Fiorinal, Tylenol, ibuprofen, Reglan, narcotic- in hospital, tramadol- in hospital    He has been following with  Neurosx Dr Joanne Martins with [facility] [phone], in ? [place]  -- recently told the patient that his syrinx has gotten smaller and there is no need to proceed with any further treatment or surgical procedure  Cervical MRI without contrast 12/21/2018, report scanned into media tab and disc being uploaded currently into PACS  Impression:  · C3-4 intact central canal   Mild to moderate foraminal narrowing on the left, patent on the right  C4-5 with intact central canal and foramina  C5-6 with intact central canal and patent foramen  C6-7 with intact central canal   Mild bilateral foraminal narrowing  Addendum:  · No abnormal foci of enhancement within the cord parenchyma or surrounding epidural space  Tiny 1 8 cm cystic dilation of the central canal present on prior study remains quiescent  · No diffuse or focal areas of cord enhancement post gadolinium for within the surrounding epidural space  In particular the tiny 1 8 cm cystic dilation extending from C4-C7 T1 remains quiescent  An apparent focus of increased signal on prior study about the T2-3 level on one view only, not present on earlier studies, is unremarkable on pre and postcontrast imaging  He has a long history of GI issues, including history of gastric cancer s/p gastro jejunostomy and complete gastrectomy due to recurrence of tumor at the margins  Gastrectomy was done at [facility] about 7 years ago per his history  The patient tells me that he developed ulcers after 2 weeks daily NSAID use ? Naproxen her a worker's comp doctor who try to treat his neck pain, subsequently leading to the cancer  ???     See recent admissions- last year- for depression, alcohol withdrawal, acute pancreatitis, etc     The following portions of the patient's history were reviewed and updated as appropriate:   He  has a past medical history of Anemia, Balance problems, Cancer of stomach ([address] ), Hypertension, Insomnia, Occipital neuralgia, Other mechanical complication of implanted electronic neurostimulator of spinal cord electrode (lead), sequela, Syrinx of spinal cord ([facility] ), and Vitamin D deficiency  He   Patient Active Problem List    Diagnosis Date Noted    Bilateral leg numbness 03/12/2019    Intractable chronic migraine without aura and without status migrainosus 03/12/2019    Syrinx ([facility] ) 03/12/2019    Major depressive disorder with single episode 10/17/2018    Alcohol withdrawal ([facility] ) 10/16/2018    Abnormal transaminases 10/16/2018    Abnormal serum level of lipase 10/16/2018    Pancytopenia ([facility] ) 04/03/2018    History of gastric cancer     Acute pancreatitis without infection or necrosis 03/31/2018    Occipital neuralgia 03/31/2018    Essential hypertension 03/31/2018     He  has a past surgical history that includes Back surgery; Gastrectomy; Abdominal surgery; and Insertion / placement / revision neurostimulator  His family history includes Esophageal cancer in his father; Hypertension in his mother; No Known Problems in his brother  He  reports that he has quit smoking  He has never used smokeless tobacco  He reports that he drinks alcohol  He reports that he does not use drugs  Current Outpatient Medications   Medication Sig Dispense Refill    Ascorbic Acid (VITAMIN C) 1000 MG tablet Take 2,000 mg by mouth daily      Cyanocobalamin (VITAMIN B 12 PO) Take by mouth      DULoxetine (CYMBALTA) 20 mg capsule Take 1 capsule (20 mg total) by mouth 2 (two) times a day 30 capsule 0    FERROUS SULFATE PO Take 220 mg by mouth daily      magnesium oxide (MAG-OX) 400 mg Take 1 tablet (400 mg total) by mouth daily  0    verapamil (CALAN-SR) 120 mg CR tablet Take 1 tablet (120 mg total) by mouth 2 (two) times a day 60 tablet 3    butalbital-aspirin-caffeine (FIORINAL) -40 mg per capsule Take 1 capsule by mouth 3 (three) times a day as needed for headaches      ketorolac (TORADOL) 10 mg tablet 1 tab q6-8 hours prn migraine   No more than 2-3 per week  10 tablet 0    ondansetron (ZOFRAN-ODT) 4 mg disintegrating tablet 1 tab q4-6 hours prn nausea/ vomiting  30 tablet 2    OXcarbazepine (TRILEPTAL) 150 mg tablet Take 1 tablet (150 mg total) by mouth 2 (two) times a day (Patient not taking: Reported on 3/8/2019) 60 tablet 1    zonisamide (ZONEGRAN) 25 mg capsule 1 tab qhs x 5 days, then 2 tabs qhs 60 capsule 2     No current facility-administered medications for this visit  He is allergic to carbamazepine            Objective:    Blood pressure (!) 152/101, pulse (!) 125, weight 75 8 kg (167 lb)  BP on recheck at end of appt is 120/65, HR still elevated 105  Physical Exam   Neurological:   Reflex Scores:       Bicep reflexes are 2+ on the right side and 3+ on the left side  Brachioradialis reflexes are 2+ on the right side and 3+ on the left side  Patellar reflexes are 2+ on the right side and 3+ on the left side  Achilles reflexes are 2+ on the right side and 2+ on the left side  Neurological Exam    Reflexes                                           Right                      Left  Brachioradialis                    2+                         3+  Biceps                                 2+                         3+  Patellar                                2+                         3+  Achilles                                2+                         2+  Plantar                           Downgoing                Equivocal  Possible upgoing toe at first on the left, then on retesting it is equivocal   [place] sign on left       Vital signs reviewed  Well developed, well nourished  Head: Normocephalic, atraumatic  CN 5-15: intact and symmetric, including EOMs which are normal b/l and PERRL  Fundi b/l are normal to crude ophthalmological examination    MSK:  There is some give-way weakness which makes it difficult to assess, however on direct testing there seems to be more weakness on the left side in the shoulder, finger abduction, left hip flexors and left dorsiflexion  ROM normal x all 4 extr  No pronator drift  Sensation:  Temperature is diminished at the mid shin and ankles bilaterally  Romberg with sway  Coordination: Nml x4 extr  Gait: Steady normal gait  ROS:    Review of Systems   Constitutional: Negative  HENT: Positive for tinnitus (right ear)  Eyes: Negative  Respiratory: Negative  Cardiovascular: Negative  Gastrointestinal: Positive for nausea and vomiting  Endocrine: Negative  Genitourinary: Negative  Musculoskeletal: Positive for back pain, gait problem (instability in AM, difficulty walking) and neck pain  Skin: Negative  Allergic/Immunologic: Negative  Neurological: Positive for weakness, numbness (arm/hands) and headaches  RIGHT FACIAL PAIN, Balance problems, Falls     Hematological: Negative  Psychiatric/Behavioral: Negative  The following portions of the patient's history were reviewed and updated as appropriate: allergies, current medications/ medication history, past family history, past medical history, past social history, past surgical history and problem list     Review of systems was reviewed and otherwise unremarkable from a neurological perspective

## 2019-03-08 NOTE — LETTER
March 12, 2019     Nolan Blue  [address] [facility] [address]    Patient: Vanna Arthur   YOB: 1970   Date of Visit: 3/8/2019       To Whom It May Concern:    Vanna Arthur in his wife came for neurological follow-up on 3/8/2019 at which point his headaches were addressed and diagnosis intractable chronic migraine and occipital neuralgia  He qualifies for an would benefit from botulinum toxin injections for the prevention of chronic migraines q 3 months because he has daily migraine headaches lasting greater than 4 hours each time  He has also tried and failed several different preventative medications as noted in the chart  Considering possible syrinx in the thoracic spine and increased susceptibility for Chiari malformation, I am ordering as her affect spine MRI with and without contrast and a brain MRI with and without contrast, respectively  Problem List Items Addressed This Visit        Cardiovascular and Mediastinum    Intractable chronic migraine without aura and without status migrainosus    Relevant Medications    ketorolac (TORADOL) 10 mg tablet    ondansetron (ZOFRAN-ODT) 4 mg disintegrating tablet    zonisamide (ZONEGRAN) 25 mg capsule    Other Relevant Orders    MRI brain with and without contrast    MRI thoracic spine with and without contrast       Nervous and Auditory    Syrinx (HCC) - Primary    Relevant Orders    MRI brain with and without contrast    MRI thoracic spine with and without contrast       Other    Occipital neuralgia    Bilateral leg numbness    Relevant Orders    MRI brain with and without contrast    MRI thoracic spine with and without contrast          If you have questions, please do not hesitate to call me           Sincerely,        Azam Saenz PA-C        CC: No Recipients

## 2019-03-11 ENCOUNTER — TELEPHONE (OUTPATIENT)
Dept: NEUROLOGY | Facility: CLINIC | Age: 49
End: 2019-03-11

## 2019-03-11 NOTE — LETTER
March 12, 2019     Nolan Blue  [address] [facility] [address]    Patient: Laney Sharma   YOB: 1970   Date of Visit: 3/11/2019       To Whom It May Concern:    Mr  Laney Sharma and his wife came for neurological follow-up on 3/8/2019 at which point his headaches were addressed and diagnosed as intractable chronic migraine and occipital neuralgia  He qualifies for and would benefit from botulinum toxin injections for the prevention of chronic migraines q 3 months because he has daily migraine headaches lasting greater than 4 hours each time  He has also tried and failed several different preventative medications as noted in the chart      Considering possible syrinx in the thoracic spine and increased susceptibility for Chiari malformation, I am ordering a thoracic spine MRI with and without contrast and a brain MRI with and without contrast, respectively  If you have questions, please do not hesitate to call me  I look forward to following your patient along with you  Sincerely,        Farida Christianson PA-C      CC: No Recipients

## 2019-03-11 NOTE — TELEPHONE ENCOUNTER
Pt's wife called requesting status of letter/note with findings and medical necessity for studies that you wanted completed (blood work, MRI?)  Also, they  would like you to reach out to KARINA Chavez [address] Valentino Harold with [facility] [phone] as previously discussed  [place] [phone]

## 2019-03-12 PROBLEM — G43.719 INTRACTABLE CHRONIC MIGRAINE WITHOUT AURA AND WITHOUT STATUS MIGRAINOSUS: Status: ACTIVE | Noted: 2019-03-12

## 2019-03-12 PROBLEM — R20.0 BILATERAL LEG NUMBNESS: Status: ACTIVE | Noted: 2019-03-12

## 2019-03-12 PROBLEM — G95.0 SYRINX (HCC): Status: ACTIVE | Noted: 2019-03-12

## 2019-03-12 NOTE — TELEPHONE ENCOUNTER
I wrote letter- you can send it to them if they are requesting it  I need to wait until I can view imaging in PACS prior to calling anyone

## 2019-04-12 ENCOUNTER — OFFICE VISIT (OUTPATIENT)
Dept: NEUROLOGY | Facility: CLINIC | Age: 49
End: 2019-04-12
Payer: COMMERCIAL

## 2019-04-12 VITALS
SYSTOLIC BLOOD PRESSURE: 145 MMHG | BODY MASS INDEX: 23.33 KG/M2 | WEIGHT: 172 LBS | DIASTOLIC BLOOD PRESSURE: 92 MMHG | HEART RATE: 98 BPM

## 2019-04-12 DIAGNOSIS — M54.81 OCCIPITAL NEURALGIA OF LEFT SIDE: ICD-10-CM

## 2019-04-12 DIAGNOSIS — G43.719 INTRACTABLE CHRONIC MIGRAINE WITHOUT AURA AND WITHOUT STATUS MIGRAINOSUS: Primary | ICD-10-CM

## 2019-04-12 DIAGNOSIS — G95.0 SYRINX (HCC): ICD-10-CM

## 2019-04-12 PROCEDURE — 99214 OFFICE O/P EST MOD 30 MIN: CPT | Performed by: PHYSICIAN ASSISTANT

## 2019-04-12 RX ORDER — ZONISAMIDE 25 MG/1
CAPSULE ORAL
Qty: 180 CAPSULE | Refills: 3 | Status: SHIPPED | OUTPATIENT
Start: 2019-04-12 | End: 2019-07-19 | Stop reason: SDUPTHER

## 2019-04-12 RX ORDER — SUMATRIPTAN 100 MG/1
TABLET, FILM COATED ORAL
Qty: 9 TABLET | Refills: 0 | Status: SHIPPED | OUTPATIENT
Start: 2019-04-12

## 2019-04-22 ENCOUNTER — OFFICE VISIT (OUTPATIENT)
Dept: BEHAVIORAL/MENTAL HEALTH CLINIC | Facility: CLINIC | Age: 49
End: 2019-04-22
Payer: COMMERCIAL

## 2019-04-22 DIAGNOSIS — F41.1 GAD (GENERALIZED ANXIETY DISORDER): ICD-10-CM

## 2019-04-22 DIAGNOSIS — F33.1 MODERATE EPISODE OF RECURRENT MAJOR DEPRESSIVE DISORDER (HCC): Primary | ICD-10-CM

## 2019-04-22 PROCEDURE — 90792 PSYCH DIAG EVAL W/MED SRVCS: CPT | Performed by: PSYCHIATRY & NEUROLOGY

## 2019-04-22 RX ORDER — CITALOPRAM 20 MG/1
20 TABLET ORAL DAILY
Qty: 15 TABLET | Refills: 0 | Status: SHIPPED | OUTPATIENT
Start: 2019-05-06 | End: 2019-05-20 | Stop reason: ALTCHOICE

## 2019-04-22 RX ORDER — CITALOPRAM 10 MG/1
10 TABLET ORAL DAILY
Qty: 15 TABLET | Refills: 0 | Status: SHIPPED | OUTPATIENT
Start: 2019-04-22 | End: 2019-05-20 | Stop reason: ALTCHOICE

## 2019-04-29 ENCOUNTER — TELEPHONE (OUTPATIENT)
Dept: NEUROLOGY | Facility: CLINIC | Age: 49
End: 2019-04-29

## 2019-05-20 ENCOUNTER — OFFICE VISIT (OUTPATIENT)
Dept: BEHAVIORAL/MENTAL HEALTH CLINIC | Facility: CLINIC | Age: 49
End: 2019-05-20
Payer: COMMERCIAL

## 2019-05-20 DIAGNOSIS — F41.1 GAD (GENERALIZED ANXIETY DISORDER): ICD-10-CM

## 2019-05-20 DIAGNOSIS — F32.1 CURRENT MODERATE EPISODE OF MAJOR DEPRESSIVE DISORDER WITHOUT PRIOR EPISODE (HCC): Primary | ICD-10-CM

## 2019-05-20 PROCEDURE — 99213 OFFICE O/P EST LOW 20 MIN: CPT | Performed by: PSYCHIATRY & NEUROLOGY

## 2019-05-20 RX ORDER — DULOXETIN HYDROCHLORIDE 20 MG/1
20 CAPSULE, DELAYED RELEASE ORAL DAILY
Qty: 45 CAPSULE | Refills: 0 | Status: SHIPPED | OUTPATIENT
Start: 2019-05-20 | End: 2019-06-17

## 2019-05-20 RX ORDER — CLONAZEPAM 0.25 MG/1
0.25 TABLET, ORALLY DISINTEGRATING ORAL 2 TIMES DAILY PRN
Qty: 60 TABLET | Refills: 0 | Status: SHIPPED | OUTPATIENT
Start: 2019-05-20 | End: 2019-06-17

## 2019-05-21 ENCOUNTER — TELEPHONE (OUTPATIENT)
Dept: NEUROLOGY | Facility: CLINIC | Age: 49
End: 2019-05-21

## 2019-06-17 ENCOUNTER — OFFICE VISIT (OUTPATIENT)
Dept: BEHAVIORAL/MENTAL HEALTH CLINIC | Facility: CLINIC | Age: 49
End: 2019-06-17
Payer: COMMERCIAL

## 2019-06-17 DIAGNOSIS — F41.1 GAD (GENERALIZED ANXIETY DISORDER): ICD-10-CM

## 2019-06-17 DIAGNOSIS — F32.1 CURRENT MODERATE EPISODE OF MAJOR DEPRESSIVE DISORDER WITHOUT PRIOR EPISODE (HCC): Primary | ICD-10-CM

## 2019-06-17 PROCEDURE — 99213 OFFICE O/P EST LOW 20 MIN: CPT | Performed by: PSYCHIATRY & NEUROLOGY

## 2019-06-17 RX ORDER — DULOXETIN HYDROCHLORIDE 60 MG/1
60 CAPSULE, DELAYED RELEASE ORAL DAILY
Qty: 30 CAPSULE | Refills: 0 | Status: SHIPPED | OUTPATIENT
Start: 2019-06-17 | End: 2019-08-07 | Stop reason: SDUPTHER

## 2019-06-17 RX ORDER — CLONAZEPAM 0.5 MG/1
0.5 TABLET ORAL 2 TIMES DAILY
Qty: 60 TABLET | Refills: 0 | Status: SHIPPED | OUTPATIENT
Start: 2019-06-17 | End: 2019-07-16 | Stop reason: SDUPTHER

## 2019-07-16 DIAGNOSIS — F41.1 GAD (GENERALIZED ANXIETY DISORDER): ICD-10-CM

## 2019-07-16 RX ORDER — CLONAZEPAM 0.5 MG/1
TABLET ORAL
Qty: 60 TABLET | Refills: 0 | Status: SHIPPED | OUTPATIENT
Start: 2019-07-16 | End: 2019-08-07 | Stop reason: SDUPTHER

## 2019-07-19 ENCOUNTER — OFFICE VISIT (OUTPATIENT)
Dept: NEUROLOGY | Facility: CLINIC | Age: 49
End: 2019-07-19
Payer: COMMERCIAL

## 2019-07-19 VITALS
SYSTOLIC BLOOD PRESSURE: 145 MMHG | WEIGHT: 170 LBS | BODY MASS INDEX: 23.06 KG/M2 | HEART RATE: 89 BPM | DIASTOLIC BLOOD PRESSURE: 100 MMHG

## 2019-07-19 DIAGNOSIS — S09.90XS INJURY OF HEAD, SEQUELA: ICD-10-CM

## 2019-07-19 DIAGNOSIS — G95.0 SYRINX (HCC): Primary | ICD-10-CM

## 2019-07-19 DIAGNOSIS — M54.81 OCCIPITAL NEURALGIA OF LEFT SIDE: ICD-10-CM

## 2019-07-19 DIAGNOSIS — G43.719 INTRACTABLE CHRONIC MIGRAINE WITHOUT AURA AND WITHOUT STATUS MIGRAINOSUS: ICD-10-CM

## 2019-07-19 PROCEDURE — 99215 OFFICE O/P EST HI 40 MIN: CPT | Performed by: PHYSICIAN ASSISTANT

## 2019-07-19 RX ORDER — ZONISAMIDE 50 MG/1
CAPSULE ORAL
Qty: 60 CAPSULE | Refills: 2 | Status: SHIPPED | OUTPATIENT
Start: 2019-07-19 | End: 2019-12-02 | Stop reason: SDUPTHER

## 2019-07-19 NOTE — PROGRESS NOTES
Patient ID: Epifanio Cee is a 47 y o  male  Assessment/Plan:       Problem List Items Addressed This Visit        Cardiovascular and Mediastinum    Intractable chronic migraine without aura and without status migrainosus    Relevant Medications    zonisamide (ZONEGRAN) 50 MG capsule       Nervous and Auditory    Syrinx (HCC) - Primary       Other    Occipital neuralgia    Relevant Medications    zonisamide (ZONEGRAN) 50 MG capsule    Head injury           Zonisamide seems to be helpful  He will continue and increase to 50/100 x1 week, then 100 mg b i d   Side effects reviewed  Told to stay hydrated while on the medication  If he develops overheating while in the sun, as noted below, he should let me know  He feels that this is a side effect of zonegran  Repeat Brain MRI planned per other Neurology group, per [employer]  He continues to have some balance difficulty  We discussed the possibility of this syrinx in the cervical spine affecting his balance, muscle strength and sensations  He should proceed with physical therapy as planned, specifically balance therapy  He agreed to bring in his last EMG results  If needed, we can update this depending on the results of the last EMG  Rec continue to f/u with psychiatry Dr Young Evans  The patient should not hesitate to call me prior to his follow up with any questions or concerns  The patient was instructed to urgently call 911 or present to the nearest emergency room with any new or worsening neurological deficits  Subjective:    HPI    Mr Nolan Blue is a 59-year-old right-handed male who is here, accompanied by his wife who provides some history, for neurological follow-up for headaches and neck pain s/p injury   He used to work for [employer] and his neck was injured on the job        He is still doing well with zonegran and denies any significant side effects    He is asking if it would be appropriate to increase the dose since he continues to have residual headaches and neck pain  Since starting it he has had a drastic improvement to resolve of nausea and vomiting which enabled him to gain weight in a positive way  With the zonisamide he states that he has some heat and light sensitivity  He states that his face will become very hot and red when he goes out in the sun for too long, and this started after he started the medication  He is not overly bothered by the side effect and wants to continue the medicine  He states he will try to use more sunblock and ice packs, hats, etc       He plans to try physical therapy soon  He continues to see Neurology through [facility] at a different location  They did order a repeat brain MRI which will be done soon, and PT  He tried Toradol p r n  Migraine headache but this caused confusion and a feeling of altered consciousness so he decided not to take it anymore  Of note repeat C and T-spine MRIs were repeated in June 2019  There is a noted fusion C5-6 and C6-7  There continues to be very small possible syrinx extending a long length, however not specifically noted on the MRI, and possibly artifact per MRI report  Thoracic spine is unremarkable for syrinx      Prior documentation:  He states he was pulling something very heavy and he felt at and immediately pain in his neck and numbness in his arms and legs   This occurred at least 7 years ago and he is using workers comp ever since   He his wife feel that he is not receiving adequate treatment because his headaches are persistent despite seen several specialists, however the [employer] is not allowing him to see a neurologist so he has to see us as a self pay patient      His headaches are daily and nausea and vomiting is daily as well  Tabitha Alvarez states there are some days without vomiting, but most days he vomits several times per day, to 8-10 times per day   He states vomiting makes his headaches worse   Headache pain is in the back of the head and radiates to the apex sometimes   Can be on the left or the right or both   It feels like a burning sensation and needles in his head, or like a torch is held over the head   These are 10/10 every day per his history   He cannot sleep well due to the pain and n/emesis      States sometimes headaches are worse when he stands up, but not necessarily improved when he lays down   Headaches are overall worse with strenuous activities      Aura- denies  Triggers- unsure, headache always there     Meds tried:  Prevention: Tried Aimovig last time x2 injections and it was not helpful, Cymbalta, Trileptal, Tegretol, verapamil, magnesium, zonisamide  Abortive:  Toradol, Fiorinal, Tylenol, ibuprofen, Reglan, narcotic- in hospital, tramadol- in hospital     He has been following [id number] [facility] Dr Demarco Sewell with [facility] [phone], in [place]  -- recently told the patient that his syrinx has gotten smaller and there is no need to proceed with any further treatment or surgical procedure      Cervical MRI without contrast 12/21/2018, report scanned into media tab and disc being uploaded currently into PACS  Impression:  · C3-4 intact central canal   Mild to moderate foraminal narrowing on the left, patent on the right   C4-5 with intact central canal and foramina   C5-6 with intact central canal and patent foramen   C6-7 with intact central canal   Mild bilateral foraminal narrowing      Addendum:  · No abnormal foci of enhancement within the cord parenchyma or surrounding epidural space   Tiny 1 8 cm cystic dilation of the central canal present on prior study remains quiescent    · No diffuse or focal areas of cord enhancement post gadolinium for within the surrounding epidural space   In particular the tiny 1 8 cm cystic dilation extending from C4-C7 T1 remains quiescent   An apparent focus of increased signal on prior study about the T2-3 level on one view only, not present on earlier studies, is unremarkable on pre and postcontrast imaging      He has a long history of GI issues, including history of gastric cancer s/p gastro jejunostomy and complete gastrectomy due to recurrence of tumor at the margins   Gastrectomy was done at [facility] about 7 years ago per his history   The patient tells me that he developed ulcers after 2 weeks daily NSAID use ? Naproxen her a worker's comp doctor who try to treat his neck pain, subsequently leading to the cancer  ???     See recent admissions- last year- for depression, alcohol withdrawal, acute pancreatitis, etc     The following portions of the patient's history were reviewed and updated as appropriate: He  has a past medical history of Anemia, Balance problems, Cancer of stomach ([address] ), Hypertension, Insomnia, Occipital neuralgia, Other mechanical complication of implanted electronic neurostimulator of spinal cord electrode (lead), sequela, Syrinx of spinal cord ([address] ), and Vitamin D deficiency  He   Patient Active Problem List    Diagnosis Date Noted    Head injury 07/23/2019    HENRY (generalized anxiety disorder) 05/20/2019    Bilateral leg numbness 03/12/2019    Intractable chronic migraine without aura and without status migrainosus 03/12/2019    Syrinx ([address] ) 03/12/2019    Major depressive disorder with single episode 10/17/2018    Alcohol withdrawal ([address] ) 10/16/2018    Abnormal transaminases 10/16/2018    Abnormal serum level of lipase 10/16/2018    Pancytopenia ([address] ) 04/03/2018    History of gastric cancer     Acute pancreatitis without infection or necrosis 03/31/2018    Occipital neuralgia 03/31/2018    Essential hypertension 03/31/2018     He  has a past surgical history that includes Back surgery; Gastrectomy; Abdominal surgery; and Insertion / placement / revision neurostimulator  His family history includes Esophageal cancer in his father; Hypertension in his mother; No Known Problems in his brother    He reports that he has quit smoking  He has never used smokeless tobacco  He reports that he drinks alcohol  He reports that he does not use drugs  Current Outpatient Medications   Medication Sig Dispense Refill    Ascorbic Acid (VITAMIN C) 1000 MG tablet Take 2,000 mg by mouth daily      butalbital-aspirin-caffeine (FIORINAL) -40 mg per capsule Take 1 capsule by mouth 3 (three) times a day as needed for headaches      clonazePAM (KlonoPIN) 0 5 mg tablet TAKE 1 TABLET BY MOUTH TWICE DAILY 60 tablet 0    Cyanocobalamin (VITAMIN B 12 PO) Take by mouth      DULoxetine (CYMBALTA) 60 mg delayed release capsule Take 1 capsule (60 mg total) by mouth daily Sprinkle contents of capsule onto apple sauce and take by mouth 30 capsule 0    FERROUS SULFATE PO Take 220 mg by mouth daily      magnesium oxide (MAG-OX) 400 mg Take 1 tablet (400 mg total) by mouth daily  0    ondansetron (ZOFRAN-ODT) 4 mg disintegrating tablet 1 tab q4-6 hours prn nausea/ vomiting  30 tablet 2    zonisamide (ZONEGRAN) 50 MG capsule One capsule q a m  And 2 capsules q h s  x1 week, then 2 capsules b i d  60 capsule 2    SUMAtriptan (IMITREX) 100 mg tablet One tab at migraine onset, repeat after 2 hr if needed  No more than 2/24 hours or 3 per week  9 tablet 0     No current facility-administered medications for this visit  He is allergic to carbamazepine            Objective:    Blood pressure 145/100, pulse 89, weight 77 1 kg (170 lb)  Body mass index is 23 06 kg/m²  Physical Exam    Neurological Exam  Vital signs reviewed  Well developed, well nourished  Head: Normocephalic, atraumatic  CN 9-46: intact and symmetric, including EOMs which are normal b/l and PERRL  MSK:  There is some give-way weakness diffusely, worse on the left shoulder, finger abduction, left hip flexors and left dorsiflexion  ROM normal x all 4 extr  No pronator drift  Again difficult to assess muscle strength due to poor effort    He also has a physiologic tremor in all 4 extremities, mild, with intention and posture  Reflexes:  Brisk throughout, slightly more brisk on the left U and L extr's  Sensation:  Temperature is diminished at the mid shin and ankles bilaterally   Romberg with mild sway  Coordination: Nml x4 extr  Gait: Steady normal gait  ROS:    Review of Systems   Constitutional: Negative  Negative for appetite change and fever  HENT: Negative  Negative for hearing loss, tinnitus, trouble swallowing and voice change  Eyes: Negative  Negative for photophobia and pain  Respiratory: Negative  Negative for shortness of breath  Cardiovascular: Negative  Negative for palpitations  Gastrointestinal: Negative  Negative for nausea and vomiting  Endocrine: Negative  Negative for cold intolerance and heat intolerance  Genitourinary: Negative  Negative for dysuria, frequency and urgency  Musculoskeletal: Positive for neck pain  Negative for myalgias  Skin: Negative  Negative for rash  Neurological: Positive for headaches  Negative for dizziness, tremors, seizures, syncope, facial asymmetry, speech difficulty, weakness, light-headedness and numbness  Hematological: Negative  Does not bruise/bleed easily  Psychiatric/Behavioral: Negative  Negative for confusion, hallucinations and sleep disturbance  The following portions of the patient's history were reviewed and updated as appropriate: allergies, current medications/ medication history, past family history, past medical history, past social history, past surgical history and problem list     Review of systems was reviewed and otherwise unremarkable from a neurological perspective

## 2019-07-23 PROBLEM — S09.90XA HEAD INJURY: Status: ACTIVE | Noted: 2019-07-23

## 2019-08-07 ENCOUNTER — OFFICE VISIT (OUTPATIENT)
Dept: BEHAVIORAL/MENTAL HEALTH CLINIC | Facility: CLINIC | Age: 49
End: 2019-08-07
Payer: COMMERCIAL

## 2019-08-07 DIAGNOSIS — F32.1 CURRENT MODERATE EPISODE OF MAJOR DEPRESSIVE DISORDER WITHOUT PRIOR EPISODE (HCC): ICD-10-CM

## 2019-08-07 DIAGNOSIS — F41.1 GAD (GENERALIZED ANXIETY DISORDER): Primary | ICD-10-CM

## 2019-08-07 DIAGNOSIS — G47.00 INSOMNIA, UNSPECIFIED TYPE: ICD-10-CM

## 2019-08-07 PROCEDURE — 99213 OFFICE O/P EST LOW 20 MIN: CPT | Performed by: PSYCHIATRY & NEUROLOGY

## 2019-08-07 RX ORDER — CLONAZEPAM 0.5 MG/1
0.5 TABLET ORAL 2 TIMES DAILY
Qty: 60 TABLET | Refills: 2 | Status: SHIPPED | OUTPATIENT
Start: 2019-08-07 | End: 2019-12-26 | Stop reason: SDUPTHER

## 2019-08-07 RX ORDER — MIRTAZAPINE 15 MG/1
7.5 TABLET, FILM COATED ORAL
Qty: 15 TABLET | Refills: 1 | Status: SHIPPED | OUTPATIENT
Start: 2019-08-07

## 2019-08-07 RX ORDER — DULOXETIN HYDROCHLORIDE 60 MG/1
60 CAPSULE, DELAYED RELEASE ORAL DAILY
Qty: 30 CAPSULE | Refills: 2 | Status: SHIPPED | OUTPATIENT
Start: 2019-08-07
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
## 2019-08-08 ENCOUNTER — TELEPHONE (OUTPATIENT)
Dept: FAMILY MEDICINE CLINIC | Facility: CLINIC | Age: 49
End: 2019-08-08

## 2019-08-08 NOTE — TELEPHONE ENCOUNTER
Patient is at [address] [facility] and remeron directions are unclear  Pharmacy needs clarification

## 2019-10-16 ENCOUNTER — APPOINTMENT (EMERGENCY)
Dept: RADIOLOGY | Facility: HOSPITAL | Age: 49
End: 2019-10-16
Payer: COMMERCIAL

## 2019-10-16 ENCOUNTER — HOSPITAL ENCOUNTER (OUTPATIENT)
Facility: HOSPITAL | Age: 49
Setting detail: OBSERVATION
Discharge: HOME | End: 2019-10-17
Attending: EMERGENCY MEDICINE | Admitting: STUDENT IN AN ORGANIZED HEALTH CARE EDUCATION/TRAINING PROGRAM
Payer: COMMERCIAL

## 2019-10-16 DIAGNOSIS — F10.10 ALCOHOL ABUSE: ICD-10-CM

## 2019-10-16 DIAGNOSIS — F10.929 ALCOHOL INTOXICATION (HCC): Primary | ICD-10-CM

## 2019-10-16 PROBLEM — F32.A ANXIETY AND DEPRESSION: Status: ACTIVE | Noted: 2019-10-16

## 2019-10-16 PROBLEM — F41.9 ANXIETY AND DEPRESSION: Status: ACTIVE | Noted: 2019-10-16

## 2019-10-16 PROBLEM — K85.90 ACUTE PANCREATITIS WITHOUT INFECTION OR NECROSIS: Status: RESOLVED | Noted: 2018-03-31 | Resolved: 2019-10-16

## 2019-10-16 LAB
ALBUMIN SERPL BCP-MCNC: 3.8 G/DL (ref 3.5–5)
ALP SERPL-CCNC: 143 U/L (ref 46–116)
ALT SERPL W P-5'-P-CCNC: 50 U/L (ref 12–78)
AMPHETAMINES SERPL QL SCN: NEGATIVE
ANION GAP SERPL CALCULATED.3IONS-SCNC: 15 MMOL/L (ref 4–13)
APTT PPP: 27 SECONDS (ref 23–37)
AST SERPL W P-5'-P-CCNC: 71 U/L (ref 5–45)
BARBITURATES UR QL: NEGATIVE
BASOPHILS # BLD MANUAL: 0 THOUSAND/UL (ref 0–0.1)
BASOPHILS NFR MAR MANUAL: 0 % (ref 0–1)
BENZODIAZ UR QL: NEGATIVE
BILIRUB SERPL-MCNC: 2.3 MG/DL (ref 0.2–1)
BILIRUB UR QL STRIP: NEGATIVE
BUN SERPL-MCNC: 8 MG/DL (ref 5–25)
CALCIUM SERPL-MCNC: 7.8 MG/DL (ref 8.3–10.1)
CHLORIDE SERPL-SCNC: 107 MMOL/L (ref 100–108)
CLARITY UR: CLEAR
CO2 SERPL-SCNC: 26 MMOL/L (ref 21–32)
COCAINE UR QL: NEGATIVE
COLOR UR: NORMAL
CREAT SERPL-MCNC: 0.71 MG/DL (ref 0.6–1.3)
EOSINOPHIL # BLD MANUAL: 0 THOUSAND/UL (ref 0–0.4)
EOSINOPHIL NFR BLD MANUAL: 0 % (ref 0–6)
ERYTHROCYTE [DISTWIDTH] IN BLOOD BY AUTOMATED COUNT: 16.5 % (ref 11.6–15.1)
ETHANOL SERPL-MCNC: 436 MG/DL (ref 0–3)
GFR SERPL CREATININE-BSD FRML MDRD: 109 ML/MIN/1.73SQ M
GLUCOSE SERPL-MCNC: 107 MG/DL (ref 65–140)
GLUCOSE UR STRIP-MCNC: NEGATIVE MG/DL
HCT VFR BLD AUTO: 41 % (ref 36.5–49.3)
HGB BLD-MCNC: 12.9 G/DL (ref 12–17)
HGB UR QL STRIP.AUTO: NEGATIVE
INR PPP: 1.08 (ref 0.91–1.09)
KETONES UR STRIP-MCNC: NEGATIVE MG/DL
LEUKOCYTE ESTERASE UR QL STRIP: NEGATIVE
LG PLATELETS BLD QL SMEAR: PRESENT
LIPASE SERPL-CCNC: 223 U/L (ref 73–393)
LYMPHOCYTES # BLD AUTO: 3.04 THOUSAND/UL (ref 0.6–4.47)
LYMPHOCYTES # BLD AUTO: 68 % (ref 14–44)
MAGNESIUM SERPL-MCNC: 2.1 MG/DL (ref 1.6–2.6)
MCH RBC QN AUTO: 29.2 PG (ref 26.8–34.3)
MCHC RBC AUTO-ENTMCNC: 31.5 G/DL (ref 31.4–37.4)
MCV RBC AUTO: 93 FL (ref 82–98)
METHADONE UR QL: NEGATIVE
MONOCYTES # BLD AUTO: 0.13 THOUSAND/UL (ref 0–1.22)
MONOCYTES NFR BLD: 3 % (ref 4–12)
NEUTROPHILS # BLD MANUAL: 1.3 THOUSAND/UL (ref 1.85–7.62)
NEUTS BAND NFR BLD MANUAL: 2 % (ref 0–8)
NEUTS SEG NFR BLD AUTO: 27 % (ref 43–75)
NITRITE UR QL STRIP: NEGATIVE
NRBC BLD AUTO-RTO: 0 /100 WBCS
OPIATES UR QL SCN: NEGATIVE
PCP UR QL: NEGATIVE
PH UR STRIP.AUTO: 6 [PH]
PHOSPHATE SERPL-MCNC: 4.4 MG/DL (ref 2.7–4.5)
PLATELET # BLD AUTO: 157 THOUSANDS/UL (ref 149–390)
PLATELET BLD QL SMEAR: ADEQUATE
PMV BLD AUTO: 9.9 FL (ref 8.9–12.7)
POTASSIUM SERPL-SCNC: 4 MMOL/L (ref 3.5–5.3)
PROT SERPL-MCNC: 7.6 G/DL (ref 6.4–8.2)
PROT UR STRIP-MCNC: NEGATIVE MG/DL
PROTHROMBIN TIME: 11.6 SECONDS (ref 9.8–12)
RBC # BLD AUTO: 4.42 MILLION/UL (ref 3.88–5.62)
RBC MORPH BLD: NORMAL
SODIUM SERPL-SCNC: 148 MMOL/L (ref 136–145)
SP GR UR STRIP.AUTO: <=1.005 (ref 1–1.03)
THC UR QL: NEGATIVE
TOTAL CELLS COUNTED SPEC: 100
TROPONIN I SERPL-MCNC: <0.02 NG/ML
UROBILINOGEN UR QL STRIP.AUTO: 0.2 E.U./DL
WBC # BLD AUTO: 4.47 THOUSAND/UL (ref 4.31–10.16)

## 2019-10-16 PROCEDURE — 81003 URINALYSIS AUTO W/O SCOPE: CPT | Performed by: EMERGENCY MEDICINE

## 2019-10-16 PROCEDURE — 85610 PROTHROMBIN TIME: CPT | Performed by: EMERGENCY MEDICINE

## 2019-10-16 PROCEDURE — 99285 EMERGENCY DEPT VISIT HI MDM: CPT

## 2019-10-16 PROCEDURE — 85730 THROMBOPLASTIN TIME PARTIAL: CPT | Performed by: EMERGENCY MEDICINE

## 2019-10-16 PROCEDURE — 85007 BL SMEAR W/DIFF WBC COUNT: CPT | Performed by: EMERGENCY MEDICINE

## 2019-10-16 PROCEDURE — 80307 DRUG TEST PRSMV CHEM ANLYZR: CPT | Performed by: EMERGENCY MEDICINE

## 2019-10-16 PROCEDURE — 36415 COLL VENOUS BLD VENIPUNCTURE: CPT | Performed by: EMERGENCY MEDICINE

## 2019-10-16 PROCEDURE — 83735 ASSAY OF MAGNESIUM: CPT | Performed by: EMERGENCY MEDICINE

## 2019-10-16 PROCEDURE — 80053 COMPREHEN METABOLIC PANEL: CPT | Performed by: EMERGENCY MEDICINE

## 2019-10-16 PROCEDURE — 71045 X-RAY EXAM CHEST 1 VIEW: CPT

## 2019-10-16 PROCEDURE — 83690 ASSAY OF LIPASE: CPT | Performed by: EMERGENCY MEDICINE

## 2019-10-16 PROCEDURE — 93005 ELECTROCARDIOGRAM TRACING: CPT

## 2019-10-16 PROCEDURE — 96368 THER/DIAG CONCURRENT INF: CPT

## 2019-10-16 PROCEDURE — 99220 PR INITIAL OBSERVATION CARE/DAY 70 MINUTES: CPT | Performed by: STUDENT IN AN ORGANIZED HEALTH CARE EDUCATION/TRAINING PROGRAM

## 2019-10-16 PROCEDURE — 84484 ASSAY OF TROPONIN QUANT: CPT | Performed by: EMERGENCY MEDICINE

## 2019-10-16 PROCEDURE — 96365 THER/PROPH/DIAG IV INF INIT: CPT

## 2019-10-16 PROCEDURE — 80320 DRUG SCREEN QUANTALCOHOLS: CPT | Performed by: EMERGENCY MEDICINE

## 2019-10-16 PROCEDURE — 85027 COMPLETE CBC AUTOMATED: CPT | Performed by: EMERGENCY MEDICINE

## 2019-10-16 PROCEDURE — 84100 ASSAY OF PHOSPHORUS: CPT | Performed by: EMERGENCY MEDICINE

## 2019-10-16 RX ORDER — MIRTAZAPINE 15 MG/1
7.5 TABLET, FILM COATED ORAL
Status: DISCONTINUED | OUTPATIENT
Start: 2019-10-16 | End: 2019-10-17 | Stop reason: HOSPADM

## 2019-10-16 RX ORDER — MAGNESIUM SULFATE HEPTAHYDRATE 40 MG/ML
2 INJECTION, SOLUTION INTRAVENOUS ONCE
Status: COMPLETED | OUTPATIENT
Start: 2019-10-16 | End: 2019-10-16

## 2019-10-16 RX ORDER — FOLIC ACID 1 MG/1
1 TABLET ORAL DAILY
Status: DISCONTINUED | OUTPATIENT
Start: 2019-10-17 | End: 2019-10-17 | Stop reason: HOSPADM

## 2019-10-16 RX ORDER — DULOXETIN HYDROCHLORIDE 60 MG/1
60 CAPSULE, DELAYED RELEASE ORAL DAILY
Status: DISCONTINUED | OUTPATIENT
Start: 2019-10-17 | End: 2019-10-17 | Stop reason: HOSPADM

## 2019-10-16 RX ORDER — ONDANSETRON 2 MG/ML
4 INJECTION INTRAMUSCULAR; INTRAVENOUS EVERY 6 HOURS PRN
Status: DISCONTINUED | OUTPATIENT
Start: 2019-10-16 | End: 2019-10-17 | Stop reason: HOSPADM

## 2019-10-16 RX ORDER — CLONAZEPAM 0.5 MG/1
0.5 TABLET ORAL 2 TIMES DAILY
Status: DISCONTINUED | OUTPATIENT
Start: 2019-10-16 | End: 2019-10-17 | Stop reason: HOSPADM

## 2019-10-16 RX ORDER — SODIUM CHLORIDE 9 MG/ML
125 INJECTION, SOLUTION INTRAVENOUS CONTINUOUS
Status: DISCONTINUED | OUTPATIENT
Start: 2019-10-16 | End: 2019-10-17 | Stop reason: HOSPADM

## 2019-10-16 RX ORDER — ACETAMINOPHEN 325 MG/1
650 TABLET ORAL EVERY 6 HOURS PRN
Status: DISCONTINUED | OUTPATIENT
Start: 2019-10-16 | End: 2019-10-17 | Stop reason: HOSPADM

## 2019-10-16 RX ORDER — THIAMINE MONONITRATE (VIT B1) 100 MG
100 TABLET ORAL DAILY
Status: DISCONTINUED | OUTPATIENT
Start: 2019-10-17 | End: 2019-10-17 | Stop reason: HOSPADM

## 2019-10-16 RX ADMIN — CLONAZEPAM 0.5 MG: 0.5 TABLET ORAL at 21:06

## 2019-10-16 RX ADMIN — SODIUM CHLORIDE 1000 ML: 0.9 INJECTION, SOLUTION INTRAVENOUS at 18:06

## 2019-10-16 RX ADMIN — MAGNESIUM SULFATE HEPTAHYDRATE 2 G: 40 INJECTION, SOLUTION INTRAVENOUS at 15:19

## 2019-10-16 RX ADMIN — SODIUM CHLORIDE 125 ML/HR: 0.9 INJECTION, SOLUTION INTRAVENOUS at 21:07

## 2019-10-16 RX ADMIN — THIAMINE HYDROCHLORIDE 100 MG: 100 INJECTION, SOLUTION INTRAMUSCULAR; INTRAVENOUS at 16:09

## 2019-10-16 RX ADMIN — FOLIC ACID 1 MG: 5 INJECTION, SOLUTION INTRAMUSCULAR; INTRAVENOUS; SUBCUTANEOUS at 15:30

## 2019-10-16 RX ADMIN — MIRTAZAPINE 7.5 MG: 15 TABLET, FILM COATED ORAL at 21:29

## 2019-10-16 RX ADMIN — FAMOTIDINE 20 MG: 10 INJECTION, SOLUTION INTRAVENOUS at 21:07

## 2019-10-16 RX ADMIN — SODIUM CHLORIDE 1000 ML: 0.9 INJECTION, SOLUTION INTRAVENOUS at 15:19

## 2019-10-16 NOTE — ED PROVIDER NOTES
History  Chief Complaint   Patient presents with    Alcohol Intoxication     Pt comes in due to ETOH intoxication  EMT  states that pt consumed large amount of ETOH and was not rousable by family  Pt is awake and states that he doesn't know why he is here  53-year-old male with past medical history of depression, chronic alcohol abuse, occipital neuralgia, seizure, insomnia, hypertension, anemia, vitamin-D deficiency, stomach cancer status post gastrectomy, pancreatitis, presents to the ED for evaluation of alcohol intoxication  Wife states that patient drinks intermittently at home  Wife came home today and found patient completely intoxicated and sleeping on the couch  Patient was minimally responsive to wife  Wife is concern and called EMS for further assistance  Patient was admitted for alcohol intoxication exactly 1 year ago  In the ED patient is intoxicated however alert and oriented to self and place  Patient states that he drank about of bottle vodka earlier today  History provided by:  Patient and spouse  Alcohol Intoxication       Prior to Admission Medications   Prescriptions Last Dose Informant Patient Reported? Taking? Ascorbic Acid (VITAMIN C) 1000 MG tablet   Yes No   Sig: Take 2,000 mg by mouth daily   Cyanocobalamin (VITAMIN B 12 PO)   Yes No   Sig: Take by mouth   DULoxetine (CYMBALTA) 60 mg delayed release capsule   No No   Sig: Take 1 capsule (60 mg total) by mouth daily Sprinkle contents of capsule onto apple sauce and take by mouth   FERROUS SULFATE PO  Self Yes No   Sig: Take 220 mg by mouth daily   SUMAtriptan (IMITREX) 100 mg tablet   No No   Sig: One tab at migraine onset, repeat after 2 hr if needed  No more than 2/24 hours or 3 per week     butalbital-aspirin-caffeine (FIORINAL) -40 mg per capsule  Spouse/Significant Other Yes No   Sig: Take 1 capsule by mouth 3 (three) times a day as needed for headaches   clonazePAM (KlonoPIN) 0 5 mg tablet   No No   Sig: Take 1 tablet (0 5 mg total) by mouth 2 (two) times a day   magnesium oxide (MAG-OX) 400 mg   No No   Sig: Take 1 tablet (400 mg total) by mouth daily   mirtazapine (REMERON) 15 mg tablet   No No   Sig: Take 0 5 tablets (7 5 mg total) by mouth daily at bedtime Can also take 1 tablet (15 mg) at bedtime if 7 5mg too sedating   ondansetron (ZOFRAN-ODT) 4 mg disintegrating tablet   No No   Si tab q4-6 hours prn nausea/ vomiting  zonisamide (ZONEGRAN) 50 MG capsule   No No   Sig: One capsule q a m  And 2 capsules q h s  x1 week, then 2 capsules b i d  Facility-Administered Medications: None       Past Medical History:   Diagnosis Date    Acute pancreatitis without infection or necrosis 3/31/2018    Anemia     Balance problems     [facility] stomach ([facility] )     Hypertension     Insomnia     Occipital neuralgia     Other mechanical complication of implanted electronic neurostimulator of spinal cord electrode (lead), sequela     maybe not working   [employer] of spinal cord ([facility] )     Vitamin D deficiency        Past Surgical History:   Procedure Laterality Date    ABDOMINAL SURGERY      complete gastrectomy    BACK SURGERY      cervical discectomy    GASTRECTOMY      INSERTION / PLACEMENT / REVISION NEUROSTIMULATOR      Neuro stimulator for iccipital neurolgia       Family History   Problem Relation Age of Onset    Hypertension Mother     Esophageal cancer Father     No Known Problems Brother      I have reviewed and agree with the history as documented  Social History     Tobacco Use    Smoking status: Former Smoker    Smokeless tobacco: Never Used    Tobacco comment: Venkat Lindo 30 yrs ago   Substance Use Topics    Alcohol use: Yes     Frequency: Monthly or less     Drinks per session: 1 or 2     Binge frequency: Never     Comment: currently not drinking since last hospital admission    Drug use: No        Review of Systems   Reason unable to perform ROS: Patient intoxicated         Physical Exam  Physical Exam   Constitutional:   Disheveled appearance  Breath smells heavily of alcohol  HENT:   Head: Normocephalic and atraumatic  Nose: Nose normal    Mouth/Throat: Oropharynx is clear and moist    Eyes: Conjunctivae and EOM are normal    Neck: Normal range of motion  Neck supple  Cardiovascular: Normal rate, regular rhythm and normal heart sounds  Pulmonary/Chest: Effort normal and breath sounds normal    Abdominal: Soft  Bowel sounds are normal  He exhibits no distension  There is no tenderness  Musculoskeletal: Normal range of motion  Neurological: He is alert  Patient is alert and oriented to self and place  Patient is spontaneously moving all extremities  Full neuro exam is limited as patient is intoxicated  Skin: Skin is warm  Psychiatric: He has a normal mood and affect  His behavior is normal  Judgment and thought content normal    Nursing note and vitals reviewed        Vital Signs  ED Triage Vitals [10/16/19 1446]   Temperature Pulse Respirations Blood Pressure SpO2   (!) 96 9 °F (36 1 °C) 90 18 134/94 98 %      Temp Source Heart Rate Source Patient Position - Orthostatic VS BP Location FiO2 (%)   Tympanic Monitor -- Right arm --      Pain Score       --           Vitals:    10/16/19 1446 10/16/19 1715   BP: 134/94 115/77   Pulse: 90 70         Visual Acuity  Visual Acuity      Most Recent Value   L Pupil Size (mm)  4   R Pupil Size (mm)  4          ED Medications  Medications   sodium chloride 0 9 % bolus 1,000 mL (1,000 mL Intravenous New Bag 10/16/19 1806)   sodium chloride 0 9 % bolus 1,000 mL (0 mL Intravenous Stopped 10/16/19 1630)   thiamine (VITAMIN B1) 100 mg in sodium chloride 0 9 % 50 mL IVPB (0 mg Intravenous Stopped 05/79/06 3427)   folic acid 1 mg in sodium chloride 0 9 % 50 mL IVPB (0 mg Intravenous Stopped 10/16/19 1608)   magnesium sulfate 2 g/50 mL IVPB (premix) 2 g (0 g Intravenous Stopped 10/16/19 1630)       Diagnostic Studies  Results Reviewed Procedure Component Value Units Date/Time    Rapid drug screen, urine [[id number]]  (Normal) Collected:  10/16/19 1806    Lab Status:  Final result Specimen:  Urine, Clean Catch Updated:  10/16/19 1826     Amph/Meth UR Negative     Barbiturate Ur Negative     Benzodiazepine Urine Negative     Cocaine Urine Negative     Methadone Urine Negative     Opiate Urine Negative     PCP Ur Negative     THC Urine Negative    Narrative:       FOR MEDICAL PURPOSES ONLY  IF CONFIRMATION NEEDED PLEASE CONTACT THE LAB WITHIN 5 DAYS      Drug Screen Cutoff Levels:  AMPHETAMINE/METHAMPHETAMINES  1000 ng/mL  BARBITURATES     200 ng/mL  BENZODIAZEPINES     200 ng/mL  COCAINE      300 ng/mL  METHADONE      300 ng/mL  OPIATES      300 ng/mL  PHENCYCLIDINE     25 ng/mL  THC       50 ng/mL      UA w Reflex to Microscopic w Reflex to Culture [[id number]] Collected:  10/16/19 1806    Lab Status:  Final result Specimen:  Urine, Clean Catch Updated:  10/16/19 1812     Color, UA Light Yellow     Clarity, UA Clear     Specific Gravity, UA <=1 005     pH, UA 6 0     Leukocytes, UA Negative     Nitrite, UA Negative     Protein, UA Negative mg/dl      Glucose, UA Negative mg/dl      Ketones, UA Negative mg/dl      Urobilinogen, UA 0 2 E U /dl      Bilirubin, UA Negative     Blood, UA Negative    Protime-INR [[id number]]  (Normal) Collected:  10/16/19 1510    Lab Status:  Final result Specimen:  Blood from Arm, Left Updated:  10/16/19 1544     Protime 11 6 seconds      INR 1 08    APTT [[id number]]  (Normal) Collected:  10/16/19 1510    Lab Status:  Final result Specimen:  Blood from Arm, Left Updated:  10/16/19 1544     PTT 27 seconds     Ethanol [[id number]]  (Abnormal) Collected:  10/16/19 1510    Lab Status:  Final result Specimen:  Blood from Arm, Left Updated:  10/16/19 1539     Ethanol Lvl 436 mg/dL     CBC and differential [[id number]]  (Abnormal) Collected:  10/16/19 1510    Lab Status:  Final result Specimen:  Blood from Arm, Left Updated: 10/16/19 1539     WBC 4 47 Thousand/uL      RBC 4 42 Million/uL      Hemoglobin 12 9 g/dL      Hematocrit 41 0 %      MCV 93 fL      MCH 29 2 pg      MCHC 31 5 g/dL      RDW 16 5 %      MPV 9 9 fL      Platelets 516 Thousands/uL      nRBC 0 /100 WBCs     Narrative: This is an appended report  These results have been appended to a previously verified report      Troponin I [[id number]]  (Normal) Collected:  10/16/19 1510    Lab Status:  Final result Specimen:  Blood from Arm, Left Updated:  10/16/19 1537     Troponin I <0 02 ng/mL     Comprehensive metabolic panel [[id number]]  (Abnormal) Collected:  10/16/19 1510    Lab Status:  Final result Specimen:  Blood from Arm, Left Updated:  10/16/19 1533     Sodium 148 mmol/L      Potassium 4 0 mmol/L      Chloride 107 mmol/L      CO2 26 mmol/L      ANION GAP 15 mmol/L      BUN 8 mg/dL      Creatinine 0 71 mg/dL      Glucose 107 mg/dL      Calcium 7 8 mg/dL      AST 71 U/L      ALT 50 U/L      Alkaline Phosphatase 143 U/L      Total Protein 7 6 g/dL      Albumin 3 8 g/dL      Total Bilirubin 2 30 mg/dL      eGFR 109 ml/min/1 73sq m     Narrative:       Jeremy guidelines for Chronic Kidney Disease (CKD):     Stage 1 with normal or high GFR (GFR > 90 mL/min/1 73 square meters)    Stage 2 Mild CKD (GFR = 60-89 mL/min/1 73 square meters)    Stage 3A Moderate CKD (GFR = 45-59 mL/min/1 73 square meters)    Stage 3B Moderate CKD (GFR = 30-44 mL/min/1 73 square meters)    Stage 4 Severe CKD (GFR = 15-29 mL/min/1 73 square meters)    Stage 5 End Stage CKD (GFR <15 mL/min/1 73 square meters)  Note: GFR calculation is accurate only with a steady state creatinine    Magnesium [[id number]]  (Normal) Collected:  10/16/19 1510    Lab Status:  Final result Specimen:  Blood from Arm, Left Updated:  10/16/19 1533     Magnesium 2 1 mg/dL     Lipase [[id number]]  (Normal) Collected:  10/16/19 1510    Lab Status:  Final result Specimen:  Blood from Arm, Left Updated:  10/16/19 1533     Lipase 223 u/L     Phosphorus [[id number]]  (Normal) Collected:  10/16/19 1510    Lab Status:  Final result Specimen:  Blood from Arm, Left Updated:  10/16/19 1533     Phosphorus 4 4 mg/dL                  XR chest 1 view portable   Final Result by Suzette Reilly MD (10/16 2353)      No acute cardiopulmonary disease  Workstation performed: [id number]                    Procedures  ECG 12 Lead Documentation Only  Date/Time: 10/16/2019 3:25 PM  Performed by: Reyes Quill, DO  Authorized by: Reyes Quill, DO     Indications / Diagnosis:  Alcohol intoxication  ECG reviewed by me, the ED Provider: yes    Patient location:  ED  Previous ECG:     Previous ECG:  Compared to current    Similarity:  No change    Comparison to cardiac monitor: Yes    Interpretation:     Interpretation: normal    Comments:      Sinus rhythm, rate 74, normal axis, normal intervals, no acute ST/T-wave abnormalities noted, otherwise unremarkable EKG, unchanged from previous study  ED Course  ED Course as of Oct 16 1827   Wed Oct 16, 2019   1540 Patient will be clinically sober at 7:00 a m  On 10/17/19                                  MDM  Number of Diagnoses or Management Options  Alcohol abuse: new and requires workup  Alcohol intoxication ([facility] [address] ): new and requires workup  Diagnosis management comments: Obtain blood work, blood alcohol level, UA, UDS, EKG  Give banana bag and reassess symptoms       Amount and/or Complexity of Data Reviewed  Clinical lab tests: ordered and reviewed  Tests in the radiology section of CPT®: ordered and reviewed  Tests in the medicine section of CPT®: ordered and reviewed  Review and summarize past medical records: yes  Independent visualization of images, tracings, or specimens: yes    Risk of Complications, Morbidity, and/or Mortality  General comments: Patient's blood alcohol level was 436 in the ED  All other blood work and UA was essentially unremarkable  Wife refused to take patient home until he is clinically sober  At this time patient is admitted for further management and monitoring for his alcohol intoxication  Patient Progress  Patient progress: stable      Disposition  Final diagnoses:   Alcohol intoxication ([facility] [address] )   Alcohol abuse     Time reflects when diagnosis was documented in both MDM as applicable and the Disposition within this note     Time User Action Codes Description Comment    10/16/2019  5:30 PM Noemi Prado Add [F10 929] Alcohol intoxication ([facility] [address] )     10/16/2019  5:30 PM Noemi Prado Add [F10 10] Alcohol abuse       ED Disposition     ED Disposition Condition Date/Time Comment    Admit Stable Wed Oct 16, 2019  5:29 PM Case was discussed with Dr Carlos Santo and the patient's admission status was agreed to be Admission Status: observation status to the service of Dr Carlos Santo  Follow-up Information    None         Patient's Medications   Discharge Prescriptions    No medications on file     No discharge procedures on file      ED Provider  Electronically Signed by           Sahil Ward DO  10/16/19 7339

## 2019-10-16 NOTE — ASSESSMENT & PLAN NOTE
Sees Dr Lyudmila Mo psychiatry as OP  No suicidal ideation  Continue home meds, Cymbalta, Remeron  Follow up as OP after DC

## 2019-10-16 NOTE — H&P
H&P- Alex García 1970, 47 y o  male MRN: [id number]    Unit/Bed#: ED 10 Encounter: [id number]    Primary Care Provider: Leeanna Su MD   Date and time admitted to hospital: 10/16/2019  2:44 PM        Alcoholic intoxication without complication [facility])  Assessment & Plan  Patient with long standing depression and alcohol abuse  EtOH on admission 436  · Admit overnight observation with tele  · Patient not interested in rehab or detox  · Will need monitoring for 13-17 hours overnight, once clinically sober can DC home in AM  · IVF hdyration  · MVI, thiamine and folate  · Antiemetic PRN  · Monitor electrolytes      Anxiety and depression  Josh Cabrera psychiatry as OP  No suicidal ideation  Continue home meds, Cymbalta, Remeron  Follow up as OP after DC    History of gastric cancer  Assessment & Plan  S/p Status post gastrojejunostomy and then complete gastrectomy due to recurrence of tumor at the margins   continue OP follow up    Elevated LFTs  Assessment & Plan  Mild transaminatis, due to alcohol use  Abdomen benign  Monitor  IVF hydration    Essential hypertension  Assessment & Plan  Hx of HTN, not on any medications  Monitor BP        VTE Prophylaxis: Enoxaparin (Lovenox)  / sequential compression device   Code Status: FULL CODE    Anticipated Length of Stay:  Patient will be admitted on an Observation basis with an anticipated length of stay of  > 2 midnights  Justification for Hospital Stay: intoxication    Total Time for Visit, including Counseling / Coordination of Care: 45 minutes  Greater than 50% of this total time spent on direct patient counseling and coordination of care  Chief Complaint:   Alcohol Intoxication (Pt comes in due to ETOH intoxication  EMT  states that pt consumed large amount of ETOH and was not rousable by family    Pt is awake and states that he doesn't know why he is here )      History of Present Illness:    Alex García is a 47 y o  male with a PMH of depression, HENRY, alcohol abuse who presents via EMS after being found unresponsive by family  Patient states he has no idea how or why hes here, but assumes its because of his alcohol problem  His wife states that he has a longstanding hx of depression that started when he was diagnosed with stomach cancer and had to have a gastrectomy  Afterwards he has not been able to work and since then he drinks daily and heavily  He recently went to a trip to [place] and apparently while there his mother was able to keep him sober the entire time he was there which he was very happy with as well, but upon coming back to the [facility] he has started to drink again  Patient denies any complaints and states that he can take care of himself and wants to go home  His wife has declined to sign him out AMA and wants him to sober up in the hospital for his best interest  He states to me that he does a lot better in [place] and hes happier there and doesn't feel the urge to drink  In the ED workup was pertinent for EtOH level of 438 so he is admitted for monitoring overnight       Review of Systems:    Review of Systems    Past Medical and Surgical History:     Past Medical History:   Diagnosis Date    Acute pancreatitis without infection or necrosis 3/31/2018    Anemia     Balance problems     Cancer of stomach ([facility] [address] )     Hypertension     Insomnia     Occipital neuralgia     Other mechanical complication of implanted electronic neurostimulator of spinal cord electrode (lead), sequela     maybe not working   [employer] of spinal cord ([address] )     Vitamin D deficiency        Past Surgical History:   Procedure Laterality Date    ABDOMINAL SURGERY      complete gastrectomy    BACK SURGERY      cervical discectomy    GASTRECTOMY      INSERTION / PLACEMENT / REVISION NEUROSTIMULATOR      Neuro stimulator for iccipital neurolgia       Meds/Allergies:    Prior to Admission medications    Medication Sig Start Date End Date Taking? Authorizing Provider   Ascorbic Acid (VITAMIN C) 1000 MG tablet Take 2,000 mg by mouth daily    Historical Provider, MD   butalbital-aspirin-caffeine [facility]) -40 mg per capsule Take 1 capsule by mouth 3 (three) times a day as needed for headaches    Historical Provider, MD   clonazePAM (KlonoPIN) 0 5 mg tablet Take 1 tablet (0 5 mg total) by mouth 2 (two) times a day 8/7/19   Meena Collazo MD   Cyanocobalamin (VITAMIN B 12 PO) Take by mouth    Historical Provider, MD   DULoxetine (CYMBALTA) 60 mg delayed release capsule Take 1 capsule (60 mg total) by mouth daily Sprinkle contents of capsule onto apple sauce and take by mouth 8/7/19   Meena Collazo MD   FERROUS SULFATE PO Take 220 mg by mouth daily    Historical Provider, MD   magnesium oxide (MAG-OX) 400 mg Take 1 tablet (400 mg total) by mouth daily 4/7/18   Elaine You MD   mirtazapine (REMERON) 15 mg tablet Take 0 5 tablets (7 5 mg total) by mouth daily at bedtime Can also take 1 tablet (15 mg) at bedtime if 7 5mg too sedating 8/7/19   Meena Collaoz MD   ondansetron (ZOFRAN-ODT) 4 mg disintegrating tablet 1 tab q4-6 hours prn nausea/ vomiting  3/8/19   Wiley Anthony PA-C   SUMAtriptan (IMITREX) 100 mg tablet One tab at migraine onset, repeat after 2 hr if needed  No more than 2/24 hours or 3 per week  4/12/19   Wiley Anthony PA-C   zonisamide (ZONEGRAN) 50 MG capsule One capsule q a m  And 2 capsules q h s  x1 week, then 2 capsules b i d  7/19/19   Wiley Anthony PA-C   carbamazepine (CARBATROL) 100 MG 12 hr capsule Take 1 capsule in morning and 2 capsules in evening  Patient not taking: Reported on 8/30/2018 5/31/18 8/30/18  Jason Licona MD       Allergies:    Allergies   Allergen Reactions    Carbamazepine Rash, Eye Swelling and Facial Swelling       Social History:     Marital Status: /Civil Union   Substance Use History:   Social History     Substance and Sexual Activity   Alcohol Use Yes    Frequency: Monthly or less    Drinks per session: 1 or 2    Binge frequency: Never    Comment: currently not drinking since last hospital admission     Social History     Tobacco Use   Smoking Status Former Smoker   Smokeless Tobacco Never Used   Tobacco Comment    Quuit 30 yrs ago     Social History     Substance and Sexual Activity   Drug Use No       Family History:    non-contributory    Physical Exam:     Vitals:   Blood Pressure: 115/77 (10/16/19 1715)  Pulse: 70 (10/16/19 1715)  Temperature: (!) 96 9 °F (36 1 °C) (10/16/19 1446)  Temp Source: Tympanic (10/16/19 1446)  Respirations: 14 (10/16/19 1715)  SpO2: 94 % (10/16/19 1715)    Physical Exam   Constitutional: He is oriented to person, place, and time  He appears well-developed  No distress  Visibly intoxication   HENT:   Head: Normocephalic and atraumatic  Face is flushed and diaphoretic, dry MMs   Eyes: Pupils are equal, round, and reactive to light  No scleral icterus  Conjunctival injection   Cardiovascular: Normal rate, regular rhythm and normal heart sounds  No murmur heard  Pulmonary/Chest: Effort normal and breath sounds normal  No respiratory distress  He has no wheezes  He has no rales  Abdominal: Soft  Bowel sounds are normal  He exhibits no distension  There is no tenderness  There is no rebound  Musculoskeletal: He exhibits no edema, tenderness or deformity  Neurological: He is alert and oriented to person, place, and time  Coordination abnormal    Skin: Skin is warm  Psychiatric:   impaired   Nursing note and vitals reviewed  Additional Data:     Lab Results: I have personally reviewed pertinent reports        Results from last 7 days   Lab Units 10/16/19  1510   WBC Thousand/uL 4 47   HEMOGLOBIN g/dL 12 9   HEMATOCRIT % 41 0   PLATELETS Thousands/uL 157     Results from last 7 days   Lab Units 10/16/19  1510   SODIUM mmol/L 148*   POTASSIUM mmol/L 4 0   CHLORIDE mmol/L 107   CO2 mmol/L 26   BUN mg/dL 8   CREATININE mg/dL 0 71   CALCIUM mg/dL 7 8*   TOTAL BILIRUBIN mg/dL 2 30*   ALK PHOS U/L 143*   ALT U/L 50   AST U/L 71*     Results from last 7 days   Lab Units 10/16/19  1510   INR  1 08     Results from last 7 days   Lab Units 10/16/19  1510   TROPONIN I ng/mL <0 02               Imaging: I have personally reviewed pertinent reports  XR chest 1 view portable   Final Result by Adilia Horvath MD (10/16 1643)      No acute cardiopulmonary disease  Workstation performed: [id number]             XR chest 1 view portable   Final Result      No acute cardiopulmonary disease  Workstation performed: [id number]             EKG, Pathology, and Other Studies Reviewed on Admission:   · EKG: NSR    Allscripts / Epic Records Reviewed: Yes     ** Please Note: This note has been constructed using a voice recognition system   **

## 2019-10-16 NOTE — ASSESSMENT & PLAN NOTE
S/p Status post gastrojejunostomy and then complete gastrectomy due to recurrence of tumor at the margins   continue OP follow up

## 2019-10-16 NOTE — ASSESSMENT & PLAN NOTE
Patient with long standing depression and alcohol abuse  EtOH on admission 436  · Admit overnight observation with tele  · Patient not interested in rehab or detox  · Will need monitoring for 13-17 hours overnight, once clinically sober can DC home in AM  · IVF hdyration  · MVI, thiamine and folate  · Antiemetic PRN  · Monitor electrolytes

## 2019-10-17 VITALS
DIASTOLIC BLOOD PRESSURE: 94 MMHG | RESPIRATION RATE: 18 BRPM | OXYGEN SATURATION: 99 % | BODY MASS INDEX: 24.11 KG/M2 | SYSTOLIC BLOOD PRESSURE: 159 MMHG | WEIGHT: 178 LBS | HEIGHT: 72 IN | HEART RATE: 80 BPM | TEMPERATURE: 98.5 F

## 2019-10-17 LAB
ALBUMIN SERPL BCP-MCNC: 2.9 G/DL (ref 3.5–5)
ALP SERPL-CCNC: 107 U/L (ref 46–116)
ALT SERPL W P-5'-P-CCNC: 46 U/L (ref 12–78)
ANION GAP SERPL CALCULATED.3IONS-SCNC: 13 MMOL/L (ref 4–13)
AST SERPL W P-5'-P-CCNC: 71 U/L (ref 5–45)
BILIRUB SERPL-MCNC: 2.1 MG/DL (ref 0.2–1)
BUN SERPL-MCNC: 9 MG/DL (ref 5–25)
CALCIUM SERPL-MCNC: 7.4 MG/DL (ref 8.3–10.1)
CHLORIDE SERPL-SCNC: 110 MMOL/L (ref 100–108)
CO2 SERPL-SCNC: 24 MMOL/L (ref 21–32)
CREAT SERPL-MCNC: 0.63 MG/DL (ref 0.6–1.3)
GFR SERPL CREATININE-BSD FRML MDRD: 115 ML/MIN/1.73SQ M
GLUCOSE SERPL-MCNC: 79 MG/DL (ref 65–140)
MAGNESIUM SERPL-MCNC: 2.2 MG/DL (ref 1.6–2.6)
POTASSIUM SERPL-SCNC: 3.6 MMOL/L (ref 3.5–5.3)
PROT SERPL-MCNC: 6 G/DL (ref 6.4–8.2)
SODIUM SERPL-SCNC: 147 MMOL/L (ref 136–145)

## 2019-10-17 PROCEDURE — 83735 ASSAY OF MAGNESIUM: CPT | Performed by: STUDENT IN AN ORGANIZED HEALTH CARE EDUCATION/TRAINING PROGRAM

## 2019-10-17 PROCEDURE — 99217 PR OBSERVATION CARE DISCHARGE MANAGEMENT: CPT | Performed by: STUDENT IN AN ORGANIZED HEALTH CARE EDUCATION/TRAINING PROGRAM

## 2019-10-17 PROCEDURE — 80053 COMPREHEN METABOLIC PANEL: CPT | Performed by: STUDENT IN AN ORGANIZED HEALTH CARE EDUCATION/TRAINING PROGRAM

## 2019-10-17 RX ADMIN — Medication 1 TABLET: at 08:39

## 2019-10-17 RX ADMIN — FOLIC ACID 1 MG: 1 TABLET ORAL at 08:39

## 2019-10-17 RX ADMIN — DULOXETINE HYDROCHLORIDE 60 MG: 60 CAPSULE, DELAYED RELEASE ORAL at 08:39

## 2019-10-17 RX ADMIN — SODIUM CHLORIDE 125 ML/HR: 0.9 INJECTION, SOLUTION INTRAVENOUS at 04:13

## 2019-10-17 RX ADMIN — MAGNESIUM OXIDE TAB 400 MG (240 MG ELEMENTAL MG) 400 MG: 400 (240 MG) TAB at 08:39

## 2019-10-17 RX ADMIN — Medication 100 MG: at 08:39

## 2019-10-17 RX ADMIN — CLONAZEPAM 0.5 MG: 0.5 TABLET ORAL at 08:39

## 2019-10-17 RX ADMIN — FAMOTIDINE 20 MG: 10 INJECTION, SOLUTION INTRAVENOUS at 08:39

## 2019-10-17 NOTE — UTILIZATION REVIEW
Initial Clinical Review    Admission: Date/Time/Statement:    Admission Orders (From admission, onward)     Ordered        10/16/19 1730  Place in Observation  Once                   Orders Placed This Encounter   Procedures    Place in Observation     Standing Status:   Standing     Number of Occurrences:   1     Order Specific Question:   Admitting Physician     Answer:   Juan Ko     Order Specific Question:   Level of Care     Answer:   Med Surg [16]     ED Arrival Information     Expected Arrival Acuity Means of Arrival Escorted By Service Admission Type    - 10/16/2019 14:42 Urgent Ambulance Other (Comment) Hospitalist Urgent    Arrival Complaint    alcohol intoxication        Chief Complaint   Patient presents with    Alcohol Intoxication     Pt comes in due to ETOH intoxication  EMT  states that pt consumed large amount of ETOH and was not rousable by family  Pt is awake and states that he doesn't know why he is here  Assessment/Plan:   50-year-old male with past medical history of depression, chronic alcohol abuse, occipital neuralgia, seizure, insomnia, hypertension, anemia, vitamin-D deficiency, stomach cancer status post gastrectomy, pancreatitis, presents to the ED for evaluation of alcohol intoxication  Wife states that patient drinks intermittently at home  Wife came home today and found patient completely intoxicated and sleeping on the couch  Patient was minimally responsive to wife  Wife is concern and called EMS for further assistance  Patient was admitted for alcohol intoxication exactly 1 year ago  In the ED patient is intoxicated however alert and oriented to self and place  Patient states that he drank about of bottle vodka earlier today    Alcoholic intoxication without complication [facility])  Assessment & Plan  Patient with long standing depression and alcohol abuse  EtOH on admission 436  · Admit overnight observation with tele  · Patient not interested in rehab or detox  · Will need monitoring for 13-17 hours overnight, once clinically sober can DC home in AM  · IVF hdyration  · MVI, thiamine and folate  · Antiemetic PRN  · Monitor electrolytes    ED Triage Vitals   Temperature Pulse Respirations Blood Pressure SpO2   10/16/19 1446 10/16/19 1446 10/16/19 1446 10/16/19 1446 10/16/19 1446   (!) 96 9 °F (36 1 °C) 90 18 134/94 98 %      Temp Source Heart Rate Source Patient Position - Orthostatic VS BP Location FiO2 (%)   10/16/19 1446 10/16/19 1446 10/16/19 2010 10/16/19 1446 --   Tympanic Monitor Lying Right arm       Pain Score       10/16/19 2009       No Pain        Wt Readings from Last 1 Encounters:   10/16/19 80 7 kg (178 lb)     Additional Vital Signs:   Date/Time  Temp  Pulse  Resp  BP  SpO2  O2 Device  Patient Position - Orthostatic VS   10/17/19 0410  97 9 °F (36 6 °C)  63  16  125/76  95 %    Lying   10/17/19 0002  99 °F (37 2 °C)  68  17  121/77  96 %  None (Room air)  Lying   10/16/19 2049            None (Room air)     Pertinent Labs/Diagnostic Test Results:   Results from last 7 days   Lab Units 10/16/19  1510   WBC Thousand/uL 4 47   HEMOGLOBIN g/dL 12 9   HEMATOCRIT % 41 0   PLATELETS Thousands/uL 157   BANDS PCT % 2     Results from last 7 days   Lab Units 10/17/19  0525 10/16/19  1510   SODIUM mmol/L 147* 148*   POTASSIUM mmol/L 3 6 4 0   CHLORIDE mmol/L 110* 107   CO2 mmol/L 24 26   ANION GAP mmol/L 13 15*   BUN mg/dL 9 8   CREATININE mg/dL 0 63 0 71   EGFR ml/min/1 73sq m 115 109   CALCIUM mg/dL 7 4* 7 8*   MAGNESIUM mg/dL 2 2 2 1   PHOSPHORUS mg/dL  --  4 4     Results from last 7 days   Lab Units 10/17/19  0525 10/16/19  1510   AST U/L 71* 71*   ALT U/L 46 50   ALK PHOS U/L 107 143*   TOTAL PROTEIN g/dL 6 0* 7 6   ALBUMIN g/dL 2 9* 3 8   TOTAL BILIRUBIN mg/dL 2 10* 2 30*     Results from last 7 days   Lab Units 10/17/19  0525 10/16/19  1510   GLUCOSE RANDOM mg/dL 79 107     Results from last 7 days   Lab Units 10/16/19  1510   TROPONIN I ng/mL <0 02     Results from last 7 days   Lab Units 10/16/19  1510   PROTIME seconds 11 6   INR  1 08   PTT seconds 27     Results from last 7 days   Lab Units 10/16/19  1510   LIPASE u/L 223     Results from last 7 days   Lab Units 10/16/19  1806   CLARITY UA  Clear   COLOR UA  Light Yellow   SPEC GRAV UA  <=1 005   PH UA  6 0   GLUCOSE UA mg/dl Negative   KETONES UA mg/dl Negative   BLOOD UA  Negative   PROTEIN UA mg/dl Negative   NITRITE UA  Negative   BILIRUBIN UA  Negative   UROBILINOGEN UA E U /dl 0 2   LEUKOCYTES UA  Negative     Results from last 7 days   Lab Units 10/16/19  1806   AMPH/METH  Negative   BARBITURATE UR  Negative   BENZODIAZEPINE UR  Negative   COCAINE UR  Negative   METHADONE URINE  Negative   OPIATE UR  Negative   PCP UR  Negative   THC UR  Negative     Results from last 7 days   Lab Units 10/16/19  1510   ETHANOL LVL mg/dL 436*     Results from last 7 days   Lab Units 10/16/19  1510   TOTAL COUNTED  100     CXR=No acute cardiopulmonary disease  EKG=Sinus rhythm, rate 74, normal axis, normal intervals, no acute ST/T-wave abnormalities noted, otherwise unremarkable EKG, unchanged from previous study    ED Treatment:   Medication Administration from 10/16/2019 1442 to 10/16/2019 1923       Date/Time Order Dose Route     10/16/2019 1519 sodium chloride 0 9 % bolus 1,000 mL 1,000 mL Intravenous     10/16/2019 1609 thiamine (VITAMIN B1) 100 mg in sodium chloride 0 9 % 50 mL IVPB 100 mg Intravenous     39/50/8793 8815 folic acid 1 mg in sodium chloride 0 9 % 50 mL IVPB 1 mg Intravenous     10/16/2019 1519 magnesium sulfate 2 g/50 mL IVPB (premix) 2 g 2 g Intravenous     10/16/2019 1806 sodium chloride 0 9 % bolus 1,000 mL 1,000 mL Intravenous        Past Medical History:   Diagnosis Date    Acute pancreatitis without infection or necrosis 3/31/2018    Anemia     Balance problems     Cancer of stomach ([address] )     Hypertension     Insomnia     Occipital neuralgia     Other mechanical complication of implanted electronic neurostimulator of spinal cord electrode (lead), sequela     maybe not working   [employer] of spinal cord [facility]     Vitamin D deficiency      Present on Admission:   Alcoholic intoxication without complication ([facility] )   Essential hypertension   History of gastric cancer   Elevated LFTs   Anxiety and depression  Admitting Diagnosis: Alcohol intoxication ([facility] ) [F10 929]  Alcohol abuse [F10 10]  Age/Sex: 47 y o  male  Admission Orders:  TELEMETRY  FALL PRECAUTIONS  SEIZURE PRECAUTIONS  ASPIRATION PRECAUTIONS  VENODYNES  Medications:  clonazePAM 0 5 mg Oral BID   DULoxetine 60 mg Oral Daily   enoxaparin 40 mg Subcutaneous Daily   famotidine 20 mg Intravenous [address]C [address]   folic acid 1 mg Oral Daily   magnesium oxide 400 mg Oral Daily   mirtazapine 7 5 mg Oral HS   multivitamin-minerals 1 tablet Oral Daily   thiamine 100 mg Oral Daily     sodium chloride 125 mL/hr Intravenous Continuous     acetaminophen 650 mg Oral Q6H PRN   ondansetron 4 mg Intravenous Q6H PRN     Network Utilization Review Department  Phone: [phone]; Fax [phone]  [email or web address]  ATTENTION: Please call with any questions or concerns to [phone]  and carefully listen to the prompts so that you are directed to the right person  Send all requests for admission clinical reviews, approved or denied determinations and any other requests to fax [phone]   All voicemails are confidential

## 2019-10-17 NOTE — ASSESSMENT & PLAN NOTE
Sees Dr Brittanie Lawrence psychiatry as OP  No suicidal ideation  Continue home meds, Cymbalta, Remeron  Follow up as OP after DC

## 2019-10-17 NOTE — NURSING NOTE
Pt d/c to home  IV access removed prior to d/c  D/c instructions and medications reviewed with the pt, pt verbalized understanding  All questions answered  Pt left the unit ambulatory and unassisted, accompanied by his wife  Pt left the unit with all belongings in his possession

## 2019-10-17 NOTE — ASSESSMENT & PLAN NOTE
Mild transaminatis, due to alcohol use, improved with hydration  Abdomen benign  Patient advised to abstain from EtOH

## 2019-10-17 NOTE — ED NOTES
10/17/19 @ 1016:  PES informed by  that patient requested information about D&A treatment  PES put together contact information and presented to patient, who was already in touch with [facility]   Patient and wife report that they are to contact Alicia Macedo upon discharge from hospital, and will get set up for appropriate treatment    Julio César Montes MS

## 2019-10-17 NOTE — ASSESSMENT & PLAN NOTE
Patient with long standing depression and alcohol abuse  EtOH on admission 436  · Admited overnight observation with tele- no events overnight  · Treated with supportive care, IVF hydration, MVI, thiamine and folate, Antiemetic PRN  · The next morning he was lucid and sober     · Discussion held with him and his wife, patient interested In inpatient rehab  · Crisis team was called up and gave patient rehab resources and he was discharged home

## 2019-10-17 NOTE — PLAN OF CARE
Problem: Potential for Falls  Goal: Patient will remain free of falls  Description  INTERVENTIONS:  - Assess patient frequently for physical needs  -  Identify cognitive and physical deficits and behaviors that affect risk of falls    -  [place] fall precautions as indicated by assessment   - Educate patient/family on patient safety including physical limitations  - Instruct patient to call for assistance with activity based on assessment  - Modify environment to reduce risk of injury  - Consider OT/PT consult to assist with strengthening/mobility  Outcome: Progressing     Problem: DISCHARGE PLANNING  Goal: Discharge to home or other facility with appropriate resources  Description  INTERVENTIONS:  - Identify barriers to discharge w/patient and caregiver  - Arrange for needed discharge resources and transportation as appropriate  - Identify discharge learning needs (meds, wound care, etc )  - Arrange for interpretive services to assist at discharge as needed  - Refer to Case Management Department for coordinating discharge planning if the patient needs post-hospital services based on physician/advanced practitioner order or complex needs related to functional status, cognitive ability, or social support system  Outcome: Progressing     Problem: METABOLIC, FLUID AND ELECTROLYTES - ADULT  Goal: Electrolytes maintained within normal limits  Description  INTERVENTIONS:  - Monitor labs and assess patient for signs and symptoms of electrolyte imbalances  - Administer electrolyte replacement as ordered  - Monitor response to electrolyte replacements, including repeat lab results as appropriate  - Instruct patient on fluid and nutrition as appropriate  Outcome: Progressing  Goal: Fluid balance maintained  Description  INTERVENTIONS:  - Monitor labs   - Monitor I/O and WT  - Instruct patient on fluid and nutrition as appropriate  - Assess for signs & symptoms of volume excess or deficit  Outcome: Progressing

## 2019-10-17 NOTE — PLAN OF CARE
Problem: Potential for Falls  Goal: Patient will remain free of falls  Description  INTERVENTIONS:  - Assess patient frequently for physical needs  -  Identify cognitive and physical deficits and behaviors that affect risk of falls    -  [place] fall precautions as indicated by assessment   - Educate patient/family on patient safety including physical limitations  - Instruct patient to call for assistance with activity based on assessment  - Modify environment to reduce risk of injury  - Consider OT/PT consult to assist with strengthening/mobility  10/17/2019 1037 by Salome Fullre RN  Outcome: Adequate for Discharge  10/17/2019 0926 by Salome Fuller RN  Outcome: Progressing     Problem: DISCHARGE PLANNING  Goal: Discharge to home or other facility with appropriate resources  Description  INTERVENTIONS:  - Identify barriers to discharge w/patient and caregiver  - Arrange for needed discharge resources and transportation as appropriate  - Identify discharge learning needs (meds, wound care, etc )  - Arrange for interpretive services to assist at discharge as needed  - Refer to Case Management Department for coordinating discharge planning if the patient needs post-hospital services based on physician/advanced practitioner order or complex needs related to functional status, cognitive ability, or social support system  10/17/2019 1037 by Salome Fuller RN  Outcome: Adequate for Discharge  10/17/2019 0926 by Salome Fuller RN  Outcome: Progressing     Problem: METABOLIC, FLUID AND ELECTROLYTES - ADULT  Goal: Electrolytes maintained within normal limits  Description  INTERVENTIONS:  - Monitor labs and assess patient for signs and symptoms of electrolyte imbalances  - Administer electrolyte replacement as ordered  - Monitor response to electrolyte replacements, including repeat lab results as appropriate  - Instruct patient on fluid and nutrition as appropriate  10/17/2019 1037 by Abdiaziz Gonzales RN  Outcome: Adequate for Discharge  10/17/2019 7264 by Abdiaziz Gonzales RN  Outcome: Progressing  Goal: Fluid balance maintained  Description  INTERVENTIONS:  - Monitor labs   - Monitor I/O and WT  - Instruct patient on fluid and nutrition as appropriate  - Assess for signs & symptoms of volume excess or deficit  10/17/2019 1037 by Abdiaziz Gonzales RN  Outcome: Adequate for Discharge  10/17/2019 0926 by Abdiaziz Gonzales RN  Outcome: Progressing

## 2019-10-17 NOTE — DISCHARGE SUMMARY
Discharge- Mike Rogers 1970, 47 y o  male MRN: [id number]    Unit/Bed#: [address] [place] [address] Encounter: [id number]    Primary Care Provider: Trip Triplett MD   Date and time admitted to hospital: 10/16/2019  2:44 PM        * Alcoholic intoxication without complication [facility])  Assessment & Plan  Patient with long standing depression and alcohol abuse  EtOH on admission 436  · Admited overnight observation with tele- no events overnight  · Treated with supportive care, IVF hydration, MVI, thiamine and folate, Antiemetic PRN  · The next morning he was lucid and sober  · Discussion held with him and his wife, patient interested In inpatient rehab  · Crisis team was called up and gave patient rehab resources and he was discharged home       Anxiety and depression  [facility] psychiatry as OP  No suicidal ideation  Continue home meds, Cymbalta, Remeron  Follow up as OP after DC    History of gastric cancer  Assessment & Plan  S/p Status post gastrojejunostomy and then complete gastrectomy due to recurrence of tumor at the margins   continue OP follow up    Elevated LFTs  Assessment & Plan  Mild transaminatis, due to alcohol use, improved with hydration  Abdomen benign  Patient advised to abstain from EtOH    Essential hypertension  Assessment & Plan  Hx of HTN, not on any medications  Monitor BP- trend improved as patient sobered up          Discharging Physician / Practitioner: [facility], MD  PCP: Trip Triplett MD  Admission Date: 10/16/2019  Discharge Date: 10/17/19    Reason for Admission: Alcohol Intoxication (Pt comes in due to ETOH intoxication  EMT  states that pt consumed large amount of ETOH and was not rousable by family    Pt is awake and states that he doesn't know why he is here )        Resolved Problems  Date Reviewed: 7/23/2019    None          Consultations During Hospital Stay:  None    Procedures Performed:     ·     Significant Findings / Test Results: ·   Results from last 7 days   Lab Units 10/16/19  1510   WBC Thousand/uL 4 47   HEMOGLOBIN g/dL 12 9   PLATELETS Thousands/uL 157     Results from last 7 days   Lab Units 10/17/19  0525 10/16/19  1510   SODIUM mmol/L 147* 148*   POTASSIUM mmol/L 3 6 4 0   CHLORIDE mmol/L 110* 107   CO2 mmol/L 24 26   BUN mg/dL 9 8   CREATININE mg/dL 0 63 0 71   CALCIUM mg/dL 7 4* 7 8*   TOTAL BILIRUBIN mg/dL 2 10* 2 30*   ALK PHOS U/L 107 143*   ALT U/L 46 50   AST U/L 71* 71*     Results from last 7 days   Lab Units 10/16/19  1510   INR  1 08     Results from last 7 days   Lab Units 10/16/19  1510   TROPONIN I ng/mL <0 02     No results found for: HGBA1C          Blood Culture: No results found for: BLOODCX  Urine Culture: No results found for: URINECX  Sputum Culture: No components found for: SPUTUMCX  Wound Culture: No results found for: WOUNDCULT     XR chest 1 view portable   Final Result by Kenneth Avery MD (10/16 1643)      No acute cardiopulmonary disease  Workstation performed: [id number]                Incidental Findings:   ·      Test Results Pending at Discharge (will require follow up):   ·      Outpatient Tests Requested:  ·     Complications:  none    Reason for Admission:   Chief Complaint   Patient presents with    Alcohol Intoxication     Pt comes in due to ETOH intoxication  EMT  states that pt consumed large amount of ETOH and was not rousable by family  Pt is awake and states that he doesn't know why he is here  Hospital Course:     Per HPI: Eden Cruz is a 47 y o  male patient with a PMH of depression, HENRY, alcohol abuse who presents via EMS after being found unresponsive by family  Patient states he has no idea how or why hes here, but assumes its because of his alcohol problem  His wife states that he has a longstanding hx of depression that started when he was diagnosed with stomach cancer and had to have a gastrectomy   Afterwards he has not been able to work and since then he drinks daily and heavily  He recently went to a trip to [place] and apparently while there his mother was able to keep him sober the entire time he was there which he was very happy with as well, but upon coming back to the [address] he has started to drink again  Patient denies any complaints and states that he can take care of himself and wants to go home  His wife has declined to sign him out AMA and wants him to sober up in the hospital for his best interest  He states to me that he does a lot better in [place] and hes happier there and doesn't feel the urge to drink       In the ED workup was pertinent for EtOH level of 438 so he is admitted for monitoring overnight  Hospital Course: Please see above list of diagnoses and related plan for additional information  Condition at Discharge: stable       Discharge Day Visit / Exam:     Subjective:  Feels good, now that hes sober wants to get help, and asking for inpatient rehab resources  Worried that if he does to inpatient rehab his workmans comp will drop his case  Vitals: Blood Pressure: 159/94 (10/17/19 0810)  Pulse: 80 (10/17/19 0810)  Temperature: 98 5 °F (36 9 °C) (10/17/19 0810)  Temp Source: Oral (10/17/19 0810)  Respirations: 18 (10/17/19 0810)  Height: 6' (182 9 cm) (10/16/19 2009)  Weight - Scale: 80 7 kg (178 lb) (10/16/19 2009)  SpO2: 99 % (10/17/19 0810)  Exam:   Physical Exam   Constitutional: He is oriented to person, place, and time  He appears well-developed  No distress  HENT:   Head: Normocephalic and atraumatic  Cardiovascular: Normal rate, regular rhythm and normal heart sounds  No murmur heard  Pulmonary/Chest: Effort normal and breath sounds normal  No respiratory distress  He has no wheezes  He has no rales  Abdominal: Soft  Bowel sounds are normal  He exhibits no distension  There is no tenderness  There is no rebound  Musculoskeletal: He exhibits no edema, tenderness or deformity     Neurological: He is alert and oriented to person, place, and time  He displays normal reflexes  No cranial nerve deficit or sensory deficit  He exhibits normal muscle tone  Coordination normal    Skin: Skin is warm and dry  Psychiatric: He has a normal mood and affect  His behavior is normal    Nursing note and vitals reviewed  Discharge instructions/Information to patient and family:   See after visit summary for information provided to patient and family  Provisions for Follow-Up Care:  See after visit summary for information related to follow-up care and any pertinent home health orders  Disposition:     Home    Planned Readmission: no     Discharge Statement:  I spent >30 minutes discharging the patient  This time was spent on the day of discharge  I had direct contact with the patient on the day of discharge  Greater than 50% of the total time was spent examining patient, answering all patient questions, arranging and discussing plan of care with patient as well as directly providing post-discharge instructions  Additional time then spent on discharge activities  Discharge Medications:  See after visit summary for reconciled discharge medications provided to patient and family        ** Please Note: This note has been constructed using a voice recognition system **

## 2019-10-17 NOTE — PLAN OF CARE
Problem: Potential for Falls  Goal: Patient will remain free of falls  Description  INTERVENTIONS:  - Assess patient frequently for physical needs  -  Identify cognitive and physical deficits and behaviors that affect risk of falls    -  [place] fall precautions as indicated by assessment   - Educate patient/family on patient safety including physical limitations  - Instruct patient to call for assistance with activity based on assessment  - Modify environment to reduce risk of injury  Outcome: Progressing     Problem: DISCHARGE PLANNING  Goal: Discharge to home or other facility with appropriate resources  Description  INTERVENTIONS:  - Identify barriers to discharge w/patient and caregiver  - Arrange for needed discharge resources and transportation as appropriate  - Identify discharge learning needs (meds, wound care, etc )  - Arrange for interpretive services to assist at discharge as needed  - Refer to Case Management Department for coordinating discharge planning if the patient needs post-hospital services based on physician/advanced practitioner order or complex needs related to functional status, cognitive ability, or social support system  Outcome: Progressing     Problem: METABOLIC, FLUID AND ELECTROLYTES - ADULT  Goal: Electrolytes maintained within normal limits  Description  INTERVENTIONS:  - Monitor labs and assess patient for signs and symptoms of electrolyte imbalances  - Administer electrolyte replacement as ordered  - Monitor response to electrolyte replacements, including repeat lab results as appropriate  - Instruct patient on fluid and nutrition as appropriate  Outcome: Progressing  Goal: Fluid balance maintained  Description  INTERVENTIONS:  - Monitor labs   - Monitor I/O and WT  - Instruct patient on fluid and nutrition as appropriate  Outcome: Progressing

## 2019-10-18 LAB
ATRIAL RATE: 74 BPM
P AXIS: 52 DEGREES
PR INTERVAL: 168 MS
QRS AXIS: -6 DEGREES
QRSD INTERVAL: 96 MS
QT INTERVAL: 432 MS
QTC INTERVAL: 479 MS
T WAVE AXIS: 28 DEGREES
VENTRICULAR RATE: 74 BPM

## 2019-10-18 PROCEDURE — 93010 ELECTROCARDIOGRAM REPORT: CPT | Performed by: INTERNAL MEDICINE

## 2019-10-25 ENCOUNTER — TELEPHONE (OUTPATIENT)
Dept: NEUROLOGY | Facility: CLINIC | Age: 49
End: 2019-10-25

## 2019-10-25 NOTE — TELEPHONE ENCOUNTER
10/21/19 FAXED [address] INS REF REQ TO PCP  10/21/19 LMOM FOR PT TO CALL PCP TO ESTABLISH IN ORDER TO REQ INS REF AND TO CALL ME BACK W/PCP NAME- DR JIN'S OFFICE STATES THEY ARE NOT HIS PCP  DR Olayinka Jefferson OFFICE LISTED ON CARD AND IN [place] AS PCP STATES PT HAS NEVER BEEN SEEN THERE

## 2019-12-02 DIAGNOSIS — G43.719 INTRACTABLE CHRONIC MIGRAINE WITHOUT AURA AND WITHOUT STATUS MIGRAINOSUS: ICD-10-CM

## 2019-12-02 DIAGNOSIS — F41.1 GAD (GENERALIZED ANXIETY DISORDER): ICD-10-CM

## 2019-12-02 DIAGNOSIS — M54.81 OCCIPITAL NEURALGIA OF LEFT SIDE: ICD-10-CM

## 2019-12-02 RX ORDER — ZONISAMIDE 50 MG/1
CAPSULE ORAL
Qty: 120 CAPSULE | Refills: 2 | Status: SHIPPED | OUTPATIENT
Start: 2019-12-02 | End: 2020-01-14

## 2019-12-10 DIAGNOSIS — G43.719 INTRACTABLE CHRONIC MIGRAINE WITHOUT AURA AND WITHOUT STATUS MIGRAINOSUS: ICD-10-CM

## 2019-12-10 DIAGNOSIS — M54.81 OCCIPITAL NEURALGIA OF LEFT SIDE: ICD-10-CM

## 2019-12-10 RX ORDER — ZONISAMIDE 50 MG/1
CAPSULE ORAL
Qty: 120 CAPSULE | Refills: 2 | OUTPATIENT
Start: 2019-12-10

## 2019-12-10 NOTE — TELEPHONE ENCOUNTER
I have never seen him before and the medication he is requesting is not a psych medication but a medication for migraines

## 2019-12-10 NOTE — TELEPHONE ENCOUNTER
Patient called in, looking for refill, Has been seeing Dr Caleb Nieves  I offered Patient and appt, but he  is not able to do 1 hour Session due to the time frames available in the schedule  Pt is unable to drive himself due to Neuroglial problems, and medications   Please advise

## 2019-12-26 DIAGNOSIS — F41.1 GAD (GENERALIZED ANXIETY DISORDER): ICD-10-CM

## 2019-12-26 RX ORDER — CLONAZEPAM 0.5 MG/1
TABLET ORAL
Qty: 60 TABLET | Refills: 0 | Status: SHIPPED | OUTPATIENT
Start: 2019-12-26

## 2020-01-14 DIAGNOSIS — G43.719 INTRACTABLE CHRONIC MIGRAINE WITHOUT AURA AND WITHOUT STATUS MIGRAINOSUS: ICD-10-CM

## 2020-01-14 DIAGNOSIS — M54.81 OCCIPITAL NEURALGIA OF LEFT SIDE: ICD-10-CM

## 2020-01-14 RX ORDER — ZONISAMIDE 50 MG/1
CAPSULE ORAL
Qty: 120 CAPSULE | Refills: 2 | Status: SHIPPED | OUTPATIENT
Start: 2020-01-14 | End: 2020-02-10

## 2020-01-24 ENCOUNTER — TELEPHONE (OUTPATIENT)
Dept: NEUROLOGY | Facility: CLINIC | Age: 50
End: 2020-01-24

## 2020-01-24 NOTE — TELEPHONE ENCOUNTER
representing patient called and stated that he sent over a records request in the beginning of December and he hasn't received anything  The court date is on 1/30   He confirmed central fax # and will re-fax request

## 2020-01-27 ENCOUNTER — TELEPHONE (OUTPATIENT)
Dept: NEUROLOGY | Facility: CLINIC | Age: 50
End: 2020-01-27

## 2020-01-27 NOTE — TELEPHONE ENCOUNTER
Payal Lazo called asking for medical records for this patient  He claims to have sent the request to central fax on Friday  However, I do not see anything scanned in  He apparently requested these records in December  I called and left a VM for Mata Bojorquez, [facility] Coordinator to please return my call

## 2020-01-28 NOTE — TELEPHONE ENCOUNTER
All records faxed to Sulema Poe today at 1:59 pm to [phone] as per Oral Rahul B  there would not be enough time for [facility] to process  Failed fax attempt--resent to another fax #[phone]

## 2020-02-05 DIAGNOSIS — G43.719 INTRACTABLE CHRONIC MIGRAINE WITHOUT AURA AND WITHOUT STATUS MIGRAINOSUS: ICD-10-CM

## 2020-02-05 DIAGNOSIS — M54.81 OCCIPITAL NEURALGIA OF LEFT SIDE: ICD-10-CM

## 2020-02-10 RX ORDER — ZONISAMIDE 50 MG/1
CAPSULE ORAL
Qty: 120 CAPSULE | Refills: 5 | Status: SHIPPED | OUTPATIENT
Start: 2020-02-10

## 2020-06-05 ENCOUNTER — TELEPHONE (OUTPATIENT)
Dept: NEUROLOGY | Facility: CLINIC | Age: 50
End: 2020-06-05

## 2020-06-05 DIAGNOSIS — G43.719 INTRACTABLE CHRONIC MIGRAINE WITHOUT AURA AND WITHOUT STATUS MIGRAINOSUS: Primary | ICD-10-CM

## 2020-06-05 RX ORDER — ONDANSETRON 4 MG/1
4 TABLET, FILM COATED ORAL EVERY 8 HOURS PRN
Qty: 20 TABLET | Refills: 0 | Status: SHIPPED | OUTPATIENT
Start: 2020-06-05

## 2020-06-05 RX ORDER — KETOROLAC TROMETHAMINE 10 MG/1
10 TABLET, FILM COATED ORAL EVERY 6 HOURS PRN
Qty: 10 TABLET | Refills: 0 | Status: SHIPPED | OUTPATIENT
Start: 2020-06-05

## 2020-06-25 ENCOUNTER — TELEPHONE (OUTPATIENT)
Dept: NEUROLOGY | Facility: CLINIC | Age: 50
End: 2020-06-25

## 2020-11-03 RX ORDER — CLONAZEPAM 0.5 MG/1
TABLET ORAL
Qty: 60 TABLET | Refills: 2 | OUTPATIENT
Start: 2020-11-03

## 2021-12-06 ENCOUNTER — HOSPITAL ENCOUNTER (EMERGENCY)
Facility: HOSPITAL | Age: 51
Discharge: HOME | End: 2021-12-06
Attending: EMERGENCY MEDICINE | Admitting: EMERGENCY MEDICINE
Payer: COMMERCIAL

## 2021-12-06 ENCOUNTER — APPOINTMENT (EMERGENCY)
Dept: RADIOLOGY | Facility: HOSPITAL | Age: 51
End: 2021-12-06
Payer: COMMERCIAL

## 2021-12-06 VITALS
TEMPERATURE: 98.7 F | SYSTOLIC BLOOD PRESSURE: 108 MMHG | WEIGHT: 176 LBS | HEART RATE: 79 BPM | DIASTOLIC BLOOD PRESSURE: 75 MMHG | OXYGEN SATURATION: 94 % | BODY MASS INDEX: 23.84 KG/M2 | HEIGHT: 72 IN | RESPIRATION RATE: 18 BRPM

## 2021-12-06 DIAGNOSIS — E86.0 DEHYDRATION: Primary | ICD-10-CM

## 2021-12-06 LAB
ALBUMIN SERPL BCP-MCNC: 3.7 G/DL (ref 3.5–5)
ALP SERPL-CCNC: 64 U/L (ref 46–116)
ALT SERPL W P-5'-P-CCNC: 22 U/L (ref 12–78)
ANION GAP SERPL CALCULATED.3IONS-SCNC: 9 MMOL/L (ref 4–13)
APTT PPP: 30 SECONDS (ref 23–37)
AST SERPL W P-5'-P-CCNC: 33 U/L (ref 5–45)
BASOPHILS # BLD AUTO: 0 THOUSANDS/ΜL (ref 0–0.1)
BASOPHILS NFR BLD AUTO: 0 % (ref 0–1)
BILIRUB SERPL-MCNC: 1.01 MG/DL (ref 0.2–1)
BUN SERPL-MCNC: 20 MG/DL (ref 5–25)
CALCIUM SERPL-MCNC: 8.4 MG/DL (ref 8.3–10.1)
CHLORIDE SERPL-SCNC: 97 MMOL/L (ref 100–108)
CO2 SERPL-SCNC: 29 MMOL/L (ref 21–32)
CREAT SERPL-MCNC: 1.01 MG/DL (ref 0.6–1.3)
CRP SERPL QL: 16.4 MG/L
D DIMER PPP FEU-MCNC: 0.68 UG/ML FEU
EOSINOPHIL # BLD AUTO: 0 THOUSAND/ΜL (ref 0–0.61)
EOSINOPHIL NFR BLD AUTO: 0 % (ref 0–6)
ERYTHROCYTE [DISTWIDTH] IN BLOOD BY AUTOMATED COUNT: 15.2 % (ref 11.6–15.1)
ERYTHROCYTE [SEDIMENTATION RATE] IN BLOOD: 24 MM/HOUR (ref 0–19)
FLUAV RNA RESP QL NAA+PROBE: NEGATIVE
FLUBV RNA RESP QL NAA+PROBE: NEGATIVE
GFR SERPL CREATININE-BSD FRML MDRD: 85 ML/MIN/1.73SQ M
GLUCOSE SERPL-MCNC: 112 MG/DL (ref 65–140)
HCT VFR BLD AUTO: 42.8 % (ref 36.5–49.3)
HGB BLD-MCNC: 13.7 G/DL (ref 12–17)
IMM GRANULOCYTES # BLD AUTO: 0.01 THOUSAND/UL (ref 0–0.2)
IMM GRANULOCYTES NFR BLD AUTO: 1 % (ref 0–2)
INR PPP: 1.08 (ref 0.84–1.19)
LACTATE SERPL-SCNC: 1.3 MMOL/L (ref 0.5–2)
LYMPHOCYTES # BLD AUTO: 0.87 THOUSANDS/ΜL (ref 0.6–4.47)
LYMPHOCYTES NFR BLD AUTO: 46 % (ref 14–44)
MCH RBC QN AUTO: 27.2 PG (ref 26.8–34.3)
MCHC RBC AUTO-ENTMCNC: 32 G/DL (ref 31.4–37.4)
MCV RBC AUTO: 85 FL (ref 82–98)
MONOCYTES # BLD AUTO: 0.27 THOUSAND/ΜL (ref 0.17–1.22)
MONOCYTES NFR BLD AUTO: 15 % (ref 4–12)
NEUTROPHILS # BLD AUTO: 0.7 THOUSANDS/ΜL (ref 1.85–7.62)
NEUTS SEG NFR BLD AUTO: 38 % (ref 43–75)
NRBC BLD AUTO-RTO: 0 /100 WBCS
PLATELET # BLD AUTO: 97 THOUSANDS/UL (ref 149–390)
PMV BLD AUTO: 11.8 FL (ref 8.9–12.7)
POTASSIUM SERPL-SCNC: 3.8 MMOL/L (ref 3.5–5.3)
PROT SERPL-MCNC: 7.8 G/DL (ref 6.4–8.2)
PROTHROMBIN TIME: 13.7 SECONDS (ref 11.6–14.5)
RBC # BLD AUTO: 5.03 MILLION/UL (ref 3.88–5.62)
RSV RNA RESP QL NAA+PROBE: NEGATIVE
SARS-COV-2 RNA RESP QL NAA+PROBE: POSITIVE
SODIUM SERPL-SCNC: 135 MMOL/L (ref 136–145)
WBC # BLD AUTO: 1.85 THOUSAND/UL (ref 4.31–10.16)

## 2021-12-06 PROCEDURE — 99284 EMERGENCY DEPT VISIT MOD MDM: CPT | Performed by: PHYSICIAN ASSISTANT

## 2021-12-06 PROCEDURE — 96374 THER/PROPH/DIAG INJ IV PUSH: CPT

## 2021-12-06 PROCEDURE — 36415 COLL VENOUS BLD VENIPUNCTURE: CPT | Performed by: EMERGENCY MEDICINE

## 2021-12-06 PROCEDURE — 87040 BLOOD CULTURE FOR BACTERIA: CPT | Performed by: PHYSICIAN ASSISTANT

## 2021-12-06 PROCEDURE — 96361 HYDRATE IV INFUSION ADD-ON: CPT

## 2021-12-06 PROCEDURE — 80053 COMPREHEN METABOLIC PANEL: CPT | Performed by: PHYSICIAN ASSISTANT

## 2021-12-06 PROCEDURE — 0241U HB NFCT DS VIR RESP RNA 4 TRGT: CPT | Performed by: PHYSICIAN ASSISTANT

## 2021-12-06 PROCEDURE — 85730 THROMBOPLASTIN TIME PARTIAL: CPT | Performed by: PHYSICIAN ASSISTANT

## 2021-12-06 PROCEDURE — 85025 COMPLETE CBC W/AUTO DIFF WBC: CPT | Performed by: PHYSICIAN ASSISTANT

## 2021-12-06 PROCEDURE — 85652 RBC SED RATE AUTOMATED: CPT | Performed by: PHYSICIAN ASSISTANT

## 2021-12-06 PROCEDURE — 71045 X-RAY EXAM CHEST 1 VIEW: CPT

## 2021-12-06 PROCEDURE — 85610 PROTHROMBIN TIME: CPT | Performed by: PHYSICIAN ASSISTANT

## 2021-12-06 PROCEDURE — 85379 FIBRIN DEGRADATION QUANT: CPT | Performed by: PHYSICIAN ASSISTANT

## 2021-12-06 PROCEDURE — 83605 ASSAY OF LACTIC ACID: CPT | Performed by: PHYSICIAN ASSISTANT

## 2021-12-06 PROCEDURE — 93005 ELECTROCARDIOGRAM TRACING: CPT

## 2021-12-06 PROCEDURE — 99284 EMERGENCY DEPT VISIT MOD MDM: CPT

## 2021-12-06 PROCEDURE — 86140 C-REACTIVE PROTEIN: CPT | Performed by: EMERGENCY MEDICINE

## 2021-12-06 RX ORDER — ONDANSETRON 4 MG/1
4 TABLET, ORALLY DISINTEGRATING ORAL EVERY 6 HOURS PRN
Qty: 10 TABLET | Refills: 0 | Status: SHIPPED | OUTPATIENT
Start: 2021-12-06

## 2021-12-06 RX ORDER — BENZONATATE 100 MG/1
100 CAPSULE ORAL EVERY 8 HOURS
Qty: 21 CAPSULE | Refills: 0 | Status: SHIPPED | OUTPATIENT
Start: 2021-12-06

## 2021-12-06 RX ORDER — ONDANSETRON 2 MG/ML
4 INJECTION INTRAMUSCULAR; INTRAVENOUS ONCE
Status: COMPLETED | OUTPATIENT
Start: 2021-12-06 | End: 2021-12-06

## 2021-12-06 RX ADMIN — SODIUM CHLORIDE 1000 ML: 0.9 INJECTION, SOLUTION INTRAVENOUS at 13:36

## 2021-12-06 RX ADMIN — ONDANSETRON 4 MG: 2 INJECTION INTRAMUSCULAR; INTRAVENOUS at 13:34

## 2021-12-09 LAB
ATRIAL RATE: 77 BPM
P AXIS: 48 DEGREES
PR INTERVAL: 172 MS
QRS AXIS: -17 DEGREES
QRSD INTERVAL: 90 MS
QT INTERVAL: 386 MS
QTC INTERVAL: 436 MS
T WAVE AXIS: 35 DEGREES
VENTRICULAR RATE: 77 BPM

## 2021-12-09 PROCEDURE — 93010 ELECTROCARDIOGRAM REPORT: CPT | Performed by: INTERNAL MEDICINE

## 2021-12-11 LAB
BACTERIA BLD CULT: NORMAL
BACTERIA BLD CULT: NORMAL

## 2022-12-29 ENCOUNTER — TELEPHONE (OUTPATIENT)
Dept: OBGYN CLINIC | Facility: CLINIC | Age: 52
End: 2022-12-29

## 2022-12-29 NOTE — TELEPHONE ENCOUNTER
JOCELYN at [facility] ([phone]) to see if the have an adjustor by the name of Benita Mcdaniel handling claim #[id number]  Need her phone number and fax number

## 2023-01-03 NOTE — TELEPHONE ENCOUNTER
LM at [phone] looking for the claim adjustor on his WC claim from 2011  Not sure if this is the correct number or not  Only info we have says Adair Russo [address], [address] [address], [place], [place]  This is a number that came up when I googled it

## 2023-01-06 ENCOUNTER — TELEPHONE (OUTPATIENT)
Dept: OBGYN CLINIC | Facility: CLINIC | Age: 53
End: 2023-01-06

## 2023-01-06 NOTE — TELEPHONE ENCOUNTER
LM asking if he was able to get the info we needed for his w/c claim from 2011  We need adjustor's name phone #, fax #, Ins  [employer] name phone # and address and state of injury   I also stated that he can go under his own insurance if he doesn't have this information

## 2023-01-23 ENCOUNTER — TELEPHONE (OUTPATIENT)
Dept: PAIN MEDICINE | Facility: CLINIC | Age: 53
End: 2023-01-23

## 2023-01-23 NOTE — TELEPHONE ENCOUNTER
Caller: patient     Doctor: Birgit Alonso    Reason for call: pt is trying to get updated information from his workmans comp claim & is still working on it       Call back#: [phone]

## 2023-01-23 NOTE — TELEPHONE ENCOUNTER
Caller: Nolan     Doctor: Dr Emily Lemon     Reason for call: The  called in the W/C information has been scanned into the to the PT's  chart from 10/15/2022   Please review   Consult 01/24/2023  Call back#: [phone]

## 2023-01-24 NOTE — TELEPHONE ENCOUNTER
I have attempted to reach the patient to come in early today as Dr Farida Larson had a cancellation  He may come anytme earlier than his appointment time today

## 2023-08-15 ENCOUNTER — APPOINTMENT (OUTPATIENT)
Dept: RADIOLOGY | Facility: CLINIC | Age: 53
End: 2023-08-15
Payer: OTHER MISCELLANEOUS

## 2023-08-15 ENCOUNTER — CONSULT (OUTPATIENT)
Dept: PAIN MEDICINE | Facility: CLINIC | Age: 53
End: 2023-08-15

## 2023-08-15 VITALS
TEMPERATURE: 98.2 F | WEIGHT: 185 LBS | BODY MASS INDEX: 25.09 KG/M2 | DIASTOLIC BLOOD PRESSURE: 78 MMHG | SYSTOLIC BLOOD PRESSURE: 154 MMHG | HEART RATE: 102 BPM

## 2023-08-15 DIAGNOSIS — M50.120 CERVICAL DISC DISORDER WITH RADICULOPATHY OF MID-CERVICAL REGION: ICD-10-CM

## 2023-08-15 DIAGNOSIS — M51.16 LUMBAR DISC DISEASE WITH RADICULOPATHY: ICD-10-CM

## 2023-08-15 DIAGNOSIS — G95.0 SYRINX (HCC): ICD-10-CM

## 2023-08-15 DIAGNOSIS — M54.2 NECK PAIN: ICD-10-CM

## 2023-08-15 DIAGNOSIS — M54.81 BILATERAL OCCIPITAL NEURALGIA: ICD-10-CM

## 2023-08-15 DIAGNOSIS — Z98.890 HISTORY OF CERVICAL SPINAL SURGERY: ICD-10-CM

## 2023-08-15 DIAGNOSIS — Z91.81 FALLS INFREQUENTLY: ICD-10-CM

## 2023-08-15 DIAGNOSIS — R29.898 LEFT LEG WEAKNESS: ICD-10-CM

## 2023-08-15 DIAGNOSIS — G89.4 CHRONIC PAIN SYNDROME: Primary | ICD-10-CM

## 2023-08-15 DIAGNOSIS — Z96.89 SPINAL CORD STIMULATOR STATUS: ICD-10-CM

## 2023-08-15 DIAGNOSIS — G43.719 INTRACTABLE CHRONIC MIGRAINE WITHOUT AURA AND WITHOUT STATUS MIGRAINOSUS: ICD-10-CM

## 2023-08-15 DIAGNOSIS — M54.50 LOW BACK PAIN, UNSPECIFIED BACK PAIN LATERALITY, UNSPECIFIED CHRONICITY, UNSPECIFIED WHETHER SCIATICA PRESENT: ICD-10-CM

## 2023-08-15 DIAGNOSIS — G89.4 CHRONIC PAIN SYNDROME: ICD-10-CM

## 2023-08-15 PROCEDURE — 72050 X-RAY EXAM NECK SPINE 4/5VWS: CPT

## 2023-08-15 PROCEDURE — 72114 X-RAY EXAM L-S SPINE BENDING: CPT

## 2023-08-15 PROCEDURE — 99204 OFFICE O/P NEW MOD 45 MIN: CPT | Performed by: PHYSICAL MEDICINE & REHABILITATION

## 2023-08-15 RX ORDER — GABAPENTIN 300 MG/1
300 CAPSULE ORAL 3 TIMES DAILY
Qty: 90 CAPSULE | Refills: 1 | Status: SHIPPED | OUTPATIENT
Start: 2023-08-15

## 2023-08-15 RX ORDER — GABAPENTIN 100 MG/1
100 CAPSULE ORAL 3 TIMES DAILY
COMMUNITY
End: 2023-08-15

## 2023-08-15 NOTE — PROGRESS NOTES
Assessment:  1. Chronic pain syndrome    2. Neck pain    3. Low back pain, unspecified back pain laterality, unspecified chronicity, unspecified whether sciatica present    4. Syrinx ([address])    5. Intractable chronic migraine without aura and without status migrainosus    6. Bilateral occipital neuralgia    7. Cervical disc disorder with radiculopathy of mid-cervical region    8. History of cervical spinal surgery    9. Spinal cord stimulator status    10. Lumbar disc disease with radiculopathy    11. Left leg weakness    12. Falls infrequently        Plan:  Orders Placed This Encounter   Procedures   • X-ray cervical spine complete 4 or 5 vw     Standing Status:   Future     Standing Expiration Date:   8/15/2027     Scheduling Instructions:      Bring along any outside films relating to this procedure. • XR spine lumbar complete w bending minimum 6 views     Standing Status:   Future     Standing Expiration Date:   8/15/2027     Scheduling Instructions:      Bring along any outside films relating to this procedure. • MRI cervical spine without contrast     Standing Status:   Future     Standing Expiration Date:   8/15/2027     Scheduling Instructions: There is no preparation for this test. Please leave your jewelry and valuables at home, wedding rings are the exception. All patients will be required to change into a hospital gown and pants. Street clothes are not permitted in the MRI. Magnetic nail polish must be removed prior to arrival for your test. Please bring your insurance cards, a form of photo ID and a list of your medications with you. Arrive 15 minutes prior to your appointment time in order to register. Please bring any prior CT or MRI studies of this area that were not performed at a [facility]. To schedule this appointment, please contact Central Scheduling at [phone].             Prior to your appointment, please make sure you complete the MRI Screening Form when you e-Check in for your appointment. This will be available starting 7 days before your appointment in [address] [place] [address]. You may receive an e-mail with an activation code if you do not have a [employer] account. If you do not have access to a device, we will complete your screening at your appointment. Order Specific Question:   What is the patient's sedation requirement? If Medication for Claustrophobia is selected, order medication at this point. Answer:   No Sedation     Order Specific Question:   Does this procedure require the 3T MRI at [facility] or [place]?     Answer:   No     Order Specific Question:   Release to patient through [employer]     Answer:   Immediate     Order Specific Question:   Is order priority selected as STAT? Answer:   No     Order Specific Question:   Reason for Exam (FREE TEXT)     Answer:   Cervical radiculopathy, syrinx, surgical hx   • MRI lumbar spine without contrast     Standing Status:   Future     Standing Expiration Date:   8/15/2027     Scheduling Instructions: There is no preparation for this test. Please leave your jewelry and valuables at home, wedding rings are the exception. All patients will be required to change into a hospital gown and pants. Street clothes are not permitted in the MRI. Magnetic nail polish must be removed prior to arrival for your test. Please bring your insurance cards, a form of photo ID and a list of your medications with you. Arrive 15 minutes prior to your appointment time in order to register. Please bring any prior CT or MRI studies of this area that were not performed at a [facility]. To schedule this appointment, please contact Central Scheduling at [phone]. Prior to your appointment, please make sure you complete the MRI Screening Form when you e-Check in for your appointment. This will be available starting 7 days before your appointment in [address] [place] [address].  You may receive an e-mail with an activation code if you do not have a [employer] account. If you do not have access to a device, we will complete your screening at your appointment. Order Specific Question:   What is the patient's sedation requirement? If Medication for Claustrophobia is selected, order medication at this point. Answer:   No Sedation     Order Specific Question:   Does this procedure require the 3T MRI at [facility] or [place]?     Answer:   No     Order Specific Question:   Release to patient through [employer]     Answer:   Immediate     Order Specific Question:   Is order priority selected as STAT? Answer:   No     Order Specific Question:   Reason for Exam (FREE TEXT)     Answer:   left leg weakness   • Ambulatory referral to Physical Therapy     Standing Status:   Future     Standing Expiration Date:   8/15/2024     Referral Priority:   Routine     Referral Type:   Physical Therapy     Referral Reason:   Specialty Services Required     Requested Specialty:   Physical Therapy     Number of Visits Requested:   1     Expiration Date:   8/15/2024       New Medications Ordered This Visit   Medications   • gabapentin (NEURONTIN) 300 mg capsule     Sig: Take 1 capsule (300 mg total) by mouth 3 (three) times a day     Dispense:  90 capsule     Refill:  1         Mr. Tessa Wills is a pleasant 77-year-old male significant and extensive past medical history of a work-related injury in February 2011 in which patient reports pulling a crowbar for his job and soon after experiencing a pop in his neck with subsequent worsening neck pain with radicular symptoms into the left arm as well as progressive low back pain with radicular symptoms into the left leg. Since the initial injury patient has undergone years of complications and surgeries including placement of a thoracolumbar spinal cord stimulator, cervical surgery, GI bleed associated with medication management and subsequent ulcers and gastric cancer.   I am limited on what documents have been provided and most recent imaging that I can see is from 2013 MRI of the lumbar and cervical spine with MRI cervical spine demonstrating multilevel cervical spondylosis and foraminal narrowing most notable at C4-C5, C5-C6 and C6/C7 as well as a cervical syrinx extending from C3-C7 and MRI lumbar spine demonstrating moderate to severe foraminal narrowing most notable at L4-L5 with severe foraminal narrowing to the left. There is also patient reported muscle atrophy and visible weakness in left upper extremity and left lower extremity as well as patient reported multiple falls. Overall, I am concerned with patient's current presentation with weakness in the left arm and left leg with a significant surgical history of the cervical spine and history of cervical syrinx. At this time further diagnostic work-up will be beneficial and warranted and we will order cervical and lumbar x-rays as well as cervical MRI and lumbar MRI without contrast to better identify disc and spine pathology contributing to symptoms. May need to consider surgical referral and considerations pending MRI work-up. We will also increase patient's gabapentin to a more therapeutic dose at 300 mg 3 times a day for neuropathic pain control. And pending imaging we will also provide patient with conservative measures with physical therapy. No interventional considerations at this time and we will follow-up after updated imaging studies. History of Present Illness:    Liv Cheema is a 46 y.o. male who presents to [facility] for initial evaluation of the above stated pain complaints. The patient has a past medical and chronic pain history as outlined in the assessment section. Patient is a self-referral regarding head, neck, left shoulder, low back and left lower extremity pain that patient dates back to a Worker's Comp. injury in February 2011 and reports being permanently disabled for the last 12 years.   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 Today patient reports moderate pain rated 7 out of 10 and interfering with activities. Pain is nearly constant 60 to 95% of the time that is present throughout the day and night. Describes symptoms as burning, shooting, numbness, pins-and-needles, pressure-like, throbbing, dull aching pain. Also reports lower extremity weakness but denies falls. Does not use any durable medical equipment for ambulation. Symptoms are worse with standing, bending, sitting, walking, exercise, relaxation, coughing. Reports no relief with previous surgery, nerve blocks, physical therapy, heat, TENS unit. Denies smoking, marijuana or alcohol use. Reports a previous history of Percocet, OxyContin, tramadol, Tylenol, Motrin with no relief. Also has a spinal cord stimulator which she is requesting to be removed. Presents today for initial evaluation. Review of Systems:    Review of Systems   Constitutional: Positive for chills and fever. Negative for fatigue. HENT: Negative for ear pain, mouth sores and sinus pressure. Eyes: Positive for pain. Negative for redness and visual disturbance. Respiratory: Negative for shortness of breath and wheezing. Cardiovascular: Positive for chest pain. Negative for palpitations. Gastrointestinal: Negative for abdominal pain and nausea. Endocrine: Negative for polyphagia. Musculoskeletal: Positive for back pain, gait problem, neck pain and neck stiffness. Negative for arthralgias. Joint pain  And pain in shoulder, arms, neck, head, legs, and back,   Skin: Negative for wound. Neurological: Positive for dizziness, weakness, light-headedness, numbness and headaches. Negative for seizures. Psychiatric/Behavioral: Positive for decreased concentration, dysphoric mood and sleep disturbance. The patient is nervous/anxious.             Past Medical History:   Diagnosis Date   • Acute pancreatitis without infection or necrosis 3/31/2018   • Anemia    • Balance problems    • Cancer of stomach (HCC)    • Hypertension    • Insomnia    • Occipital neuralgia    • Other mechanical complication of implanted electronic neurostimulator of spinal cord electrode (lead), sequela     maybe not working   • Syrinx of spinal cord (HCC)    • Vitamin D deficiency        Past Surgical History:   Procedure Laterality Date   • ABDOMINAL SURGERY      complete gastrectomy   • BACK SURGERY      cervical discectomy   • GASTRECTOMY     • INSERTION / PLACEMENT / REVISION NEUROSTIMULATOR      Neuro stimulator for iccipital neurolgia       Family History   Problem Relation Age of Onset   • Hypertension Mother    • Esophageal cancer Father    • No Known Problems Brother        Social History     Occupational History   • Not on file   Tobacco Use   • Smoking status: Former     Packs/day: 0.50     Years: 3.00     Total pack years: 1.50     Types: Cigarettes     Quit date: 1998     Years since quittin.6   • Smokeless tobacco: Never   • Tobacco comments:     Quit 30 yrs ago   Substance and Sexual Activity   • Alcohol use:  Yes     Alcohol/week: 3.0 standard drinks of alcohol     Types: 3 Shots of liquor per week   • Drug use: Never   • Sexual activity: Not Currently     Partners: Female         Current Outpatient Medications:   •  Ascorbic Acid (VITAMIN C) 1000 MG tablet, Take 2,000 mg by mouth daily, Disp: , Rfl:   •  Cyanocobalamin (VITAMIN B 12 PO), Take by mouth, Disp: , Rfl:   •  gabapentin (NEURONTIN) 300 mg capsule, Take 1 capsule (300 mg total) by mouth 3 (three) times a day, Disp: 90 capsule, Rfl: 1  •  benzonatate (TESSALON PERLES) 100 mg capsule, Take 1 capsule (100 mg total) by mouth every 8 (eight) hours (Patient not taking: Reported on 8/15/2023), Disp: 21 capsule, Rfl: 0  •  butalbital-aspirin-caffeine (FIORINAL) -40 mg per capsule, Take 1 capsule by mouth 3 (three) times a day as needed for headaches (Patient not taking: Reported on 8/15/2023), Disp: , Rfl:   •  clonazePAM (KlonoPIN) 0.5 mg tablet, 1 tablet twice daily as needed, wean off gradually during the last two weeks. No refills. (Patient not taking: Reported on 8/15/2023), Disp: 60 tablet, Rfl: 0  •  DULoxetine (CYMBALTA) 60 mg delayed release capsule, Take 1 capsule (60 mg total) by mouth daily Sprinkle contents of capsule onto apple sauce and take by mouth (Patient not taking: Reported on 10/16/2019), Disp: 30 capsule, Rfl: 2  •  FERROUS SULFATE PO, Take 220 mg by mouth daily (Patient not taking: Reported on 8/15/2023), Disp: , Rfl:   •  ketorolac (TORADOL) 10 mg tablet, Take 1 tablet (10 mg total) by mouth every 6 (six) hours as needed (migraine) Max 2-3 per week. (Patient not taking: Reported on 8/15/2023), Disp: 10 tablet, Rfl: 0  •  magnesium oxide (MAG-OX) 400 mg, Take 1 tablet (400 mg total) by mouth daily (Patient not taking: Reported on 8/15/2023), Disp: , Rfl: 0  •  mirtazapine (REMERON) 15 mg tablet, Take 0.5 tablets (7.5 mg total) by mouth daily at bedtime Can also take 1 tablet (15 mg) at bedtime if 7.5mg too sedating (Patient not taking: Reported on 8/15/2023), Disp: 15 tablet, Rfl: 1  •  ondansetron (Zofran ODT) 4 mg disintegrating tablet, Take 1 tablet (4 mg total) by mouth every 6 (six) hours as needed for nausea or vomiting (Patient not taking: Reported on 8/15/2023), Disp: 10 tablet, Rfl: 0  •  ondansetron (ZOFRAN) 4 mg tablet, Take 1 tablet (4 mg total) by mouth every 8 (eight) hours as needed for nausea or vomiting (Patient not taking: Reported on 8/15/2023), Disp: 20 tablet, Rfl: 0  •  SUMAtriptan (IMITREX) 100 mg tablet, One tab at migraine onset, repeat after 2 hr if needed. No more than 2/24 hours or 3 per week.  (Patient not taking: Reported on 8/15/2023), Disp: 9 tablet, Rfl: 0  •  zonisamide (ZONEGRAN) 50 MG capsule, 2  CAPSULES  TWICE  DAILY (Patient not taking: Reported on 8/15/2023), Disp: 120 capsule, Rfl: 5    Allergies   Allergen Reactions   • Carbamazepine Rash, Eye Swelling and Facial Swelling       Physical Exam:    /78   Pulse 102   Temp 98.2 °F (36.8 °C)   Wt 83.9 kg (185 lb)   BMI 25.09 kg/m²     Constitutional: normal, well developed, well nourished, alert, in no distress and non-toxic and no overt pain behavior.   Eyes: anicteric  HEENT: grossly intact  Neck: supple, symmetric, trachea midline and no masses   Pulmonary:even and unlabored  Cardiovascular:No edema or pitting edema present  Skin:Normal without rashes or lesions and well hydrated  Psychiatric:Mood and affect appropriate  Neurologic:Cranial Nerves II-XII grossly intact  Musculoskeletal:antalgic   Neck exam  Tenderness palpation left-sided cervical paraspinals, positive Tinel's at bilateral occiput with radiating pain along top of his head, decreased active and passive range of motion with cervical flexion and extension limited by pain left-sided, positive Spurling with radicular symptoms into the left arm, MMT 5 out of 5 right upper extremity and 4- out of 5 left upper extremity with elbow extension, wrist extension, finger abduction and  strength, sensation decreased to light touch in patchy distribution left upper extremity, DTRs within normal limits  Back exam  Tenderness to palpation bilateral lumbar paraspinals, decreased active and passive range of motion with lumbar flexion and extension limited by pain, MMT 5 out of 5 right lower extremity, 4- out of 5 left upper extremity and left hip flexion, knee extension and ankle dorsiflexion, sensation decreased to light touch in patchy distribution left lower extremity, positive straight leg raise in the supine position with radicular pain into the left leg, DTRs within normal limits    Imaging  X-ray cervical spine complete 4 or 5 vw    (Results Pending)   XR spine lumbar complete w bending minimum 6 views    (Results Pending)   MRI cervical spine without contrast    (Results Pending)   MRI lumbar spine without contrast (Results Pending)       Orders Placed This Encounter   Procedures   • X-ray cervical spine complete 4 or 5 vw   • XR spine lumbar complete w bending minimum 6 views   • MRI cervical spine without contrast   • MRI lumbar spine without contrast   • Ambulatory referral to Physical Therapy

## 2023-08-23 ENCOUNTER — TELEPHONE (OUTPATIENT)
Dept: PAIN MEDICINE | Facility: CLINIC | Age: 53
End: 2023-08-23

## 2023-08-23 NOTE — TELEPHONE ENCOUNTER
Caller: [facility]     Doctor: Reynaldo Bhakta    Reason for call: they need PT script for cervical spine  Patient is scheduled tomorrow in the morning     Call back#: [phone]  [phone]

## 2023-08-29 ENCOUNTER — TELEPHONE (OUTPATIENT)
Dept: PAIN MEDICINE | Facility: CLINIC | Age: 53
End: 2023-08-29

## 2023-08-29 NOTE — TELEPHONE ENCOUNTER
S/W pt and advised of his results. He statets his W/C is giving him a hard time to approve lumbar MRI.  He will complete both as soon as approved

## 2023-08-29 NOTE — TELEPHONE ENCOUNTER
Caller: [facility]    Doctor: hossein    Reason for call: they would like to know if the pt needs the mri before PT?     Call back#: [phone] ref #[id number]

## 2023-08-29 NOTE — TELEPHONE ENCOUNTER
Caller: Nolan     Doctor: Jessica Garcias     Reason for call: patient received bill from Consult on 8/15/23 please review referral all of W/C information is in referral     Please resubmit     Also please call him back with results of Xray     Thank you     Call back#: [phone]

## 2023-08-29 NOTE — TELEPHONE ENCOUNTER
L/m for patient stating that he would need to call billing, his wc info is updated in his chart.  Provided steve number [phone]

## 2023-08-29 NOTE — TELEPHONE ENCOUNTER
Dr Pepe Quezada -   Please review the xrays and advise    Clerical staff -   Not sure who handles the billing

## 2023-08-29 NOTE — TELEPHONE ENCOUNTER
Please notify patient cervical x-ray demonstrating Anterior cervical fusion extending from C5-C7 with intact hardware  Spinal cord stimulator with electrode reaching up to the level of the C1 and up to the upper aspect of C4   And lumbar x-ray demonstrating multilevel mild spondylitic degenerative changes most notable at L1-L2, L2-L3 and L3-L4  Was awaiting patient's MRI cervical and lumbar spine prior to discussing imaging and next steps

## 2023-08-30 NOTE — TELEPHONE ENCOUNTER
KW, pt has PT ordered and MRI of lumbar and cervical ordered. Pt is W/C.   W/C asking if pt to have MRI before PT, please advise, TY.

## 2023-08-30 NOTE — TELEPHONE ENCOUNTER
S/W [facility] staff. Advised as per Dr Kelleen Gowers.    They will update their record and contact the patient

## 2023-08-31 NOTE — TELEPHONE ENCOUNTER
Received letter (scanned into pt's chart) that Lumbar Spine is not related to his work injury. Pt and his  agreed back in July of 2014 the the Lumbar Spine was not part of the work injury. The Cervical Spine MRI and PT for the Cervical Spine have been authorized by W/CCam

## 2023-10-03 ENCOUNTER — TELEPHONE (OUTPATIENT)
Age: 53
End: 2023-10-03

## 2023-10-03 NOTE — TELEPHONE ENCOUNTER
Caller:Nolan Bueno     Doctor/Office:     Call regarding :  Billing     Call was transferred to: Billing

## 2023-10-11 ENCOUNTER — TELEPHONE (OUTPATIENT)
Age: 53
End: 2023-10-11

## 2023-10-11 NOTE — TELEPHONE ENCOUNTER
Caller: Magaly Babb    Doctor: Ada Travis    Reason for call: Rep called because she needs a order number provided for pt     Please advise    Call back#: [phone]

## 2023-10-11 NOTE — TELEPHONE ENCOUNTER
After several transfers I was told they needed the referral for the MRI of the Cervical Spine. The order # was provided verbally and the order was also faxed. Faxed confirmation has been scanned into the pt's chart.

## 2023-10-12 ENCOUNTER — TELEPHONE (OUTPATIENT)
Dept: PAIN MEDICINE | Facility: CLINIC | Age: 53
End: 2023-10-12

## 2023-10-12 NOTE — TELEPHONE ENCOUNTER
[facility] office received a fax regarding an MRI Cervical Spine order that they need,    Script for MRI cervical spine was faxed over to the number provided. Script and fax request scanned into his chart under media.

## 2023-11-06 ENCOUNTER — HOSPITAL ENCOUNTER (OUTPATIENT)
Dept: RADIOLOGY | Facility: HOSPITAL | Age: 53
Discharge: HOME | End: 2023-11-06

## 2023-11-06 DIAGNOSIS — Z96.89 SPINAL CORD STIMULATOR STATUS: ICD-10-CM

## 2023-11-24 ENCOUNTER — HOSPITAL ENCOUNTER (OUTPATIENT)
Dept: RADIOLOGY | Facility: HOSPITAL | Age: 53
Discharge: HOME | End: 2023-11-24
Payer: OTHER MISCELLANEOUS

## 2023-11-24 DIAGNOSIS — M54.50 LOW BACK PAIN, UNSPECIFIED BACK PAIN LATERALITY, UNSPECIFIED CHRONICITY, UNSPECIFIED WHETHER SCIATICA PRESENT: ICD-10-CM

## 2023-11-24 DIAGNOSIS — G89.4 CHRONIC PAIN SYNDROME: ICD-10-CM

## 2023-11-24 DIAGNOSIS — Z98.890 HISTORY OF CERVICAL SPINAL SURGERY: ICD-10-CM

## 2023-11-24 DIAGNOSIS — G43.719 INTRACTABLE CHRONIC MIGRAINE WITHOUT AURA AND WITHOUT STATUS MIGRAINOSUS: ICD-10-CM

## 2023-11-24 DIAGNOSIS — G95.0 SYRINX (HCC): ICD-10-CM

## 2023-11-24 DIAGNOSIS — M54.2 NECK PAIN: ICD-10-CM

## 2023-11-24 DIAGNOSIS — M54.81 BILATERAL OCCIPITAL NEURALGIA: ICD-10-CM

## 2023-11-24 DIAGNOSIS — M50.120 CERVICAL DISC DISORDER WITH RADICULOPATHY OF MID-CERVICAL REGION: ICD-10-CM

## 2023-11-24 PROCEDURE — G1004 CDSM NDSC: HCPCS

## 2023-11-24 PROCEDURE — 72141 MRI NECK SPINE W/O DYE: CPT

## 2023-11-29 ENCOUNTER — TELEPHONE (OUTPATIENT)
Dept: PAIN MEDICINE | Facility: CLINIC | Age: 53
End: 2023-11-29

## 2023-11-29 DIAGNOSIS — G95.0 SYRINX (HCC): ICD-10-CM

## 2023-11-29 DIAGNOSIS — M50.120 CERVICAL DISC DISORDER WITH RADICULOPATHY OF MID-CERVICAL REGION: ICD-10-CM

## 2023-11-29 DIAGNOSIS — M48.02 FORAMINAL STENOSIS OF CERVICAL REGION: Primary | ICD-10-CM

## 2023-11-29 NOTE — TELEPHONE ENCOUNTER
Caller: Nolan    Doctor: Cherelle Thurston    Reason for call: patient calling for MRI results and could you please send copy of MR I results to email on file.      Call back#: [phone]

## 2023-11-29 NOTE — TELEPHONE ENCOUNTER
Please notify patient of MRI cervical spine demonstrating disc protrusion with mass effect at C4-C5 as well as moderate foraminal narrowing at C3-C4 which may be contributing to the ongoing radicular pain into his left arm, however, given patient's clinical presentation at previous office visit and progressive weakness I will refer him to neurosurgery further evaluation and considerations  Please advise  Thank you

## 2023-12-04 NOTE — TELEPHONE ENCOUNTER
S/W pt and advised. Given the number to call NS regarding referral.  Pt also asked if he could stop by the office to  a copy of his MRI report as he does not have [employer] access.   Will stop in later today

## 2023-12-07 ENCOUNTER — TELEPHONE (OUTPATIENT)
Dept: NEUROSURGERY | Facility: CLINIC | Age: 53
End: 2023-12-07

## 2023-12-07 NOTE — TELEPHONE ENCOUNTER
Some missing information requested via        W/C CLAIM #    Date of Injury:  2/2/2011  Ins. Name:  Emmy Jon Lew  [facility]#    Billing Address:     Body Part:  Neck  Employer Name:  [place]:  [place]  Claim Status:

## 2024-01-17 ENCOUNTER — CONSULT (OUTPATIENT)
Dept: NEUROSURGERY | Facility: CLINIC | Age: 54
End: 2024-01-17
Payer: OTHER MISCELLANEOUS

## 2024-01-17 VITALS
WEIGHT: 177 LBS | DIASTOLIC BLOOD PRESSURE: 74 MMHG | RESPIRATION RATE: 16 BRPM | OXYGEN SATURATION: 99 % | HEART RATE: 78 BPM | SYSTOLIC BLOOD PRESSURE: 100 MMHG | BODY MASS INDEX: 23.98 KG/M2 | HEIGHT: 72 IN | TEMPERATURE: 98 F

## 2024-01-17 DIAGNOSIS — M50.120 CERVICAL DISC DISORDER WITH RADICULOPATHY OF MID-CERVICAL REGION: ICD-10-CM

## 2024-01-17 DIAGNOSIS — M48.02 FORAMINAL STENOSIS OF CERVICAL REGION: ICD-10-CM

## 2024-01-17 DIAGNOSIS — G95.0 SYRINX (HCC): ICD-10-CM

## 2024-01-17 PROCEDURE — 99244 OFF/OP CNSLTJ NEW/EST MOD 40: CPT | Performed by: PHYSICIAN ASSISTANT

## 2024-01-17 RX ORDER — METHOCARBAMOL 500 MG/1
500 TABLET, FILM COATED ORAL 3 TIMES DAILY
Qty: 45 TABLET | Refills: 0 | Status: SHIPPED | OUTPATIENT
Start: 2024-01-17

## 2024-01-17 NOTE — PROGRESS NOTES
Neurosurgery Office Note  Nolan Blue 53 y.o. male MRN: [id number]      Assessment/Plan     Patient is a 53 yr sold gentleman with Hx of neck injury at work  place in 2011 and had C4-7 ACDF and had also DCS placement for occipital Neuralgia at outside hospital in [place]. he  reported he had cervical spine syrinx at that time.  History of chronic left side weakness including his left upper and lower extremities. He is referred by pain Mx, Dr No, for evaluation of ongoing neck pain and LUE radiculopathy. Patient reports left UE radiculopathy, sometimes spasmodic in the left upper and left lower extremities, has left hand  weakness,fine motor dysfunction,  dropping objects, gait instability. But no B/B dysfunction. MRI of cervical spine on 11/24/2023 demonstrates postoperative changes and cervical spondylosis at C4-7, multifactorial disease resulting in moderate left foraminal narrowing at C3-4 and a central disc protrusion with mild mass effect on the thecal sac at C4-5, no reported syrinx.  He had cervical spine x-rays that doesn't include flex-ext views, but hardware appears all screws intact and in the pedicles.  Patient tried multiple pain medications including Cymbalta, clonazepam, gabapentin, and Toradol.     Exam-A&OX3. Moves all extremities, except weakness in the left UE ,  weakness 4/5,iOS 4-/5, elbow flexion/extension 4/5 , LLE DF/PF 4/5, KE 4/5 including Left quads, sensation to LT decreased in the left dorsal forearm and left  lateral arm, Brisk DTR in both upper and lower extremities, no clonus in both ankles. Tandem G instability.Tenderness in the lower cervical spine on palpation including shoulder blade region.    Hx, exam and images reviewed with the patient. Mx plan discussed. Patient's symptoms appear chronic, MRI of cervical spine shows some DJD, but non surgical to me as there is no critical central canal stenosis or significant NFN. I would recommend flexion-extension  cervical spine x-rays to evaluate any adjacent segment DJD. In the mean time, I would advise continue pain meds, and follow up with Dr No for possible MARICARMEN. In the mean time, continue follow up with pain Mx. All questions and concerns were answered to patient's satisfaction. Patient expressed his understandings and agreed with the plan.    Plan:   Flexion-Extension Cervical spine x-rays   Advised to bring his previous cervical spine MRI CD to review for Syrinx  Script for Robaxin sent to his pharmacy  Call with question or concern.    I have spent a total time of 45 minutes on 01/17/24 in caring for this patient including Diagnostic results, Prognosis, Risks and benefits of tx options, Instructions for management, Patient and family education, Importance of tx compliance, Risk factor reductions, Impressions, Counseling / Coordination of care, Documenting in the medical record, Reviewing / ordering tests, medicine, procedures  , and Obtaining or reviewing history  .          History of Present Illness       Patient is a 53 yrs old gentleman with PMHx of Gastric cancer s/p gastrectomy, HTN, Migraine headache, Occipital Neuralgia, HENRY, Alcohol intoxication, MDD and neck injury at work  place in 2011 and had C4-7 ACDF and  DCS placement for occipital Neuralgia at outside hospital in [place]. he  reported he had cervical spine syrinx at that time.  History of chronic left side weakness including his left upper and lower extremities. He is referred by pain Mx, Dr No, for evaluation of ongoing neck pain and LUE radiculopathy. Patient reports left UE radiculopathy, sometimes spasmodic in the left upper and left lower extremities, has left hand  weakness,fine motor dysfunction,  dropping objects, gait instability. But no B/B dysfunction.   But without significant help. He denies fever, chills, rigors, cough or chest pain. Patient tried multiple pain medications including Cymbalta, clonazepam, gabapentin, and  Toradol.    Patient denies Hx of DM, CHF, Stroke, seizures, bleeding disorder or taking anticoagulant meds. Denies Hx of smoking cigarettes. Hx of Alcohol ingestion    Neck Pain   Associated symptoms include headaches (occipital HA), numbness (base of neck radaitng down into left shoulder down into last 3 digits, also down left leg), trouble swallowing and weakness (left side).       REVIEW OF SYSTEMS    Review of Systems   HENT:  Positive for trouble swallowing.    Gastrointestinal:  Positive for nausea.        Removal of stomach   Musculoskeletal:  Positive for myalgias (cramping in left hand finger. foot) and neck pain.        Reports cervical syrinx   Neurological:  Positive for dizziness, weakness (left side), light-headedness, numbness (base of neck radaitng down into left shoulder down into last 3 digits, also down left leg) and headaches (occipital HA).        Medtronic Occipital neuralgia SCS implanted   Psychiatric/Behavioral:  Positive for sleep disturbance.        ROS obtained by MA. Reviewed. See HPI.     Meds/Allergies     Current Outpatient Medications   Medication Sig Dispense Refill    Ascorbic Acid (VITAMIN C) 1000 MG tablet Take 2,000 mg by mouth daily      benzonatate (TESSALON PERLES) 100 mg capsule Take 1 capsule (100 mg total) by mouth every 8 (eight) hours (Patient not taking: Reported on 8/15/2023) 21 capsule 0    butalbital-aspirin-caffeine (FIORINAL) -40 mg per capsule Take 1 capsule by mouth 3 (three) times a day as needed for headaches (Patient not taking: Reported on 8/15/2023)      clonazePAM (KlonoPIN) 0.5 mg tablet 1 tablet twice daily as needed, wean off gradually during the last two weeks. No refills. (Patient not taking: Reported on 8/15/2023) 60 tablet 0    Cyanocobalamin (VITAMIN B 12 PO) Take by mouth      DULoxetine (CYMBALTA) 60 mg delayed release capsule Take 1 capsule (60 mg total) by mouth daily Sprinkle contents of capsule onto apple sauce and take by mouth (Patient  not taking: Reported on 10/16/2019) 30 capsule 2    FERROUS SULFATE PO Take 220 mg by mouth daily (Patient not taking: Reported on 8/15/2023)      gabapentin (NEURONTIN) 300 mg capsule Take 1 capsule (300 mg total) by mouth 3 (three) times a day 90 capsule 1    ketorolac (TORADOL) 10 mg tablet Take 1 tablet (10 mg total) by mouth every 6 (six) hours as needed (migraine) Max 2-3 per week. (Patient not taking: Reported on 8/15/2023) 10 tablet 0    magnesium oxide (MAG-OX) 400 mg Take 1 tablet (400 mg total) by mouth daily (Patient not taking: Reported on 8/15/2023)  0    mirtazapine (REMERON) 15 mg tablet Take 0.5 tablets (7.5 mg total) by mouth daily at bedtime Can also take 1 tablet (15 mg) at bedtime if 7.5mg too sedating (Patient not taking: Reported on 8/15/2023) 15 tablet 1    ondansetron (Zofran ODT) 4 mg disintegrating tablet Take 1 tablet (4 mg total) by mouth every 6 (six) hours as needed for nausea or vomiting (Patient not taking: Reported on 8/15/2023) 10 tablet 0    ondansetron (ZOFRAN) 4 mg tablet Take 1 tablet (4 mg total) by mouth every 8 (eight) hours as needed for nausea or vomiting (Patient not taking: Reported on 8/15/2023) 20 tablet 0    SUMAtriptan (IMITREX) 100 mg tablet One tab at migraine onset, repeat after 2 hr if needed.  No more than 2/24 hours or 3 per week. (Patient not taking: Reported on 8/15/2023) 9 tablet 0    zonisamide (ZONEGRAN) 50 MG capsule 2  CAPSULES  TWICE  DAILY (Patient not taking: Reported on 8/15/2023) 120 capsule 5     No current facility-administered medications for this visit.       Allergies   Allergen Reactions    Carbamazepine Rash, Eye Swelling and Facial Swelling       PAST HISTORY    Past Medical History:   Diagnosis Date    Acute pancreatitis without infection or necrosis 3/31/2018    Anemia     Balance problems     Cancer of stomach (HCC)     Hypertension     Insomnia     Occipital neuralgia     Other mechanical complication of implanted electronic  neurostimulator of spinal cord electrode (lead), sequela     maybe not working    Syrinx of spinal cord (HCC)     Vitamin D deficiency        Past Surgical History:   Procedure Laterality Date    ABDOMINAL SURGERY      complete gastrectomy    BACK SURGERY      cervical discectomy    GASTRECTOMY      INSERTION / PLACEMENT / REVISION NEUROSTIMULATOR      Neuro stimulator for iccipital neurolgia       Social History     Tobacco Use    Smoking status: Former     Current packs/day: 0.00     Average packs/day: 0.5 packs/day for 3.0 years (1.5 ttl pk-yrs)     Types: Cigarettes     Start date: 1995     Quit date: 1998     Years since quittin.0    Smokeless tobacco: Never    Tobacco comments:     Quit 30 yrs ago   Substance Use Topics    Alcohol use: Yes     Alcohol/week: 3.0 standard drinks of alcohol     Types: 3 Shots of liquor per week    Drug use: Never       Family History   Problem Relation Age of Onset    Hypertension Mother     Esophageal cancer Father     No Known Problems Brother          Above history personally reviewed.       EXAM    Vitals:There were no vitals taken for this visit.,There is no height or weight on file to calculate BMI.     Physical Exam  Constitutional:       Appearance: Normal appearance.   HENT:      Head: Normocephalic and atraumatic.   Pulmonary:      Effort: Pulmonary effort is normal.   Musculoskeletal:         General: Tenderness present.      Cervical back: Tenderness present.   Neurological:      Mental Status: He is alert and oriented to person, place, and time.      Sensory: Sensory deficit present.      Motor: Weakness present.      Gait: Gait abnormal.      Deep Tendon Reflexes: Reflexes abnormal.      Reflex Scores:       Tricep reflexes are 3+ on the right side and 2+ on the left side.       Bicep reflexes are 3+ on the right side and 2+ on the left side.       Brachioradialis reflexes are 3+ on the right side and 2+ on the left side.       Patellar reflexes are 3+  on the right side and 2+ on the left side.       Achilles reflexes are 3+ on the right side and 2+ on the left side.  Psychiatric:         Speech: Speech normal.         Neurologic Exam     Mental Status   Oriented to person, place, and time.   Speech: speech is normal   Level of consciousness: alert    Cranial Nerves     CN XI   CN XI normal.     Motor Exam   Muscle bulk: normal  Overall muscle tone: normal  Right arm tone: normal  Left arm tone: normal  Right arm pronator drift: absent  Left arm pronator drift: absent  Right leg tone: normal  Left leg tone: normal    Sensory Exam   Right arm light touch: normal  Left arm light touch: decreased from elbow  Right leg light touch: normal  Left leg light touch: decreased from knee    Gait, Coordination, and Reflexes     Reflexes   Right brachioradialis: 3+  Left brachioradialis: 2+  Right biceps: 3+  Left biceps: 2+  Right triceps: 3+  Left triceps: 2+  Right patellar: 3+  Left patellar: 2+  Right achilles: 3+  Left achilles: 2+  Right : 2+  Left : 1+  Right Christianson: absent  Left Christianson: absent  Right ankle clonus: absent  Left ankle clonus: absent        MEDICAL DECISION MAKING    Imaging Studies:     No results found.    I have personally reviewed pertinent reports.   and I have personally reviewed pertinent films in PACS

## 2024-02-01 ENCOUNTER — TELEPHONE (OUTPATIENT)
Dept: NEUROSURGERY | Facility: CLINIC | Age: 54
End: 2024-02-01

## 2024-03-08 ENCOUNTER — TELEPHONE (OUTPATIENT)
Dept: NEUROSURGERY | Facility: CLINIC | Age: 54
End: 2024-03-08

## 2025-01-16 NOTE — PLAN OF CARE
Contact Numbers  [facility]: [phone] (for symptom and scheduling needs)    Please call the [facility] Triage line if you experience a temperature greater than or equal to 100.4, shaking chills, have uncontrolled nausea, vomiting and/or diarrhea, dizziness, shortness of breath, chest pain, bleeding, unexplained bruising, or if you have any other new/concerning symptoms, questions or concerns.     If you are having any concerning symptoms or wish to speak to a provider before your next infusion visit, please call your care triage to notify them so we can adequately serve you.     If you need a refill on a narcotic prescription or other medication, please call triage before your infusion appointment.      Problem: DISCHARGE PLANNING - CARE MANAGEMENT  Goal: Discharge to post-acute care or home with appropriate resources  INTERVENTIONS:  - Conduct assessment to determine patient/family and health care team treatment goals, and need for post-acute services based on payer coverage, community resources, and patient preferences, and barriers to discharge  - Address psychosocial, clinical, and financial barriers to discharge as identified in assessment in conjunction with the patient/family and health care team  - Arrange appropriate level of post-acute services according to patient's   needs and preference and payer coverage in collaboration with the physician and health care team  - Communicate with and update the patient/family, physician, and health care team regarding progress on the discharge plan  - Arrange appropriate transportation to post-acute venues  RETURN TO HOME INDEPENDENTLY  Outcome: Progressing

## 2025-01-29 ENCOUNTER — TELEPHONE (OUTPATIENT)
Dept: OBGYN CLINIC | Facility: CLINIC | Age: 55
End: 2025-01-29

## 2025-01-29 NOTE — TELEPHONE ENCOUNTER
Patient stopped by stating he couldn't get ahold of his adjustor on his WC claim Angie as they weren't paying anything. After several attempts I was able to get rolando of her and got a new fax # to send info (which I did).

## 2025-04-17 NOTE — ASSESSMENT & PLAN NOTE
Chronic in nature for patient but has worsened over the last 3 months  Patient has a neurostimulator in place  Likely also secondary to intractable migraine  Advised patient to follow up with neurologist and pain management doctor after discharge  Patient was started on verapamil  milligram p   O  daily and with some oxide 20 milligram p   O  daily as verapamil can cause constipation    Plan is to start patient on carbamazepine 100 mg b i d  1 week later Your recent uric acid level is 6.7, which is above the target goal of less than 6.0. To better manage this, I recommend increasing your allopurinol dose to 300 mg daily.     We will recheck your uric acid level at your next visit to monitor response to the dose adjustment.

## 2025-05-05 ENCOUNTER — TELEPHONE (OUTPATIENT)
Dept: NEUROSURGERY | Facility: CLINIC | Age: 55
End: 2025-05-05

## 2025-05-05 NOTE — TELEPHONE ENCOUNTER
Called and LVM for Scarlett at [employer] comp [phone] to confirm if claim is still open and billable.

## 2025-05-05 NOTE — TELEPHONE ENCOUNTER
Patient and wife stopped into office requesting appt to talk about having SCS removed. The SCS has been off for 4 years and is worried battery may eventually will leak also battery pack is now loose and bothering him since patient has lost about 60 pounds. and Patient states this is to be billed through workman's comp. Patient is having tingling that radiates to head and finger tips and is having dizziness. Patient was suppose to follow after seeing Cam in Jan 2024 with xrays. Scheduled appt with Cam for work up.

## 2025-06-27 ENCOUNTER — OFFICE VISIT (OUTPATIENT)
Dept: NEUROSURGERY | Facility: CLINIC | Age: 55
End: 2025-06-27
Payer: OTHER MISCELLANEOUS

## 2025-06-27 VITALS
HEART RATE: 68 BPM | SYSTOLIC BLOOD PRESSURE: 124 MMHG | TEMPERATURE: 98.4 F | BODY MASS INDEX: 23.84 KG/M2 | WEIGHT: 176 LBS | OXYGEN SATURATION: 99 % | RESPIRATION RATE: 17 BRPM | DIASTOLIC BLOOD PRESSURE: 76 MMHG | HEIGHT: 72 IN

## 2025-06-27 DIAGNOSIS — M54.12 RADICULOPATHY, CERVICAL: Primary | ICD-10-CM

## 2025-06-27 DIAGNOSIS — Z98.1 S/P CERVICAL SPINAL FUSION: ICD-10-CM

## 2025-06-27 DIAGNOSIS — R11.2 NAUSEA & VOMITING: ICD-10-CM

## 2025-06-27 PROCEDURE — 99214 OFFICE O/P EST MOD 30 MIN: CPT | Performed by: PHYSICIAN ASSISTANT

## 2025-06-27 RX ORDER — ONDANSETRON 4 MG/1
4 TABLET, FILM COATED ORAL EVERY 8 HOURS PRN
Qty: 20 TABLET | Refills: 0 | Status: SHIPPED | OUTPATIENT
Start: 2025-06-27

## 2025-06-27 NOTE — PROGRESS NOTES
Name: Nolan Blue      : 1970      MRN: [id number]  Encounter Provider: Cam Mcnally PA-C  Encounter Date: 2025   Encounter department: [facility] [place]  :  Assessment & Plan  Radiculopathy, cervical  Patient is a 55 yrs old with PMHx of HTN, Gastric cancer, s/p Gastrectomy, HENRY, MDD, and  cervical spine syrinx, s/p C4-7 ACDF and had also DCS placement for occipital Neuralgia at [facility] in [place] in  . Hx of neck injury at work place.Patient had also s/p SCS placement for occipital neuralgia.    Patient is known to NSX service for his chronic neck pain with LUE>RUE radicular symptoms. He was following up with pain Mx. Patient reports pain is severe, has difficulty going up stairs, he has ongoing left arm spasm, sometimes associated with left LLE radicular symptoms . Left arm weakness, dropping objects, gait instability. He had also chronic left foot weakness/drop, gait issues, frequent falls. Patient denies Hx of B/B dysfunction or saddle anaesthesia.     Patient has also lower back pain, but today he is here for his neck. He also wants to talk to neurosurgeon to discuss about SCS removal, as the stimulator is not helping with his pain.     Patient tried limited pain meds because of side effect and intolerance in the setting of gastrectomy.    Patient denies Hx of DM, Stroke, seizures, bleeding disorder or taking anticoagulant meds. Denies smoking cigarettes.     Imagings:     MRI of Cervical spine wo contrast demonstrates Postoperative changes and cervical spondylosis as described above.  Multifactorial disease results in moderate left foraminal narrowing at C3-C4.Central disc protrusion with mild mass effect on the thecal sac is present at C4-C5.    Plan:    I reviewed previous images with the patient and his wife. Mx plan discussed. Patient doesn't have uptodate MRI of cervical spine to evaluate progression of cervical spine stenosis/NFN and also  cervical spine syrinx.    Regarding SCS removal, I think patient may need to see the Neurosurgeon. Will check with .    I would also recommend Flexion-Extension Cervical spine xrays.    Zanaflex for his neck pain  and Zofran for Nausea and Vomiting until he sees his PCP.    F/U after images completion. All question and concerns were answered to patient's satisfaction. Both patient and his wife expressed their understandings and agreed with the plan.        Orders:    MRI cervical spine with and without contrast; Future    BUN/Creatinine Ratio; Future    tiZANidine (ZANAFLEX) 4 mg tablet; Take 1 tablet (4 mg total) by mouth daily at bedtime        History of Present Illness     Nolan Blue is a 55 y.o. male here today for progressive neck pain with UE radicular/spasmodic symptoms evaluation    HPI   See discussion above  Review of Systems   HENT:  Positive for trouble swallowing.    Genitourinary:  Negative for enuresis.   Musculoskeletal:  Positive for gait problem (off balance), myalgias (in neck), neck pain (neck pain into left shoulder and arm) and neck stiffness (lower rom).   Neurological:  Positive for dizziness, weakness (LUE), light-headedness and numbness (BUE).        Left drop foot    Hematological:  Does not bruise/bleed easily.   All other systems reviewed and are negative.    I have personally reviewed the MA's review of systems and made changes as necessary.    Past Medical History   Past Medical History[1]  Past Surgical History[2]  Family History[3]  he reports that he quit smoking about 27 years ago. His smoking use included cigarettes. He started smoking about 30 years ago. He has a 1.5 pack-year smoking history. He has never used smokeless tobacco. He reports current alcohol use of about 3.0 standard drinks of alcohol per week. He reports that he does not use drugs.  Current Outpatient Medications   Medication Instructions    Cyanocobalamin (VITAMIN B 12 PO) Take by mouth     FERROUS SULFATE  mg, Daily    methocarbamol (ROBAXIN) 500 mg, Oral, 3 times daily    vitamin C 2,000 mg, Daily   Allergies[4]   Objective   Resp 17   Ht 6' (1.829 m)   Wt 79.8 kg (176 lb)   BMI 23.87 kg/m²     Physical Exam  Constitutional:       Appearance: Normal appearance.   HENT:      Head: Normocephalic and atraumatic.   Pulmonary:      Effort: Pulmonary effort is normal.     Musculoskeletal:         General: Normal range of motion.      Cervical back: Rigidity and tenderness present.     Neurological:      Mental Status: He is oriented to person, place, and time.      Sensory: Sensory deficit present.      Motor: Weakness present.      Gait: Gait abnormal.      Deep Tendon Reflexes:      Reflex Scores:       Tricep reflexes are 2+ on the right side and 2+ on the left side.       Bicep reflexes are 2+ on the right side and 2+ on the left side.       Brachioradialis reflexes are 2+ on the right side and 2+ on the left side.       Patellar reflexes are 2+ on the right side and 2+ on the left side.       Achilles reflexes are 2+ on the right side and 2+ on the left side.    Psychiatric:         Speech: Speech normal.       Neurological Exam  Mental Status  Awake, alert and oriented to person, place and time. Oriented to person, place and time. Oriented to person, place, and time. Speech is normal. Language is fluent with no aphasia.    Motor  Normal muscle bulk throughout. No fasciculations present. Normal muscle tone. No abnormal involuntary movements. Strength is 5/5 in all four extremities except as noted.  Left Hand  weakness 4/5 , including elbow flex/ext.    Sensory  Light touch is normal in upper and lower extremities.     Reflexes                                            Right                      Left  Brachioradialis                    2+                         2+  Biceps                                 2+                         2+  Triceps                                2+                          2+  Patellar                                2+                         2+  Achilles                                2+                         2+    Right pathological reflexes: Selma's absent. Ankle clonus absent.  Left pathological reflexes: Selma's absent. Ankle clonus absent.    Gait   Abnormal gait.Casual gait:      Radiology Results Review: I personally reviewed the following image studies in PACS and associated radiology reports: MRI spine. My interpretation of the radiology images/reports is: see discussion above.       [1]   Past Medical History:  Diagnosis Date    Acute pancreatitis without infection or necrosis 3/31/2018    Anemia     Balance problems     Cancer of stomach (HCC)     Hypertension     Insomnia     Occipital neuralgia     Other mechanical complication of implanted electronic neurostimulator of spinal cord electrode (lead), sequela     maybe not working    Syrinx of spinal cord (HCC)     Vitamin D deficiency    [2]   Past Surgical History:  Procedure Laterality Date    ABDOMINAL SURGERY      complete gastrectomy    BACK SURGERY      cervical discectomy    GASTRECTOMY      INSERTION / PLACEMENT / REVISION NEUROSTIMULATOR      Neuro stimulator for iccipital neurolgia   [3]   Family History  Problem Relation Name Age of Onset    Hypertension Mother      Esophageal cancer Father      No Known Problems Brother     [4]   Allergies  Allergen Reactions    Carbamazepine Rash, Eye Swelling and Facial Swelling

## 2025-07-02 NOTE — QUICK NOTE
Investigation Report    Device investigated: Medtronic stimlator            Method investigated (surgical report, imaging report, vendor contacted, website used etc): Epic, Tech support, [employer]    Time spent investigating in minutes: 15 minutes    Investigation findings: MR Conditional    Risk vs Benefit performed.  If so, list physician and outcome: n/a

## 2025-07-03 ENCOUNTER — HOSPITAL ENCOUNTER (OUTPATIENT)
Dept: RADIOLOGY | Facility: HOSPITAL | Age: 55
Discharge: HOME | End: 2025-07-03
Payer: OTHER MISCELLANEOUS

## 2025-07-03 ENCOUNTER — APPOINTMENT (OUTPATIENT)
Dept: LAB | Facility: HOSPITAL | Age: 55
End: 2025-07-03
Attending: PHYSICIAN ASSISTANT
Payer: OTHER MISCELLANEOUS

## 2025-07-03 DIAGNOSIS — Z98.1 S/P CERVICAL SPINAL FUSION: ICD-10-CM

## 2025-07-03 DIAGNOSIS — M54.12 RADICULOPATHY, CERVICAL: ICD-10-CM

## 2025-07-03 DIAGNOSIS — Z96.89 SPINAL CORD STIMULATOR STATUS: ICD-10-CM

## 2025-07-03 LAB
BUN SERPL-MCNC: 16 MG/DL (ref 5–25)
CREAT SERPL-MCNC: 0.68 MG/DL (ref 0.6–1.3)
GFR SERPL CREATININE-BSD FRML MDRD: 106 ML/MIN/1.73SQ M

## 2025-07-03 PROCEDURE — 36415 COLL VENOUS BLD VENIPUNCTURE: CPT

## 2025-07-03 PROCEDURE — 72052 X-RAY EXAM NECK SPINE 6/>VWS: CPT

## 2025-07-03 PROCEDURE — 84520 ASSAY OF UREA NITROGEN: CPT

## 2025-07-03 PROCEDURE — 82565 ASSAY OF CREATININE: CPT

## 2025-07-15 ENCOUNTER — HOSPITAL ENCOUNTER (OUTPATIENT)
Dept: RADIOLOGY | Facility: HOSPITAL | Age: 55
Discharge: HOME | End: 2025-07-15
Payer: MEDICARE

## 2025-07-15 DIAGNOSIS — M54.12 RADICULOPATHY, CERVICAL: ICD-10-CM

## 2025-07-15 PROCEDURE — A9585 GADOBUTROL INJECTION: HCPCS | Performed by: PHYSICIAN ASSISTANT

## 2025-07-15 PROCEDURE — 72156 MRI NECK SPINE W/O & W/DYE: CPT

## 2025-07-15 RX ORDER — GADOBUTROL 604.72 MG/ML
8 INJECTION INTRAVENOUS
Status: COMPLETED | OUTPATIENT
Start: 2025-07-15 | End: 2025-07-15

## 2025-07-15 RX ADMIN — GADOBUTROL 8 ML: 604.72 INJECTION INTRAVENOUS at 14:10

## 2025-07-25 ENCOUNTER — OFFICE VISIT (OUTPATIENT)
Dept: NEUROSURGERY | Facility: CLINIC | Age: 55
End: 2025-07-25
Payer: OTHER MISCELLANEOUS

## 2025-07-25 VITALS
SYSTOLIC BLOOD PRESSURE: 116 MMHG | DIASTOLIC BLOOD PRESSURE: 82 MMHG | BODY MASS INDEX: 23.84 KG/M2 | HEART RATE: 59 BPM | OXYGEN SATURATION: 99 % | TEMPERATURE: 98.4 F | WEIGHT: 176 LBS | HEIGHT: 72 IN

## 2025-07-25 DIAGNOSIS — T85.840A: Primary | ICD-10-CM

## 2025-07-25 DIAGNOSIS — G95.0 SYRINX (HCC): ICD-10-CM

## 2025-07-25 PROCEDURE — 99213 OFFICE O/P EST LOW 20 MIN: CPT | Performed by: STUDENT IN AN ORGANIZED HEALTH CARE EDUCATION/TRAINING PROGRAM

## 2025-07-25 RX ORDER — CHLORHEXIDINE GLUCONATE ORAL RINSE 1.2 MG/ML
15 SOLUTION DENTAL ONCE
OUTPATIENT
Start: 2025-07-25 | End: 2025-07-25

## 2025-07-25 RX ORDER — CEFAZOLIN SODIUM 2 G/50ML
2000 SOLUTION INTRAVENOUS ONCE
OUTPATIENT
Start: 2025-07-25 | End: 2025-07-25

## 2025-07-25 RX ORDER — PANTOPRAZOLE SODIUM 40 MG/1
40 TABLET, DELAYED RELEASE ORAL AS NEEDED
COMMUNITY
Start: 2025-01-14 | End: 2026-01-14

## 2025-07-25 NOTE — PROGRESS NOTES
Name: Nolan Blue      : 1970      MRN: [id number]  Encounter Provider: Eran Wilson MD  Encounter Date: 2025   Encounter department: [facility] NEUROSURGICAL [facility]  :  Assessment & Plan  Pain due to nervous system device (HCC)  Nolan Blue is a 55 year old male with a history of cervical radicular pain and prior cervical SCS placement here to discuss SCS removal. He reports that initially his device was efficacious for his pain but that overtime the device was less beneficial. He has also lost a significant amount of weight and finds the battery site and the lead anchor site to be pain when they are touched or when he re-positions. I reviewed his most recent XR and fortunately he has percutaneous leads which make device removal far less risky. Given that the device is not effective for him and is uncomfortable I would recommend removal. We discussed risks including bleeding, infection, CSF leak, retained hardware precluded future MRIs. This will be an outpatient procedure performed under general anesthesia. After discussion of the risks he is agreeable to proceed. We will work to schedule a surgical date.   Orders:    Case request operating room: Removal of percutaenous cervical spinal cord stimulator and right sided bettery; Standing        History of Present Illness     HPI   Nolan Blue is a 55 year old male with a history of cervical radicular pain and prior cervical SCS placement here to discuss SCS removal.   Review of Systems   HENT:  Positive for trouble swallowing.    Gastrointestinal:  Positive for diarrhea (foam-huy, past 4-5 days; has been drinking more water).   Musculoskeletal:  Positive for gait problem (off balance), myalgias (in neck), neck pain (into BL arm L>R) and neck stiffness (lower rom).   Neurological:  Positive for dizziness, weakness (LUE), light-headedness and numbness (BUE).        Left drop foot    All other systems reviewed and are  negative.    I have personally reviewed the MA's review of systems and made changes as necessary.    Past Medical History   Past Medical History[1]  Past Surgical History[2]  Family History[3]  he reports that he quit smoking about 27 years ago. His smoking use included cigarettes. He started smoking about 30 years ago. He has a 1.5 pack-year smoking history. He has never used smokeless tobacco. He reports current alcohol use of about 3.0 standard drinks of alcohol per week. He reports that he does not use drugs.  Current Outpatient Medications   Medication Instructions    Cyanocobalamin (VITAMIN B 12 PO) Take by mouth    FERROUS SULFATE  mg, Oral, Daily    methocarbamol (ROBAXIN) 500 mg, Oral, 3 times daily    ondansetron (ZOFRAN) 4 mg, Oral, Every 8 hours PRN    pantoprazole (PROTONIX) 40 mg, Oral, As needed    tiZANidine (ZANAFLEX) 4 mg, Oral, Daily at bedtime    vitamin C 2,000 mg, Daily   Allergies[4]   Objective   /82 (BP Location: Right arm, Patient Position: Sitting, Cuff Size: Adult)   Pulse 59   Temp 98.4 °F (36.9 °C) (Temporal)   Ht 6' (1.829 m)   Wt 79.8 kg (176 lb)   SpO2 99%   BMI 23.87 kg/m²     Physical Exam  Neurological Exam  Awake and alert  Oriented and appropriate  Grossly 5/5 throughout  Prior wounds well healed                 [1]   Past Medical History:  Diagnosis Date    Acute pancreatitis without infection or necrosis 3/31/2018    Anemia     Balance problems     Cancer of stomach (HCC)     Hypertension     Insomnia     Occipital neuralgia     Other mechanical complication of implanted electronic neurostimulator of spinal cord electrode (lead), sequela     maybe not working    Syrinx of spinal cord (HCC)     Vitamin D deficiency    [2]   Past Surgical History:  Procedure Laterality Date    ABDOMINAL SURGERY      complete gastrectomy    BACK SURGERY      cervical discectomy    GASTRECTOMY      INSERTION / PLACEMENT / REVISION NEUROSTIMULATOR      Neuro stimulator for  iccipital neurolgia   [3]   Family History  Problem Relation Name Age of Onset    Hypertension Mother      Esophageal cancer Father      No Known Problems Brother     [4]   Allergies  Allergen Reactions    Carbamazepine Rash, Eye Swelling and Facial Swelling

## 2025-07-29 ENCOUNTER — TELEPHONE (OUTPATIENT)
Dept: NEUROSURGERY | Facility: CLINIC | Age: 55
End: 2025-07-29

## 2025-08-01 ENCOUNTER — OFFICE VISIT (OUTPATIENT)
Dept: NEUROSURGERY | Facility: CLINIC | Age: 55
End: 2025-08-01
Payer: MEDICARE

## 2025-08-01 VITALS
DIASTOLIC BLOOD PRESSURE: 76 MMHG | TEMPERATURE: 98 F | OXYGEN SATURATION: 99 % | WEIGHT: 184 LBS | HEART RATE: 66 BPM | HEIGHT: 72 IN | SYSTOLIC BLOOD PRESSURE: 118 MMHG | BODY MASS INDEX: 24.92 KG/M2

## 2025-08-01 DIAGNOSIS — F32.A DEPRESSION: ICD-10-CM

## 2025-08-01 DIAGNOSIS — M54.16 LUMBAR RADICULOPATHY: Primary | ICD-10-CM

## 2025-08-01 DIAGNOSIS — M54.2 NECK PAIN: ICD-10-CM

## 2025-08-01 DIAGNOSIS — G89.29 CHRONIC NECK PAIN WITH ABNORMAL NEUROLOGIC EXAMINATION: ICD-10-CM

## 2025-08-01 DIAGNOSIS — M54.2 CHRONIC NECK PAIN WITH ABNORMAL NEUROLOGIC EXAMINATION: ICD-10-CM

## 2025-08-01 PROCEDURE — 99213 OFFICE O/P EST LOW 20 MIN: CPT | Performed by: PHYSICIAN ASSISTANT

## 2025-08-22 ENCOUNTER — TELEPHONE (OUTPATIENT)
Dept: NEUROSURGERY | Facility: CLINIC | Age: 55
End: 2025-08-22